# Patient Record
Sex: FEMALE | Race: WHITE | Employment: OTHER | URBAN - METROPOLITAN AREA
[De-identification: names, ages, dates, MRNs, and addresses within clinical notes are randomized per-mention and may not be internally consistent; named-entity substitution may affect disease eponyms.]

---

## 2017-01-04 ENCOUNTER — PATIENT MESSAGE (OUTPATIENT)
Dept: ENDOCRINOLOGY | Age: 77
End: 2017-01-04

## 2017-01-12 ENCOUNTER — PATIENT MESSAGE (OUTPATIENT)
Dept: ENDOCRINOLOGY | Age: 77
End: 2017-01-12

## 2017-01-12 RX ORDER — ATORVASTATIN CALCIUM 80 MG/1
80 TABLET, FILM COATED ORAL
Qty: 90 TAB | Refills: 3 | Status: SHIPPED | OUTPATIENT
Start: 2017-01-12 | End: 2017-01-16 | Stop reason: SDUPTHER

## 2017-01-12 RX ORDER — FLUDROCORTISONE ACETATE 0.1 MG/1
0.1 TABLET ORAL DAILY
Qty: 90 TAB | Refills: 3 | Status: SHIPPED | OUTPATIENT
Start: 2017-01-12 | End: 2017-01-12 | Stop reason: SDUPTHER

## 2017-01-12 RX ORDER — GABAPENTIN 300 MG/1
300 CAPSULE ORAL
Qty: 90 CAP | Refills: 3 | Status: SHIPPED | OUTPATIENT
Start: 2017-01-12 | End: 2017-01-16 | Stop reason: SDUPTHER

## 2017-01-12 RX ORDER — FLUDROCORTISONE ACETATE 0.1 MG/1
0.1 TABLET ORAL DAILY
Qty: 90 TAB | Refills: 3 | Status: SHIPPED | OUTPATIENT
Start: 2017-01-12 | End: 2018-02-15 | Stop reason: SDUPTHER

## 2017-01-12 NOTE — TELEPHONE ENCOUNTER
From: Fernando Farris  To: Fabrizio Wong MD  Sent: 1/12/2017 9:05 AM EST  Subject: Prescription Question    I would like to use Dr. Mark Urena as my primary physician as well. I feel that my endocrine issues are such a large part of my health care needs that it would benefit me to receive my primary care from a doctor who understands my special circumstances. Is this possible? If so, I need refills called in for several of my medications: Atorvastatin, Fluoxetine, gabapentin, Meloxicam, Pantoprazole.   Pharmacy: [address] [place] [address] [phone]

## 2017-01-12 NOTE — TELEPHONE ENCOUNTER
From: Trudy Masterson MD  To: Valentín Frost  Sent: 1/4/2017 9:15 AM EST  Subject: fludrocortisone    Looks like you didn't receive my previous message, I have copied/pasted the message for you to view below:    Hi Mrs Mason Craig,   I sent your results though [address] Can you check and make sure you are not taking fludrocortisone? If you are, please let me know what dose. According to Dr Jennifer Don, you were taking this medication at 0.1mg daily in the past. Just wanted to clarify so I can make appropriate medication recommendations.    Thank you,   Ayush Staff

## 2017-01-16 ENCOUNTER — OFFICE VISIT (OUTPATIENT)
Dept: INTERNAL MEDICINE CLINIC | Age: 77
End: 2017-01-16

## 2017-01-16 VITALS
RESPIRATION RATE: 16 BRPM | TEMPERATURE: 97.7 F | BODY MASS INDEX: 34.36 KG/M2 | HEART RATE: 66 BPM | HEIGHT: 63 IN | DIASTOLIC BLOOD PRESSURE: 80 MMHG | SYSTOLIC BLOOD PRESSURE: 110 MMHG | OXYGEN SATURATION: 97 % | WEIGHT: 193.9 LBS

## 2017-01-16 DIAGNOSIS — K21.9 GASTROESOPHAGEAL REFLUX DISEASE, ESOPHAGITIS PRESENCE NOT SPECIFIED: Chronic | ICD-10-CM

## 2017-01-16 DIAGNOSIS — E11.22 CONTROLLED TYPE 2 DIABETES MELLITUS WITH STAGE 3 CHRONIC KIDNEY DISEASE, WITH LONG-TERM CURRENT USE OF INSULIN (HCC): Primary | ICD-10-CM

## 2017-01-16 DIAGNOSIS — I63.532 CEREBRAL INFARCTION DUE TO STENOSIS OF LEFT POSTERIOR CEREBRAL ARTERY (HCC): ICD-10-CM

## 2017-01-16 DIAGNOSIS — E24.0 CUSHING DISEASE (HCC): Chronic | ICD-10-CM

## 2017-01-16 DIAGNOSIS — G25.81 RLS (RESTLESS LEGS SYNDROME): Chronic | ICD-10-CM

## 2017-01-16 DIAGNOSIS — E03.4 HYPOTHYROIDISM DUE TO ACQUIRED ATROPHY OF THYROID: Chronic | ICD-10-CM

## 2017-01-16 DIAGNOSIS — Z23 ENCOUNTER FOR IMMUNIZATION: ICD-10-CM

## 2017-01-16 DIAGNOSIS — E78.00 ELEVATED CHOLESTEROL: Chronic | ICD-10-CM

## 2017-01-16 DIAGNOSIS — F32.A DEPRESSION, UNSPECIFIED DEPRESSION TYPE: Chronic | ICD-10-CM

## 2017-01-16 DIAGNOSIS — N18.30 CONTROLLED TYPE 2 DIABETES MELLITUS WITH STAGE 3 CHRONIC KIDNEY DISEASE, WITH LONG-TERM CURRENT USE OF INSULIN (HCC): Primary | ICD-10-CM

## 2017-01-16 DIAGNOSIS — I10 ESSENTIAL HYPERTENSION, BENIGN: Chronic | ICD-10-CM

## 2017-01-16 DIAGNOSIS — Z79.4 CONTROLLED TYPE 2 DIABETES MELLITUS WITH STAGE 3 CHRONIC KIDNEY DISEASE, WITH LONG-TERM CURRENT USE OF INSULIN (HCC): Primary | ICD-10-CM

## 2017-01-16 DIAGNOSIS — E27.40 ADRENAL INSUFFICIENCY (HCC): Chronic | ICD-10-CM

## 2017-01-16 RX ORDER — ASCORBIC ACID 500 MG
500 TABLET ORAL DAILY
Qty: 100 TAB | Refills: 3 | Status: SHIPPED | OUTPATIENT
Start: 2017-01-16 | End: 2017-12-04

## 2017-01-16 RX ORDER — ATORVASTATIN CALCIUM 80 MG/1
80 TABLET, FILM COATED ORAL
Qty: 90 TAB | Refills: 3 | Status: SHIPPED | OUTPATIENT
Start: 2017-01-16 | End: 2018-02-09 | Stop reason: SDUPTHER

## 2017-01-16 RX ORDER — GUAIFENESIN 100 MG/5ML
81 LIQUID (ML) ORAL DAILY
Qty: 100 TAB | Refills: 3 | Status: SHIPPED | OUTPATIENT
Start: 2017-01-16 | End: 2018-04-23 | Stop reason: SDUPTHER

## 2017-01-16 RX ORDER — GABAPENTIN 300 MG/1
300 CAPSULE ORAL
Qty: 90 CAP | Refills: 3 | Status: SHIPPED | OUTPATIENT
Start: 2017-01-16 | End: 2018-01-26 | Stop reason: SDUPTHER

## 2017-01-16 RX ORDER — FLUOXETINE 20 MG/1
20 TABLET ORAL
Qty: 90 TAB | Refills: 3 | Status: SHIPPED | OUTPATIENT
Start: 2017-01-16 | End: 2018-01-26 | Stop reason: SDUPTHER

## 2017-01-16 RX ORDER — INSULIN ASPART 100 [IU]/ML
INJECTION, SOLUTION INTRAVENOUS; SUBCUTANEOUS
Qty: 3 PEN | Refills: 10 | Status: SHIPPED | OUTPATIENT
Start: 2017-01-16 | End: 2017-03-08 | Stop reason: SDUPTHER

## 2017-01-16 NOTE — PATIENT INSTRUCTIONS
Follow diabetic and adrenal replacement instructions as per Dr Prem Montoya. Have a Prevnar vaccine at your pharmacy. Continue your current medications. Get regular eye exams. Check your feet morning and night. Diabetes Foot Health: Care Instructions  Your Care Instructions    When you have diabetes, your feet need extra care and attention. Diabetes can damage the nerve endings and blood vessels in your feet, making you less likely to notice when your feet are injured. Diabetes also limits your body's ability to fight infection and get blood to areas that need it. If you get a minor foot injury, it could become an ulcer or a serious infection. With good foot care, you can prevent most of these problems. Caring for your feet can be quick and easy. Most of the care can be done when you are bathing or getting ready for bed. Follow-up care is a key part of your treatment and safety. Be sure to make and go to all appointments, and call your doctor if you are having problems. Its also a good idea to know your test results and keep a list of the medicines you take. How can you care for yourself at home? · Keep your blood sugar close to normal by watching what and how much you eat, monitoring blood sugar, taking medicines if prescribed, and getting regular exercise. · Do not smoke. Smoking affects blood flow and can make foot problems worse. If you need help quitting, talk to your doctor about stop-smoking programs and medicines. These can increase your chances of quitting for good. · Eat a diet that is low in fats. High fat intake can cause fat to build up in your blood vessels and decrease blood flow. · Inspect your feet daily for blisters, cuts, cracks, or sores. If you cannot see well, use a mirror or have someone help you. · Take care of your feet:  [facility] your feet every day. Use warm (not hot) water. Check the water temperature with your wrists or other part of your body, not your feet.   ¨ Dry your feet well. Pat them dry. Do not rub the skin on your feet too hard. Dry well between your toes. If the skin on your feet stays moist, bacteria or a fungus can grow, which can lead to infection. ¨ Keep your skin soft. Use moisturizing skin cream to keep the skin on your feet soft and prevent calluses and cracks. But do not put the cream between your toes, and stop using any cream that causes a rash. ¨ Clean underneath your toenails carefully. Do not use a sharp object to clean underneath your toenails. Use the blunt end of a nail file or other rounded tool. ¨ Trim and file your toenails straight across to prevent ingrown toenails. Use a nail clipper, not scissors. Use an emery board to smooth the edges. · Change socks daily. Socks without seams are best, because seams often rub the feet. You can find socks for people with diabetes from specialty catalogs. · Look inside your shoes every day for things like gravel or torn linings, which could cause blisters or sores. · Buy shoes that fit well:  ¨ Look for shoes that have plenty of space around the toes. This helps prevent bunions and blisters. ¨ Try on shoes while wearing the kind of socks you will usually wear with the shoes. ¨ Avoid plastic shoes. They may rub your feet and cause blisters. Good shoes should be made of materials that are flexible and breathable, such as leather or cloth. ¨ Break in new shoes slowly by wearing them for no more than an hour a day for several days. Take extra time to check your feet for red areas, blisters, or other problems after you wear new shoes. · Do not go barefoot. Do not wear sandals, and do not wear shoes with very thin soles. Thin soles are easy to puncture. They also do not protect your feet from hot pavement or cold weather. · Have your doctor check your feet during each visit. If you have a foot problem, see your doctor. Do not try to treat an early foot problem at home.  Home remedies or treatments that you can buy without a prescription (such as corn removers) can be harmful. · Always get early treatment for foot problems. A minor irritation can lead to a major problem if not properly cared for early. When should you call for help? Call your doctor now or seek immediate medical care if:  · You have a foot sore, an ulcer or break in the skin that is not healing after 4 days, bleeding corns or calluses, or an ingrown toenail. · You have blue or black areas, which can mean bruising or blood flow problems. · You have peeling skin or tiny blisters between your toes or cracking or oozing of the skin. · You have a fever for more than 24 hours and a foot sore. · You have new numbness or tingling in your feet that does not go away after you move your feet or change positions. · You have unexplained or unusual swelling of the foot or ankle. Watch closely for changes in your health, and be sure to contact your doctor if:  · You cannot do proper foot care. Where can you learn more? Go to http://love-beth.info/. Enter A739 in the search box to learn more about \"Diabetes Foot Health: Care Instructions. \"  Current as of: May 23, 2016  Content Version: 11.1  © 9890-1308 [employer]. Care instructions adapted under license by [employer] (which disclaims liability or warranty for this information). If you have questions about a medical condition or this instruction, always ask your healthcare professional. [place] [address] any warranty or liability for your use of this information.

## 2017-01-16 NOTE — MR AVS SNAPSHOT
Visit Information Date & Time Provider Department Dept. Phone Encounter #  
 1/16/2017 11:00 AM Marlen Mcintosh MD [place] [address] [phone] Follow-up Instructions Return in about 3 months (around 4/16/2017) for F/U DM, ETc. Your Appointments 2/7/2017 10:50 AM  
ROUTINE CARE with MD Donovan Khan [facility] Appt Note: f/u diabetes cp0.00; f/u diabetes cp0.00  
 [address] [place] [address]  
[id number] [address] [address] Upcoming Health Maintenance Date Due  
 EYE EXAM RETINAL OR DILATED Q1 6/29/1950 ZOSTER VACCINE AGE 60> 6/29/2000 GLAUCOMA SCREENING Q2Y 6/29/2005 MEDICARE YEARLY EXAM 6/29/2005 Pneumococcal 65+ High/Highest Risk (2 of 2 - PPSV23) 10/15/2014 MICROALBUMIN Q1 2/11/2017 HEMOGLOBIN A1C Q6M 6/19/2017 LIPID PANEL Q1 10/13/2017 FOOT EXAM Q1 12/19/2017 DTaP/Tdap/Td series (2 - Td) 3/30/2020 Allergies as of 1/16/2017  Review Complete On: 1/16/2017 By: Marlen Mcintosh MD  
  
 Severity Noted Reaction Type Reactions Amoxicillin  10/13/2016    Unknown (comments) Erythromycin  04/22/2011    Rash, Unknown (comments) fever Current Immunizations  Reviewed on 4/2/2016 Name Date DTaP 3/30/2010 Influenza Vaccine 10/30/2015 Influenza Vaccine Whole 10/15/2010 Pneumococcal Vaccine (Unspecified Type) 10/15/2009 Not reviewed this visit You Were Diagnosed With   
  
 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 
 ICD-9-CM: 434.91 Elevated cholesterol     ICD-10-CM: E78.00 ICD-9-CM: 272.0 Depression, unspecified depression type     ICD-10-CM: F32.9 ICD-9-CM: 181 Encounter for immunization     ICD-10-CM: T58 ICD-9-CM: V03.89 Vitals BP Pulse Temp Resp Height(growth percentile) Weight(growth percentile) 110/80 (BP 1 Location: Left arm, BP Patient Position: Sitting) 66 97.7 °F (36.5 °C) (Oral) 16 5' 3\" (1.6 m) 193 lb 14.4 oz (88 kg) SpO2 BMI OB Status Smoking Status 97% 34.35 kg/m2 Postmenopausal Never Smoker Vitals History BMI and BSA Data Body Mass Index Body Surface Area  
 34.35 kg/m 2 1.98 m 2 Preferred Pharmacy Pharmacy Name Phone [facility] [phone] Your Updated Medication List  
  
   
This list is accurate as of: 1/16/17 11:47 AM.  Always use your most recent med list. amLODIPine 5 mg tablet Commonly known as:  Lico Oliveros Take 1 Tab by mouth daily. Indications: HYPERTENSION  
  
 ascorbic acid (vitamin C) 500 mg tablet Commonly known as:  VITAMIN C Take 1 Tab by mouth daily. aspirin 81 mg chewable tablet Take 1 Tab by mouth daily. atorvastatin 80 mg tablet Commonly known as:  LIPITOR Take 1 Tab by mouth nightly. colestipol 1 gram tablet Commonly known as:  COLESTID Take 1 g by mouth two (2) times a day. fludrocortisone 0.1 mg tablet Commonly known as:  FLORINEF Take 1 Tab by mouth daily. FLUoxetine 20 mg tablet Commonly known as:  PROzac Take 1 Tab by mouth nightly. furosemide 40 mg tablet Commonly known as:  LASIX Take 40 mg by mouth daily. gabapentin 300 mg capsule Commonly known as:  NEURONTIN Take 1 Cap by mouth nightly. hydrocortisone 5 mg tablet Commonly known as:  CORTEF  
15mg Qam, 5mg Qpm  
  
 insulin aspart 100 unit/mL Inpn Commonly known as:  Veronica Mathur Take 16 units with every meal or as directed. insulin glargine 300 unit/mL (1.5 mL) Inpn Commonly known as:  TOUJEO SOLOSTAR  
66 Units by SubCUTAneous route nightly. 60 units L. acidoph & paracasei- S therm- Bifido 8 billion cell Cap cap Commonly known as:  ASUNCION-Q Take 1 Cap by mouth daily. levothyroxine 100 mcg tablet Commonly known as:  SYNTHROID Take 1 Tab by mouth Daily (before breakfast). lisinopril 40 mg tablet Commonly known as:  Ronalee Ruffing Take 40 mg by mouth every morning. meloxicam 15 mg tablet Commonly known as:  MOBIC Take 15 mg by mouth daily. pantoprazole 40 mg tablet Commonly known as:  PROTONIX Take 1 Tab by mouth Daily (before breakfast). Indications: GASTROESOPHAGEAL REFLUX pneumococcal 13 aashish conj dip 0.5 mL Syrg injection Commonly known as:  PREVNAR-13  
0.5 mL by IntraMUSCular route once for 1 dose. SITagliptin 50 mg tablet Commonly known as:  Jose Dgato Rucker Take 1 Tab by mouth daily. Prescriptions Printed Refills  
 pneumococcal 13 aashish conj dip (PREVNAR-13) 0.5 mL syrg injection 0 Si.5 mL by IntraMUSCular route once for 1 dose. Class: Print Route: IntraMUSCular Prescriptions Sent to Pharmacy Refills  
 ascorbic acid, vitamin C, (VITAMIN C) 500 mg tablet 3 Sig: Take 1 Tab by mouth daily. Class: Normal  
 Pharmacy: [address] [place], [place] [address]and Dr. Ph #: [phone] Route: Oral  
 aspirin 81 mg chewable tablet 3 Sig: Take 1 Tab by mouth daily. Class: Normal  
 Pharmacy: [address] [place], [place] [address]and Dr. Ph #: [phone] Route: Oral  
 atorvastatin (LIPITOR) 80 mg tablet 3 Sig: Take 1 Tab by mouth nightly. Class: Normal  
 Pharmacy: [address] [place], [place] [address]and Dr. Ph #: [phone]  Route: Oral  
 FLUoxetine (PROZAC) 20 mg tablet 3  
 Sig: Take 1 Tab by mouth nightly. Class: Normal  
 Pharmacy: [address]  Ph #: [phone] Route: Oral  
 gabapentin (NEURONTIN) 300 mg capsule 3 Sig: Take 1 Cap by mouth nightly. Class: Normal  
 Pharmacy: [address]  Ph #: [phone] Route: Oral  
 insulin aspart (NOVOLOG) 100 unit/mL inpn 10 Sig: Take 16 units with every meal or as directed. Class: Normal  
 Pharmacy: [address]  Ph #: [phone] SITagliptin (JANUVIA) 50 mg tablet 11 Sig: Take 1 Tab by mouth daily. Class: Normal  
 Pharmacy: [address]  Ph #: [phone] Route: Oral  
 insulin glargine (TOUJEO SOLOSTAR) 300 unit/mL (1.5 mL) inpn 11 Si Units by SubCUTAneous route nightly. 60 units Class: Normal  
 Pharmacy: [address]  Ph #: [phone] Route: SubCUTAneous Follow-up Instructions Return in about 3 months (around 2017) for F/U DM, ETc. Patient Instructions Follow diabetic and adrenal replacement instructions as per Dr Ebony Burkitt. Have a Prevnar vaccine at your pharmacy. Continue your current medications. Get regular eye exams. Check your feet morning and night. Diabetes Foot Health: Care Instructions Your Care Instructions When you have diabetes, your feet need extra care and attention. Diabetes can damage the nerve endings and blood vessels in your feet, making you less likely to notice when your feet are injured. Diabetes also limits your body's ability to fight infection and get blood to areas that need it. If you get a minor foot injury, it could become an ulcer or a serious infection. With good foot care, you can prevent most of these problems. Caring for your feet can be quick and easy. Most of the care can be done when you are bathing or getting ready for bed. Follow-up care is a key part of your treatment and safety. Be sure to make and go to all appointments, and call your doctor if you are having problems. Its also a good idea to know your test results and keep a list of the medicines you take. How can you care for yourself at home? · Keep your blood sugar close to normal by watching what and how much you eat, monitoring blood sugar, taking medicines if prescribed, and getting regular exercise. · Do not smoke. Smoking affects blood flow and can make foot problems worse. If you need help quitting, talk to your doctor about stop-smoking programs and medicines. These can increase your chances of quitting for good. · Eat a diet that is low in fats. High fat intake can cause fat to build up in your blood vessels and decrease blood flow. · Inspect your feet daily for blisters, cuts, cracks, or sores. If you cannot see well, use a mirror or have someone help you. · Take care of your feet: 
[facility] your feet every day. Use warm (not hot) water. Check the water temperature with your wrists or other part of your body, not your feet. ¨ Dry your feet well. Pat them dry. Do not rub the skin on your feet too hard. Dry well between your toes. If the skin on your feet stays moist, bacteria or a fungus can grow, which can lead to infection. ¨ Keep your skin soft. Use moisturizing skin cream to keep the skin on your feet soft and prevent calluses and cracks. But do not put the cream between your toes, and stop using any cream that causes a rash. ¨ Clean underneath your toenails carefully. Do not use a sharp object to clean underneath your toenails. Use the blunt end of a nail file or other rounded tool. ¨ Trim and file your toenails straight across to prevent ingrown toenails. Use a nail clipper, not scissors. Use an emery board to smooth the edges. · Change socks daily. Socks without seams are best, because seams often rub the feet. You can find socks for people with diabetes from specialty catalogs. · Look inside your shoes every day for things like gravel or torn linings, which could cause blisters or sores. · Buy shoes that fit well: 
¨ Look for shoes that have plenty of space around the toes. This helps prevent bunions and blisters. ¨ Try on shoes while wearing the kind of socks you will usually wear with the shoes. ¨ Avoid plastic shoes. They may rub your feet and cause blisters. Good shoes should be made of materials that are flexible and breathable, such as leather or cloth. ¨ Break in new shoes slowly by wearing them for no more than an hour a day for several days. Take extra time to check your feet for red areas, blisters, or other problems after you wear new shoes. · Do not go barefoot. Do not wear sandals, and do not wear shoes with very thin soles. Thin soles are easy to puncture. They also do not protect your feet from hot pavement or cold weather. · Have your doctor check your feet during each visit. If you have a foot problem, see your doctor. Do not try to treat an early foot problem at home. Home remedies or treatments that you can buy without a prescription (such as corn removers) can be harmful. · Always get early treatment for foot problems. A minor irritation can lead to a major problem if not properly cared for early. When should you call for help? Call your doctor now or seek immediate medical care if: 
· You have a foot sore, an ulcer or break in the skin that is not healing after 4 days, bleeding corns or calluses, or an ingrown toenail. · You have blue or black areas, which can mean bruising or blood flow problems. · You have peeling skin or tiny blisters between your toes or cracking or oozing of the skin. · You have a fever for more than 24 hours and a foot sore. · You have new numbness or tingling in your feet that does not go away after you move your feet or change positions. · You have unexplained or unusual swelling of the foot or ankle. Watch closely for changes in your health, and be sure to contact your doctor if: 
· You cannot do proper foot care. Where can you learn more? Go to http://love-beth.info/. Enter A739 in the search box to learn more about \"Diabetes Foot Health: Care Instructions. \" Current as of: May 23, 2016 Content Version: 11.1 © 7081-1721 [employer]. Care instructions adapted under license by [email or web address] (which disclaims liability or warranty for this information). If you have questions about a medical condition or this instruction, always ask your healthcare professional. [address] any warranty or liability for your use of this information. Introducing [facility]! Dear Francesco Campos: Thank you for requesting a [employer] account. Our records indicate that you already have an active [employer] account. You can access your account anytime at [email or web address] Did you know that you can access your hospital and ER discharge instructions at any time in [employer])? You can also review all of your test results from your hospital stay or ER visit. Additional Information If you have questions, please visit the Frequently Asked Questions section of the [employer]) website at [email or web address] Remember, [employer]) is NOT to be used for urgent needs. For medical emergencies, dial 911. Now available from your iPhone and Android! Please provide this summary of care documentation to your next provider. Your primary care clinician is listed as Alison Cowart. If you have any questions after today's visit, please call [phone].

## 2017-01-16 NOTE — PROGRESS NOTES
Chief Complaint   Patient presents with    New Patient     Reviewed record in preparation for visit and have obtained necessary documentation. Identified pt with two pt identifiers(name and ). Health Maintenance Due   Topic    EYE EXAM RETINAL OR DILATED Q1     ZOSTER VACCINE AGE 60>     GLAUCOMA SCREENING Q2Y     MEDICARE YEARLY EXAM     Pneumococcal 65+ High/Highest Risk (2 of 2 - PPSV23)    MICROALBUMIN Q1          Chief Complaint   Patient presents with    New Patient        Wt Readings from Last 3 Encounters:   17 193 lb 14.4 oz (88 kg)   16 196 lb 6.4 oz (89.1 kg)   10/30/16 188 lb (85.3 kg)     Temp Readings from Last 3 Encounters:   17 97.7 °F (36.5 °C) (Oral)   10/30/16 98.2 °F (36.8 °C)   10/15/16 98.2 °F (36.8 °C)     BP Readings from Last 3 Encounters:   17 110/80   16 163/65   10/30/16 188/82     Pulse Readings from Last 3 Encounters:   17 66   16 63   10/30/16 72           Learning Assessment:  :     No flowsheet data found. Depression Screening:  :     No flowsheet data found. Fall Risk Assessment:  :     Fall Risk Assessment, last 12 mths 2014   Able to walk? Yes   Fall in past 12 months? No       Abuse Screening:  :     No flowsheet data found. Coordination of Care Questionnaire:  :     1) Have you been to an emergency room, urgent care clinic since your last visit? no   Hospitalized since your last visit? no             2) Have you seen or consulted any other health care providers outside of [address] [place] [address] since your last visit? no  (Include any pap smears or colon screenings in this section.)    3) Do you have an Advance Directive on file? no    4) Are you interested in receiving information on Advance Directives? NO      Patient is accompanied by self I have received verbal consent from Edson Luu to discuss any/all medical information while they are present in the room.     Reviewed record  In preparation for visit and have obtained necessary documentation.

## 2017-01-19 DIAGNOSIS — R11.0 NAUSEA: Primary | ICD-10-CM

## 2017-01-19 RX ORDER — ONDANSETRON 4 MG/1
4 TABLET, ORALLY DISINTEGRATING ORAL
Qty: 20 TAB | Refills: 0 | Status: SHIPPED | OUTPATIENT
Start: 2017-01-19 | End: 2017-02-03

## 2017-02-01 DIAGNOSIS — E27.40 ADRENAL INSUFFICIENCY (HCC): Chronic | ICD-10-CM

## 2017-02-01 DIAGNOSIS — E03.9 ACQUIRED HYPOTHYROIDISM: Chronic | ICD-10-CM

## 2017-02-03 ENCOUNTER — OFFICE VISIT (OUTPATIENT)
Dept: INTERNAL MEDICINE CLINIC | Age: 77
End: 2017-02-03

## 2017-02-03 ENCOUNTER — APPOINTMENT (OUTPATIENT)
Dept: GENERAL RADIOLOGY | Age: 77
End: 2017-02-03
Attending: EMERGENCY MEDICINE
Payer: MEDICARE

## 2017-02-03 ENCOUNTER — HOSPITAL ENCOUNTER (EMERGENCY)
Age: 77
Discharge: HOME | End: 2017-02-03
Attending: EMERGENCY MEDICINE
Payer: MEDICARE

## 2017-02-03 VITALS
DIASTOLIC BLOOD PRESSURE: 98 MMHG | RESPIRATION RATE: 12 BRPM | TEMPERATURE: 98.1 F | SYSTOLIC BLOOD PRESSURE: 116 MMHG | WEIGHT: 190 LBS | HEART RATE: 60 BPM | OXYGEN SATURATION: 97 % | HEIGHT: 62 IN | BODY MASS INDEX: 34.96 KG/M2

## 2017-02-03 VITALS
WEIGHT: 196.7 LBS | BODY MASS INDEX: 34.85 KG/M2 | HEART RATE: 81 BPM | DIASTOLIC BLOOD PRESSURE: 66 MMHG | TEMPERATURE: 98.1 F | SYSTOLIC BLOOD PRESSURE: 148 MMHG | HEIGHT: 63 IN | OXYGEN SATURATION: 98 % | RESPIRATION RATE: 16 BRPM

## 2017-02-03 DIAGNOSIS — R06.00 DYSPNEA, UNSPECIFIED TYPE: ICD-10-CM

## 2017-02-03 DIAGNOSIS — R07.9 ACUTE CHEST PAIN: Primary | ICD-10-CM

## 2017-02-03 DIAGNOSIS — J22 LOWER RESPIRATORY INFECTION: Primary | ICD-10-CM

## 2017-02-03 DIAGNOSIS — J01.90 ACUTE SINUSITIS, RECURRENCE NOT SPECIFIED, UNSPECIFIED LOCATION: ICD-10-CM

## 2017-02-03 LAB
ALBUMIN SERPL BCP-MCNC: 3.6 G/DL (ref 3.5–5)
ALBUMIN/GLOB SERPL: 0.9 {RATIO} (ref 1.1–2.2)
ALP SERPL-CCNC: 94 U/L (ref 45–117)
ALT SERPL-CCNC: 18 U/L (ref 12–78)
ANION GAP BLD CALC-SCNC: 7 MMOL/L (ref 5–15)
APPEARANCE UR: CLEAR
AST SERPL W P-5'-P-CCNC: 18 U/L (ref 15–37)
BACTERIA URNS QL MICRO: NEGATIVE /HPF
BASOPHILS # BLD AUTO: 0.1 K/UL (ref 0–0.1)
BASOPHILS # BLD: 1 % (ref 0–1)
BILIRUB SERPL-MCNC: 0.3 MG/DL (ref 0.2–1)
BILIRUB UR QL: NEGATIVE
BUN SERPL-MCNC: 23 MG/DL (ref 6–20)
BUN/CREAT SERPL: 22 (ref 12–20)
CALCIUM SERPL-MCNC: 9.1 MG/DL (ref 8.5–10.1)
CHLORIDE SERPL-SCNC: 101 MMOL/L (ref 97–108)
CO2 SERPL-SCNC: 26 MMOL/L (ref 21–32)
COLOR UR: NORMAL
CREAT SERPL-MCNC: 1.04 MG/DL (ref 0.55–1.02)
EOSINOPHIL # BLD: 0.4 K/UL (ref 0–0.4)
EOSINOPHIL NFR BLD: 2 % (ref 0–7)
EPITH CASTS URNS QL MICRO: NORMAL /LPF
ERYTHROCYTE [DISTWIDTH] IN BLOOD BY AUTOMATED COUNT: 14.2 % (ref 11.5–14.5)
GLOBULIN SER CALC-MCNC: 3.9 G/DL (ref 2–4)
GLUCOSE SERPL-MCNC: 64 MG/DL (ref 65–100)
GLUCOSE UR STRIP.AUTO-MCNC: NEGATIVE MG/DL
HCT VFR BLD AUTO: 41.9 % (ref 35–47)
HGB BLD-MCNC: 13.3 G/DL (ref 11.5–16)
HGB UR QL STRIP: NEGATIVE
KETONES UR QL STRIP.AUTO: NEGATIVE MG/DL
LEUKOCYTE ESTERASE UR QL STRIP.AUTO: NEGATIVE
LYMPHOCYTES # BLD AUTO: 18 % (ref 12–49)
LYMPHOCYTES # BLD: 3.2 K/UL (ref 0.8–3.5)
MCH RBC QN AUTO: 27.1 PG (ref 26–34)
MCHC RBC AUTO-ENTMCNC: 31.7 G/DL (ref 30–36.5)
MCV RBC AUTO: 85.3 FL (ref 80–99)
MONOCYTES # BLD: 1.3 K/UL (ref 0–1)
MONOCYTES NFR BLD AUTO: 8 % (ref 5–13)
NEUTS SEG # BLD: 12.4 K/UL (ref 1.8–8)
NEUTS SEG NFR BLD AUTO: 71 % (ref 32–75)
NITRITE UR QL STRIP.AUTO: NEGATIVE
PH UR STRIP: 7.5 [PH] (ref 5–8)
PLATELET # BLD AUTO: 329 K/UL (ref 150–400)
POTASSIUM SERPL-SCNC: 3.7 MMOL/L (ref 3.5–5.1)
PROT SERPL-MCNC: 7.5 G/DL (ref 6.4–8.2)
PROT UR STRIP-MCNC: NEGATIVE MG/DL
RBC # BLD AUTO: 4.91 M/UL (ref 3.8–5.2)
RBC #/AREA URNS HPF: NORMAL /HPF (ref 0–5)
SODIUM SERPL-SCNC: 134 MMOL/L (ref 136–145)
SP GR UR REFRACTOMETRY: <1.005 (ref 1–1.03)
TROPONIN I SERPL-MCNC: <0.04 NG/ML
TROPONIN I SERPL-MCNC: <0.04 NG/ML
UA: UC IF INDICATED,UAUC: NORMAL
UROBILINOGEN UR QL STRIP.AUTO: 0.2 EU/DL (ref 0.2–1)
WBC # BLD AUTO: 17.3 K/UL (ref 3.6–11)
WBC URNS QL MICRO: NORMAL /HPF (ref 0–4)

## 2017-02-03 PROCEDURE — 84484 ASSAY OF TROPONIN QUANT: CPT | Performed by: EMERGENCY MEDICINE

## 2017-02-03 PROCEDURE — 71020 XR CHEST PA LAT: CPT

## 2017-02-03 PROCEDURE — 93005 ELECTROCARDIOGRAM TRACING: CPT

## 2017-02-03 PROCEDURE — 99285 EMERGENCY DEPT VISIT HI MDM: CPT

## 2017-02-03 PROCEDURE — 81001 URINALYSIS AUTO W/SCOPE: CPT | Performed by: EMERGENCY MEDICINE

## 2017-02-03 PROCEDURE — 80053 COMPREHEN METABOLIC PANEL: CPT | Performed by: EMERGENCY MEDICINE

## 2017-02-03 PROCEDURE — 36415 COLL VENOUS BLD VENIPUNCTURE: CPT | Performed by: EMERGENCY MEDICINE

## 2017-02-03 PROCEDURE — 85025 COMPLETE CBC W/AUTO DIFF WBC: CPT | Performed by: EMERGENCY MEDICINE

## 2017-02-03 PROCEDURE — 74011250637 HC RX REV CODE- 250/637: Performed by: EMERGENCY MEDICINE

## 2017-02-03 RX ORDER — GUAIFENESIN 100 MG/5ML
324 LIQUID (ML) ORAL
Status: COMPLETED | OUTPATIENT
Start: 2017-02-03 | End: 2017-02-03

## 2017-02-03 RX ORDER — LEVOFLOXACIN 500 MG/1
500 TABLET, FILM COATED ORAL DAILY
Qty: 5 TAB | Refills: 0 | Status: SHIPPED | OUTPATIENT
Start: 2017-02-03 | End: 2017-02-03

## 2017-02-03 RX ORDER — DOXYCYCLINE 100 MG/1
100 CAPSULE ORAL 2 TIMES DAILY
Qty: 20 CAP | Refills: 0 | Status: SHIPPED | OUTPATIENT
Start: 2017-02-03 | End: 2017-02-07

## 2017-02-03 RX ORDER — NITROGLYCERIN 0.4 MG/1
0.4 TABLET SUBLINGUAL
Status: DISCONTINUED | OUTPATIENT
Start: 2017-02-03 | End: 2017-02-03 | Stop reason: HOSPADM

## 2017-02-03 RX ADMIN — ASPIRIN 81 MG CHEWABLE TABLET 324 MG: 81 TABLET CHEWABLE at 14:34

## 2017-02-03 NOTE — ED PROVIDER NOTES
HPI Comments: 68 y.o. female with past medical history significant for diabetes, endocrine disease, hypothyroid, GERD, osteoporosis, stroke, UTI, Elizabeth's disease, and Cushing disease who presents from [facility] via EMS with chief complaint of chest pain. Pt states that she started developing chest pain and shortness of breath after taking Levaquin, which was prescribed to her by her PCP this morning for a sinus infection. She reports taking it around 1030 this morning (4 hours ago) and states that she woke up feeling short of breath. Pt also complains of nausea and states that she \"couldn't eat food\". Pt also complains of having heart palpitations since taking the Levaquin. She states that her chest pain is currently mild. There are no other acute medical concerns at this time. Social hx: nonsmoker, no EtOH use, no drug use  PCP: Nenita Gaines MD    Note written by Halie Obrien, as dictated by Karel Magdaleno MD 2:28 PM      The history is provided by the patient. No  was used.         Past Medical History:   Diagnosis Date    Arthritis osteoporosis    Cataract     Diabetes ([address]     Endocrine disease      adrenal insufficiency    GERD (gastroesophageal reflux disease)     H/O Cushing disease     Hypertension     Other ill-defined conditions(799.89)      elizabeth's disease    [address] [address]      TIA 2011    Thyroid disease hypothyroid    UTI (lower urinary tract infection)        Past Surgical History:   Procedure Laterality Date    Hx hysterectomy      Hx tonsillectomy      Hx cataract removal       ON the left    Hx other surgical       adrenal gland removed 1970    Hx appendectomy      Hx cholecystectomy      Hx cholecystectomy  2000    Pr endocrine surgery proc unlisted       adrenal surgery in 1972         Family History:   Problem Relation Age of Onset    Cancer Mother     Diabetes Mother     Stroke Mother     Psychiatric Disorder Mother     Dementia Mother     Headache Mother     Heart Disease Father     Psychiatric Disorder Father     Stroke Father     Asthma Sister     Diabetes Sister     Asthma Brother     Heart Disease Brother        Social History     Social History    Marital status:      Spouse name: N/A    Number of children: N/A    Years of education: N/A     Occupational History    Not on file. Social History Main Topics    Smoking status: Never Smoker    Smokeless tobacco: Never Used    Alcohol use No    Drug use: No    Sexual activity: Not Currently     Other Topics Concern    Not on file     Social History Narrative         ALLERGIES: Amoxicillin and Erythromycin    Review of Systems   Respiratory: Positive for shortness of breath. Cardiovascular: Positive for chest pain and palpitations. Gastrointestinal: Positive for nausea. All other systems reviewed and are negative. Vitals:    02/03/17 1350   BP: 163/58   Pulse: 60   Resp: 16   Temp: 98.1 °F (36.7 °C)   SpO2: 100%   Weight: 86.2 kg (190 lb)   Height: 5' 2\" (1.575 m)            Physical Exam     Nursing note and vitals reviewed. Constitutional: appears well-developed and well-nourished. No distress. HENT:   Head: Normocephalic and atraumatic. Sclera anicteric  Nose: No rhinorrhea  Mouth/Throat: Oropharynx is clear and moist. Pharynx normal  Eyes: Conjunctivae are normal. Pupils are equal, round, and reactive to light. Right eye exhibits no discharge. Left eye exhibits no discharge. No scleral icterus. Neck: Painless normal range of motion. Supple  Cardiovascular: Normal rate, regular rhythm, normal heart sounds and intact distal pulses. Exam reveals no gallop and no friction rub. No murmur heard. Pulmonary/Chest: Effort normal and breath sounds normal. No respiratory distress. no wheezes. no rales. Abdominal: Soft. Bowel sounds are normal. Exhibits no distension and no mass. No tenderness. No guarding.    Musculoskeletal: Normal range of motion. no tenderness. No edema  Lymphadenopathy:   No cervical adenopathy. Neurological:  Alert and oriented to person, place, and time. Coordination normal. CN 2-12 intact. Moving all extremities. Skin: Skin is warm and dry. No rash noted. No pallor. MDM  ED Course   69 yo with h/o DM, stroke, HTN here with cp and sob after starting levaquin for sinusitis. EKG without ischemic changes. DDX:  MI, angina, medication related, GERD and others. Will check cxr and labs. ASA. Procedures  ED EKG interpretation:  Rhythm: normal sinus rhythm; and regular . Rate (approx.): 60; Axis: left axis deviation; ST/T wave: normal.  Note written by Halie Elena, as dictated by Miroslava Perdomo MD 2:07 PM    PROGRESS NOTE:  3:11 PM Pt has previously seen Dr. Kaci Dao MD from [facility]. Cardiology will consult Dr. Blair Dawkins. 4:00 PM  D/w Dr. Blair Dawkins, cardiology, who saw pt. Recommended second set of CE's which have been ordered. Will stop levofloxacin and start doxycycline. Pt allergic to amox and erythro. Pt with some chronic leukocytosis.

## 2017-02-03 NOTE — PROGRESS NOTES
Subjective:     Chief Complaint   Patient presents with    Cold Symptoms     nasal congestion, chest congestion, cough, chest discomfort and nasal discharge x1 week     She  is a 68y.o. year old female who presents for evaluation. For one week has had sinus congestion, ST. Now with cough and left lower chest discomfort. Having chills but no temperature. Facial pressure on left side. Historical Data    Past Medical History   Diagnosis Date    Arthritis osteoporosis    Cataract     Diabetes [address] [address]     Endocrine disease      adrenal insufficiency    GERD (gastroesophageal reflux disease)     H/O Cushing disease     Hypertension     Other ill-defined conditions(799.89)      elizabeth's disease    Stroke ([facility] [address]      TIA 2011    Thyroid disease hypothyroid    UTI (lower urinary tract infection)         Past Surgical History   Procedure Laterality Date    Hx hysterectomy      Hx tonsillectomy      Hx cataract removal       ON the left    Hx other surgical       adrenal gland removed 1970    Hx appendectomy      Hx cholecystectomy      Hx cholecystectomy  2000    Pr endocrine surgery proc unlisted       adrenal surgery in 1972        Outpatient Encounter Prescriptions as of 2/3/2017   Medication Sig Dispense Refill    ondansetron (ZOFRAN ODT) 4 mg disintegrating tablet Take 1 Tab by mouth every eight (8) hours as needed for Nausea. 20 Tab 0    ascorbic acid, vitamin C, (VITAMIN C) 500 mg tablet Take 1 Tab by mouth daily. 100 Tab 3    aspirin 81 mg chewable tablet Take 1 Tab by mouth daily. 100 Tab 3    atorvastatin (LIPITOR) 80 mg tablet Take 1 Tab by mouth nightly. 90 Tab 3    FLUoxetine (PROZAC) 20 mg tablet Take 1 Tab by mouth nightly. 90 Tab 3    gabapentin (NEURONTIN) 300 mg capsule Take 1 Cap by mouth nightly. 90 Cap 3    insulin aspart (NOVOLOG) 100 unit/mL inpn Take 16 units with every meal or as directed. 3 Pen 10    SITagliptin (JANUVIA) 50 mg tablet Take 1 Tab by mouth daily.  27 Tab 11    insulin glargine (TOUJEO SOLOSTAR) 300 unit/mL (1.5 mL) inpn 66 Units by SubCUTAneous route nightly. 60 units 3 Adjustable Dose Pre-filled Pen Syringe 11    fludrocortisone (FLORINEF) 0.1 mg tablet Take 1 Tab by mouth daily. 90 Tab 3    levothyroxine (SYNTHROID) 100 mcg tablet Take 1 Tab by mouth Daily (before breakfast). 30 Tab 11    meloxicam (MOBIC) 15 mg tablet Take 15 mg by mouth daily.  hydrocortisone (CORTEF) 5 mg tablet 15mg Qam, 5mg Qpm 120 Tab 11    pantoprazole (PROTONIX) 40 mg tablet Take 1 Tab by mouth Daily (before breakfast). Indications: GASTROESOPHAGEAL REFLUX 30 Tab 1    L. acidoph & paracasei- S therm- Bifido (ASUNCION-Q) 8 billion cell cap cap Take 1 Cap by mouth daily. 14 Cap 0    amLODIPine (NORVASC) 5 mg tablet Take 1 Tab by mouth daily. Indications: HYPERTENSION 30 Tab 1    furosemide (LASIX) 40 mg tablet Take 40 mg by mouth daily.  lisinopril (PRINIVIL, ZESTRIL) 40 mg tablet Take 40 mg by mouth every morning.  colestipol (COLESTID) 1 gram tablet Take 1 g by mouth two (2) times a day. No facility-administered encounter medications on file as of 2/3/2017. Allergies   Allergen Reactions    Amoxicillin Unknown (comments)    Erythromycin Rash and Unknown (comments)     fever        Social History     Social History    Marital status:      Spouse name: N/A    Number of children: N/A    Years of education: N/A     Occupational History    Not on file. Social History Main Topics    Smoking status: Never Smoker    Smokeless tobacco: Never Used    Alcohol use No    Drug use: No    Sexual activity: Not Currently     Other Topics Concern    Not on file     Social History Narrative        Review of Systems  Pertinent items are noted in HPI.     Objective:     Vitals:    02/03/17 0942   BP: 148/66   Pulse: 81   Resp: 16   Temp: 98.1 °F (36.7 °C)   TempSrc: Oral   SpO2: 98%   Weight: 196 lb 11.2 oz (89.2 kg)   Height: 5' 3\" (1.6 m) Pleasant WF in no acute distress. Ears:  TM's OK. Throat:  Clear. Sinuses:  Slightly tender. Lungs: Faint wheeze in right lower lobe but otherwise clear. ASSESSMENT / PLAN:   1. Lower respiratory infection  · Anticipatory guidance. · Allergic to PCN and Erythromycin so Levaquin chosen. - levoFLOXacin (LEVAQUIN) 500 mg tablet; Take 1 Tab by mouth daily. Indications: ACUTE BACTERIAL MAXILLARY SINUSITIS  Dispense: 5 Tab; Refill: 0    Patient Instructions   Drink plenty of fluids. Take Levaquin once daily for 5 days. Don't take any ondansetron while taking Levaquin. Return next week if symptoms aren't improving. Follow-up Disposition:  Return if symptoms worsen or fail to improve. Advised her to call back or return to office if symptoms worsen/change/persist.  Discussed expected course/resolution/complications of diagnosis in detail with patient. Medication risks/benefits/costs/interactions/alternatives discussed with patient. She was given an after visit summary which includes diagnoses, current medications, & vitals. She expressed understanding with the diagnosis and plan.

## 2017-02-03 NOTE — DISCHARGE INSTRUCTIONS
Stop the levofloxacin (levaquin) and start the doxycycline in the morning. followup with your doctor on Monday    Return to the er if any concerns or if you are getting sicker or not better. Chest Pain: Care Instructions  Your Care Instructions  There are many things that can cause chest pain. Some are not serious and will get better on their own in a few days. But some kinds of chest pain need more testing and treatment. Your doctor may have recommended a follow-up visit in the next 8 to 12 hours. If you are not getting better, you may need more tests or treatment. Even though your doctor has released you, you still need to watch for any problems. The doctor carefully checked you, but sometimes problems can develop later. If you have new symptoms or if your symptoms do not get better, get medical care right away. If you have worse or different chest pain or pressure that lasts more than 5 minutes or you passed out (lost consciousness), call 911 or seek other emergency help right away. A medical visit is only one step in your treatment. Even if you feel better, you still need to do what your doctor recommends, such as going to all suggested follow-up appointments and taking medicines exactly as directed. This will help you recover and help prevent future problems. How can you care for yourself at home? · Rest until you feel better. · Take your medicine exactly as prescribed. Call your doctor if you think you are having a problem with your medicine. · Do not drive after taking a prescription pain medicine. When should you call for help? Call 911 if:  · You passed out (lost consciousness). · You have severe difficulty breathing. · You have symptoms of a heart attack. These may include:  ¨ Chest pain or pressure, or a strange feeling in your chest.  ¨ Sweating. ¨ Shortness of breath. ¨ Nausea or vomiting.   ¨ Pain, pressure, or a strange feeling in your back, neck, jaw, or upper belly or in one or both shoulders or arms. ¨ Lightheadedness or sudden weakness. ¨ A fast or irregular heartbeat. After you call 911, the  may tell you to chew 1 adult-strength or 2 to 4 low-dose aspirin. Wait for an ambulance. Do not try to drive yourself. Call your doctor today if:  · You have any trouble breathing. · Your chest pain gets worse. · You are dizzy or lightheaded, or you feel like you may faint. · You are not getting better as expected. · You are having new or different chest pain. Where can you learn more? Go to http://love-beth.info/. Enter A120 in the search box to learn more about \"Chest Pain: Care Instructions. \"  Current as of: May 27, 2016  Content Version: 11.1  © 5241-8142 [employer]. Care instructions adapted under license by [employer] (which disclaims liability or warranty for this information). If you have questions about a medical condition or this instruction, always ask your healthcare professional. [place] [address] any warranty or liability for your use of this information. Sinusitis: Care Instructions  Your Care Instructions    Sinusitis is an infection of the lining of the sinus cavities in your head. Sinusitis often follows a cold. It causes pain and pressure in your head and face. In most cases, sinusitis gets better on its own in 1 to 2 weeks. But some mild symptoms may last for several weeks. Sometimes antibiotics are needed. Follow-up care is a key part of your treatment and safety. Be sure to make and go to all appointments, and call your doctor if you are having problems. It's also a good idea to know your test results and keep a list of the medicines you take. How can you care for yourself at home? · Take an over-the-counter pain medicine, such as acetaminophen (Tylenol), ibuprofen (Advil, Motrin), or naproxen (Aleve). Read and follow all instructions on the label.   · If the doctor prescribed antibiotics, take them as directed. Do not stop taking them just because you feel better. You need to take the full course of antibiotics. · Be careful when taking over-the-counter cold or flu medicines and Tylenol at the same time. Many of these medicines have acetaminophen, which is Tylenol. Read the labels to make sure that you are not taking more than the recommended dose. Too much acetaminophen (Tylenol) can be harmful. · Breathe warm, moist air from a steamy shower, a hot bath, or a sink filled with hot water. Avoid cold, dry air. Using a humidifier in your home may help. Follow the directions for cleaning the machine. · Use saline (saltwater) nasal washes to help keep your nasal passages open and wash out mucus and bacteria. You can buy saline nose drops at a grocery store or drugstore. Or you can make your own at home by adding 1 teaspoon of salt and 1 teaspoon of baking soda to 2 cups of distilled water. If you make your own, fill a bulb syringe with the solution, insert the tip into your nostril, and squeeze gently. Kadeem Maizes your nose. · Put a hot, wet towel or a warm gel pack on your face 3 or 4 times a day for 5 to 10 minutes each time. · Try a decongestant nasal spray like oxymetazoline (Afrin). Do not use it for more than 3 days in a row. Using it for more than 3 days can make your congestion worse. When should you call for help? Call your doctor now or seek immediate medical care if:  · You have new or worse swelling or redness in your face or around your eyes. · You have a new or higher fever. Watch closely for changes in your health, and be sure to contact your doctor if:  · You have new or worse facial pain. · The mucus from your nose becomes thicker (like pus) or has new blood in it. · You are not getting better as expected. Where can you learn more? Go to http://love-beth.info/.   Enter E424 in the search box to learn more about \"Sinusitis: Care Instructions. \"  Current as of: July 29, 2016  Content Version: 11.1  © 1389-3929 [employer] ([employer]). Care instructions adapted under license by [employer] (which disclaims liability or warranty for this information). If you have questions about a medical condition or this instruction, always ask your healthcare professional. [address] any warranty or liability for your use of this information. Shortness of Breath: Care Instructions  Your Care Instructions  Shortness of breath has many causes. Sometimes conditions such as anxiety can lead to shortness of breath. Some people get mild shortness of breath when they exercise. Trouble breathing also can be a symptom of a serious problem, such as asthma, lung disease, emphysema, heart problems, and pneumonia. If your shortness of breath continues, you may need tests and treatment. Watch for any changes in your breathing and other symptoms. Follow-up care is a key part of your treatment and safety. Be sure to make and go to all appointments, and call your doctor if you are having problems. Its also a good idea to know your test results and keep a list of the medicines you take. How can you care for yourself at home? · Do not smoke or allow others to smoke around you. If you need help quitting, talk to your doctor about stop-smoking programs and medicines. These can increase your chances of quitting for good. · Get plenty of rest and sleep. · Take your medicines exactly as prescribed. Call your doctor if you think you are having a problem with your medicine. · Find healthy ways to deal with stress. ¨ Exercise daily. ¨ Get plenty of sleep. ¨ Eat regularly and well. When should you call for help? Call 911 anytime you think you may need emergency care. For example, call if:  · You have severe shortness of breath. · You have symptoms of a heart attack.  These may include:  ¨ Chest pain or pressure, or a strange feeling in the chest.  ¨ Sweating. ¨ Shortness of breath. ¨ Nausea or vomiting. ¨ Pain, pressure, or a strange feeling in the back, neck, jaw, or upper belly or in one or both shoulders or arms. ¨ Lightheadedness or sudden weakness. ¨ A fast or irregular heartbeat. After you call 911, the  may tell you to chew 1 adult-strength or 2 to 4 low-dose aspirin. Wait for an ambulance. Do not try to drive yourself. Call your doctor now or seek immediate medical care if:  · Your shortness of breath gets worse or you start to wheeze. Wheezing is a high-pitched sound when you breathe. · You wake up at night out of breath or have to prop your head up on several pillows to breathe. · You are short of breath after only light activity or while at rest.  Watch closely for changes in your health, and be sure to contact your doctor if:  · You do not get better over the next 1 to 2 days. Where can you learn more? Go to http://love-beth.info/. Enter S780 in the search box to learn more about \"Shortness of Breath: Care Instructions. \"  Current as of: May 23, 2016  Content Version: 11.1  © 1045-2283 [employer]. Care instructions adapted under license by [employer] (which disclaims liability or warranty for this information). If you have questions about a medical condition or this instruction, always ask your healthcare professional. [place] [address] any warranty or liability for your use of this information.     Recent Results (from the past 24 hour(s))   EKG, 12 LEAD, INITIAL    Collection Time: 02/03/17  1:52 PM   Result Value Ref Range    Ventricular Rate 60 BPM    Atrial Rate 60 BPM    P-R Interval 184 ms    QRS Duration 122 ms    Q-T Interval 450 ms    QTC Calculation (Bezet) 450 ms    Calculated P Axis 54 degrees    Calculated R Axis -56 degrees    Calculated T Axis 5 degrees    Diagnosis       Normal sinus rhythm  Left anterior fascicular block  Left ventricular hypertrophy with QRS widening  When compared with ECG of 14-OCT-2016 05:50,  No significant change was found     CBC WITH AUTOMATED DIFF    Collection Time: 02/03/17  1:55 PM   Result Value Ref Range    WBC 17.3 (H) 3.6 - 11.0 K/uL    RBC 4.91 3.80 - 5.20 M/uL    HGB 13.3 11.5 - 16.0 g/dL    HCT 41.9 35.0 - 47.0 %    MCV 85.3 80.0 - 99.0 FL    MCH 27.1 26.0 - 34.0 PG    MCHC 31.7 30.0 - 36.5 g/dL    RDW 14.2 11.5 - 14.5 %    PLATELET 980 236 - 150 K/uL    NEUTROPHILS 71 32 - 75 %    LYMPHOCYTES 18 12 - 49 %    MONOCYTES 8 5 - 13 %    EOSINOPHILS 2 0 - 7 %    BASOPHILS 1 0 - 1 %    ABS. NEUTROPHILS 12.4 (H) 1.8 - 8.0 K/UL    ABS. LYMPHOCYTES 3.2 0.8 - 3.5 K/UL    ABS. MONOCYTES 1.3 (H) 0.0 - 1.0 K/UL    ABS. EOSINOPHILS 0.4 0.0 - 0.4 K/UL    ABS. BASOPHILS 0.1 0.0 - 0.1 K/UL   METABOLIC PANEL, COMPREHENSIVE    Collection Time: 02/03/17  1:55 PM   Result Value Ref Range    Sodium 134 (L) 136 - 145 mmol/L    Potassium 3.7 3.5 - 5.1 mmol/L    Chloride 101 97 - 108 mmol/L    CO2 26 21 - 32 mmol/L    Anion gap 7 5 - 15 mmol/L    Glucose 64 (L) 65 - 100 mg/dL    BUN 23 (H) 6 - 20 MG/DL    Creatinine 1.04 (H) 0.55 - 1.02 MG/DL    BUN/Creatinine ratio 22 (H) 12 - 20      GFR est AA >60 >60 ml/min/1.73m2    GFR est non-AA 52 (L) >60 ml/min/1.73m2    Calcium 9.1 8.5 - 10.1 MG/DL    Bilirubin, total 0.3 0.2 - 1.0 MG/DL    ALT (SGPT) 18 12 - 78 U/L    AST (SGOT) 18 15 - 37 U/L    Alk.  phosphatase 94 45 - 117 U/L    Protein, total 7.5 6.4 - 8.2 g/dL    Albumin 3.6 3.5 - 5.0 g/dL    Globulin 3.9 2.0 - 4.0 g/dL    A-G Ratio 0.9 (L) 1.1 - 2.2     TROPONIN I    Collection Time: 02/03/17  1:55 PM   Result Value Ref Range    Troponin-I, Qt. <0.04 <0.05 ng/mL   URINALYSIS W/ REFLEX CULTURE    Collection Time: 02/03/17  3:21 PM   Result Value Ref Range    Color YELLOW/STRAW      Appearance CLEAR CLEAR      Specific gravity <1.005 1.003 - 1.030    pH (UA) 7.5 5.0 - 8.0      Protein NEGATIVE  NEG mg/dL    Glucose NEGATIVE  NEG mg/dL    Ketone NEGATIVE  NEG mg/dL    Bilirubin NEGATIVE  NEG      Blood NEGATIVE  NEG      Urobilinogen 0.2 0.2 - 1.0 EU/dL    Nitrites NEGATIVE  NEG      Leukocyte Esterase NEGATIVE  NEG      WBC 0-4 0 - 4 /hpf    RBC 0-5 0 - 5 /hpf    Epithelial cells FEW FEW /lpf    Bacteria NEGATIVE  NEG /hpf    UA:UC IF INDICATED CULTURE NOT INDICATED BY UA RESULT CNI         Xr Chest Pa Lat    Result Date: 2/3/2017  EXAM:  XR CHEST PA LAT INDICATION:  Shortness of breath with flulike symptoms today. COMPARISON: 10/30/2016 TECHNIQUE: PA and lateral chest views FINDINGS: Cardiac monitoring wires overlie the thorax. The cardiomediastinal contours are stable. The pulmonary vasculature is within normal limits. The lungs and pleural spaces are clear. The bones and upper abdomen are stable. IMPRESSION: There is no acute process.  Stable exam.

## 2017-02-03 NOTE — PROGRESS NOTES
Esperanza Samuel is a 68 y.o. female  Chief Complaint   Patient presents with    Cold Symptoms     nasal congestion, chest congestion, cough, chest discomfort and nasal discharge x1 week     Unable to complete med rec. Pt did not bring med list. States all meds are the same as last visit.

## 2017-02-03 NOTE — ED TRIAGE NOTES
Pt arrives via EMS from [address] [place] [address]. Pt seen by PCP for flu like symptoms this morning and then began experiencing shakiness, sob, and nausea after taking prescribed Levaquin.  Pt diagnosed with sinus infection    NSR  BS 76

## 2017-02-03 NOTE — CONSULTS
CARDIOLOGY CONSULT                  Subjective:    Date of  Admission: 2/3/2017  1:44 PM     Admission type:Emergency    Edsno Luu is a 68 y.o. female consulted for CP. She was feeling weak over the past few days with chills, aches and general malaise. She has been on steroids for Cushing's disease since 1970 and will periodically take an extra steroid tablet when she feels in a similar fashion. This did not work and she had recently been started on Levaquin for sinusitis. She took the first pill this AM and lied down. She awoke with some coughing, chest pain, dyspnea, weakness and \"wobbliness\". She walked across the campus of her living facility and went to the RN who sent her to the ED. She notes the chest pain as a minor severity ache in the center of her chest. It has been constant for hours but is worse with deep breathing and coughing and is reproduceable with chest palpation. She notes that she is very cold and occasionally shakes due to this feeling of coldness and that makes the pain worse.      Patient Active Problem List    Diagnosis Date Noted    Controlled type 2 diabetes mellitus with stage 3 chronic kidney disease, with long-term current use of insulin ([address] 01/16/2017    Unstable angina ([address] 10/13/2016    Meningioma ([address] 04/07/2016    Cerebral infarction due to stenosis of left posterior cerebral artery ([address] 04/07/2016    Intracranial vascular stenosis 04/05/2016    Adrenal insufficiency ([address] 01/09/2012    Hypothyroidism 01/09/2012    TIA (transient ischemic attack) 04/22/2011    Essential hypertension, benign 04/22/2011    Recurrent UTI 04/22/2011    RLS (restless legs syndrome) 04/22/2011    Esophageal reflux 04/22/2011    Cushing disease ([address] 04/22/2011    Depression 04/22/2011    Elevated cholesterol 04/22/2011    Vitamin D deficiency 04/22/2011      Janice Goff MD  Past Medical History   Diagnosis Date    Arthritis osteoporosis    Cataract     Diabetes [facility])     Endocrine disease      adrenal insufficiency    GERD (gastroesophageal reflux disease)     H/O Cushing disease     Hypertension     Other ill-defined conditions(799.89)      elizabeth's disease    St[facility] ([facility] [address]      TIA 2011    Thyroid disease hypothyroid    UTI (lower urinary tract infection)       Past Surgical History   Procedure Laterality Date    Hx hysterectomy      Hx tonsillectomy      Hx cataract removal       ON the left    Hx other surgical       adrenal gland removed 1970    Hx appendectomy      Hx cholecystectomy      Hx cholecystectomy  2000    Pr endocrine surgery proc unlisted       adrenal surgery in 1972     Allergies   Allergen Reactions    Amoxicillin Unknown (comments)    Erythromycin Rash and Unknown (comments)     fever      Family History   Problem Relation Age of Onset    Cancer Mother     Diabetes Mother     Stroke Mother     Psychiatric Disorder Mother     Dementia Mother     Headache Mother     Heart Disease Father     Psychiatric Disorder Father     Stroke Father     Asthma Sister     Diabetes Sister     Asthma Brother     Heart Disease Brother       Current Facility-Administered Medications   Medication Dose Route Frequency    nitroglycerin (NITROSTAT) tablet 0.4 mg  0.4 mg SubLINGual Q5MIN PRN     Current Outpatient Prescriptions   Medication Sig    doxycycline (MONODOX) 100 mg capsule Take 1 Cap by mouth two (2) times a day for 10 days.  levoFLOXacin (LEVAQUIN) 500 mg tablet Take 1 Tab by mouth daily. Indications: ACUTE BACTERIAL MAXILLARY SINUSITIS    ascorbic acid, vitamin C, (VITAMIN C) 500 mg tablet Take 1 Tab by mouth daily.  aspirin 81 mg chewable tablet Take 1 Tab by mouth daily.  atorvastatin (LIPITOR) 80 mg tablet Take 1 Tab by mouth nightly.  FLUoxetine (PROZAC) 20 mg tablet Take 1 Tab by mouth nightly.  gabapentin (NEURONTIN) 300 mg capsule Take 1 Cap by mouth nightly.     insulin aspart (NOVOLOG) 100 unit/mL inpn Take 16 units with every meal or as directed.  SITagliptin (JANUVIA) 50 mg tablet Take 1 Tab by mouth daily.  insulin glargine (TOUJEO SOLOSTAR) 300 unit/mL (1.5 mL) inpn 66 Units by SubCUTAneous route nightly. 60 units    fludrocortisone (FLORINEF) 0.1 mg tablet Take 1 Tab by mouth daily.  levothyroxine (SYNTHROID) 100 mcg tablet Take 1 Tab by mouth Daily (before breakfast).  meloxicam (MOBIC) 15 mg tablet Take 15 mg by mouth daily.  hydrocortisone (CORTEF) 5 mg tablet 15mg Qam, 5mg Qpm    pantoprazole (PROTONIX) 40 mg tablet Take 1 Tab by mouth Daily (before breakfast). Indications: GASTROESOPHAGEAL REFLUX    L. acidoph & paracasei- S therm- Bifido (ASUNCION-Q) 8 billion cell cap cap Take 1 Cap by mouth daily.  amLODIPine (NORVASC) 5 mg tablet Take 1 Tab by mouth daily. Indications: HYPERTENSION    furosemide (LASIX) 40 mg tablet Take 40 mg by mouth daily.  lisinopril (PRINIVIL, ZESTRIL) 40 mg tablet Take 40 mg by mouth every morning.  colestipol (COLESTID) 1 gram tablet Take 1 g by mouth two (2) times a day.          Review of Symptoms:  Gen - +chills  Eyes - no vision changes  ENT - no sore throat, rhinorrhea, otalgia  CV - +CP, no palpitations, no orthopnea, no PND, no MERI  Resp + cough, +SOB  GI - no AP, +nausea   - no dysuria, no hematuria  MSK - no abnormal joint pains  Skin - no rashes  Neuro - no HA, no numbness, no weakness, no slurred speech  Psych - no change in mood       Physical Exam    Visit Vitals    /58 (BP 1 Location: Left arm, BP Patient Position: At rest)    Pulse 60    Temp 98.1 °F (36.7 °C)    Resp 16    Ht 5' 2\" (1.575 m)    Wt 86.2 kg (190 lb)    SpO2 100%    BMI 34.75 kg/m2     NAD  Skin warm and dry  Nl conjunctiva  MMM  Neck supple  Reproducable chest wall tenderness  Lungs clear  Normal S1/ S2   No Murmurs, click or Rubs  No S3 or S4  Abdomen soft and non tender  Pulses 2+ radials  Neuro:  Grossly intact  appropriate      Cardiographics    Telemetry: normal sinus rhythm  ECG: NSR, LAFB, LVH     Labs:   Recent Results (from the past 24 hour(s))   EKG, 12 LEAD, INITIAL    Collection Time: 02/03/17  1:52 PM   Result Value Ref Range    Ventricular Rate 60 BPM    Atrial Rate 60 BPM    P-R Interval 184 ms    QRS Duration 122 ms    Q-T Interval 450 ms    QTC Calculation (Bezet) 450 ms    Calculated P Axis 54 degrees    Calculated R Axis -56 degrees    Calculated T Axis 5 degrees    Diagnosis       Normal sinus rhythm  Left anterior fascicular block  Left ventricular hypertrophy with QRS widening  When compared with ECG of 14-OCT-2016 05:50,  No significant change was found     CBC WITH AUTOMATED DIFF    Collection Time: 02/03/17  1:55 PM   Result Value Ref Range    WBC 17.3 (H) 3.6 - 11.0 K/uL    RBC 4.91 3.80 - 5.20 M/uL    HGB 13.3 11.5 - 16.0 g/dL    HCT 41.9 35.0 - 47.0 %    MCV 85.3 80.0 - 99.0 FL    MCH 27.1 26.0 - 34.0 PG    MCHC 31.7 30.0 - 36.5 g/dL    RDW 14.2 11.5 - 14.5 %    PLATELET 993 381 - 290 K/uL    NEUTROPHILS 71 32 - 75 %    LYMPHOCYTES 18 12 - 49 %    MONOCYTES 8 5 - 13 %    EOSINOPHILS 2 0 - 7 %    BASOPHILS 1 0 - 1 %    ABS. NEUTROPHILS 12.4 (H) 1.8 - 8.0 K/UL    ABS. LYMPHOCYTES 3.2 0.8 - 3.5 K/UL    ABS. MONOCYTES 1.3 (H) 0.0 - 1.0 K/UL    ABS. EOSINOPHILS 0.4 0.0 - 0.4 K/UL    ABS. BASOPHILS 0.1 0.0 - 0.1 K/UL   METABOLIC PANEL, COMPREHENSIVE    Collection Time: 02/03/17  1:55 PM   Result Value Ref Range    Sodium 134 (L) 136 - 145 mmol/L    Potassium 3.7 3.5 - 5.1 mmol/L    Chloride 101 97 - 108 mmol/L    CO2 26 21 - 32 mmol/L    Anion gap 7 5 - 15 mmol/L    Glucose 64 (L) 65 - 100 mg/dL    BUN 23 (H) 6 - 20 MG/DL    Creatinine 1.04 (H) 0.55 - 1.02 MG/DL    BUN/Creatinine ratio 22 (H) 12 - 20      GFR est AA >60 >60 ml/min/1.73m2    GFR est non-AA 52 (L) >60 ml/min/1.73m2    Calcium 9.1 8.5 - 10.1 MG/DL    Bilirubin, total 0.3 0.2 - 1.0 MG/DL    ALT (SGPT) 18 12 - 78 U/L    AST (SGOT) 18 15 - 37 U/L    Alk.  phosphatase 94 45 - 117 U/L Protein, total 7.5 6.4 - 8.2 g/dL    Albumin 3.6 3.5 - 5.0 g/dL    Globulin 3.9 2.0 - 4.0 g/dL    A-G Ratio 0.9 (L) 1.1 - 2.2     TROPONIN I    Collection Time: 02/03/17  1:55 PM   Result Value Ref Range    Troponin-I, Qt. <0.04 <0.05 ng/mL   URINALYSIS W/ REFLEX CULTURE    Collection Time: 02/03/17  3:21 PM   Result Value Ref Range    Color YELLOW/STRAW      Appearance CLEAR CLEAR      Specific gravity <1.005 1.003 - 1.030    pH (UA) 7.5 5.0 - 8.0      Protein NEGATIVE  NEG mg/dL    Glucose NEGATIVE  NEG mg/dL    Ketone NEGATIVE  NEG mg/dL    Bilirubin NEGATIVE  NEG      Blood NEGATIVE  NEG      Urobilinogen 0.2 0.2 - 1.0 EU/dL    Nitrites NEGATIVE  NEG      Leukocyte Esterase NEGATIVE  NEG      WBC 0-4 0 - 4 /hpf    RBC 0-5 0 - 5 /hpf    Epithelial cells FEW FEW /lpf    Bacteria NEGATIVE  NEG /hpf    UA:UC IF INDICATED CULTURE NOT INDICATED BY UA RESULT CNI          Assessment and Plan: This is a 68 yof with MMP consulted for chest pain. Mrs Rachid Lopez has atypical chest pain which is most likely related to her acute illness as opposed to a concomitant cardiac event. Her symptoms are non-exertional, reproduceable and current workup has showed no concerning cardiac findings. She has had multiple ischemic evaluations including a cardiac cath and a stress test in October which were low-risk. Would recommend one more set of cardiac markers and then no further ischemic evaluation if markers are negative. Thank you for this consult, please call with questions.      Assessment:

## 2017-02-03 NOTE — MR AVS SNAPSHOT
Visit Information Date & Time Provider Department Dept. Phone Encounter #  
 2/3/2017  9:45 AM MD Mckin[facility] [id number] Follow-up Instructions Return if symptoms worsen or fail to improve. Your Appointments 2/7/2017 10:50 AM  
ROUTINE CARE with MD [facility] [address]) Appt Note: f/u diabetes cp0.00; f/u diabetes cp0.00  
 [address] [place] [address]  
[address] [place] [address]  
  
    
 4/18/2017 10:00 AM  
ROUTINE CARE with MD Mckin[facility] [address]) Appt Note: 3 mths fup for dm  
 [address], [address] [place]  
[address], [place] [address] [id number] Upcoming Health Maintenance Date Due Pneumococcal 65+ High/Highest Risk (2 of 2 - PPSV23) 10/15/2014 MICROALBUMIN Q1 2/11/2017 GLAUCOMA SCREENING Q2Y 2/28/2017* ZOSTER VACCINE AGE 60> 2/28/2017* MEDICARE YEARLY EXAM 2/28/2017* EYE EXAM RETINAL OR DILATED Q1 2/28/2017* HEMOGLOBIN A1C Q6M 6/19/2017 LIPID PANEL Q1 10/13/2017 FOOT EXAM Q1 12/19/2017 DTaP/Tdap/Td series (2 - Td) 3/30/2020 *Topic was postponed. The date shown is not the original due date. Allergies as of 2/3/2017  Review Complete On: 2/3/2017 By: Roe Marcelino MD  
  
 Severity Noted Reaction Type Reactions Amoxicillin  10/13/2016    Unknown (comments) Erythromycin  04/22/2011    Rash, Unknown (comments) fever Current Immunizations  Reviewed on 4/2/2016 Name Date DTaP 3/30/2010 Influenza Vaccine 10/30/2015 Influenza Vaccine Whole 10/15/2010 Pneumococcal Vaccine (Unspecified Type) 10/15/2009 Not reviewed this visit You Were Diagnosed With   
  
 Codes Comments Lower respiratory infection    -  Primary ICD-10-CM: Darryle Shelling ICD-9-CM: 519.8 Vitals BP Pulse Temp Resp Height(growth percentile) Weight(growth percentile) 148/66 81 98.1 °F (36.7 °C) (Oral) 16 5' 3\" (1.6 m) 196 lb 11.2 oz (89.2 kg) SpO2 BMI OB Status Smoking Status 98% 34.84 kg/m2 Postmenopausal Never Smoker Vitals History BMI and BSA Data Body Mass Index Body Surface Area 34.84 kg/m 2 1.99 m 2 Preferred Pharmacy Pharmacy Name [facility] [phone] Your Updated Medication List  
  
   
This list is accurate as of: 2/3/17 10:01 AM.  Always use your most recent med list. amLODIPine 5 mg tablet Commonly known as:  Son Fanti Take 1 Tab by mouth daily. Indications: HYPERTENSION  
  
 ascorbic acid (vitamin C) 500 mg tablet Commonly known as:  VITAMIN C Take 1 Tab by mouth daily. aspirin 81 mg chewable tablet Take 1 Tab by mouth daily. atorvastatin 80 mg tablet Commonly known as:  LIPITOR Take 1 Tab by mouth nightly. colestipol 1 gram tablet Commonly known as:  COLESTID Take 1 g by mouth two (2) times a day. fludrocortisone 0.1 mg tablet Commonly known as:  FLORINEF Take 1 Tab by mouth daily. FLUoxetine 20 mg tablet Commonly known as:  PROzac Take 1 Tab by mouth nightly. furosemide 40 mg tablet Commonly known as:  LASIX Take 40 mg by mouth daily. gabapentin 300 mg capsule Commonly known as:  NEURONTIN Take 1 Cap by mouth nightly. hydrocortisone 5 mg tablet Commonly known as:  CORTEF  
15mg Qam, 5mg Qpm  
  
 insulin aspart 100 unit/mL Inpn Commonly known as:  Dennise Stewart Take 16 units with every meal or as directed. insulin glargine 300 unit/mL (1.5 mL) Inpn Commonly known as:  TOUJEO SOLOSTAR  
66 Units by SubCUTAneous route nightly. 60 units L. acidoph & paracasei- S therm- Bifido 8 billion cell Cap cap Commonly known as:  ASUNCION-Q Take 1 Cap by mouth daily. levoFLOXacin 500 mg tablet Commonly known as:  Tamara Schneiders Take 1 Tab by mouth daily. Indications: ACUTE BACTERIAL MAXILLARY SINUSITIS  
  
 levothyroxine 100 mcg tablet Commonly known as:  SYNTHROID Take 1 Tab by mouth Daily (before breakfast). lisinopril 40 mg tablet Commonly known as:  Ora Bee Take 40 mg by mouth every morning. meloxicam 15 mg tablet Commonly known as:  MOBIC Take 15 mg by mouth daily. pantoprazole 40 mg tablet Commonly known as:  PROTONIX Take 1 Tab by mouth Daily (before breakfast). Indications: GASTROESOPHAGEAL REFLUX SITagliptin 50 mg tablet Commonly known as:  Beth Racer Take 1 Tab by mouth daily. Prescriptions Sent to Pharmacy Refills  
 levoFLOXacin (LEVAQUIN) 500 mg tablet 0 Sig: Take 1 Tab by mouth daily. Indications: ACUTE BACTERIAL MAXILLARY SINUSITIS Class: Normal  
 Pharmacy: [facility] Marissa Pascal  #: [phone] Route: Oral  
  
Follow-up Instructions Return if symptoms worsen or fail to improve. Patient Instructions Drink plenty of fluids. Take Levaquin once daily for 5 days. Don't take any ondansetron while taking Levaquin. Return next week if symptoms aren't improving. Introducing [facility]! Dear Alfreda Castellon: Thank you for requesting a [employer] account. Our records indicate that you already have an active [employer] account. You can access your account anytime at [email or web address] Did you know that you can access your hospital and ER discharge instructions at any time in [employer]? You can also review all of your test results from your hospital stay or ER visit. Additional Information If you have questions, please visit the Frequently Asked Questions section of the zhiwo website at [email or web address] Remember, zhiwo is NOT to be used for urgent needs. For medical emergencies, dial 911. Now available from your iPhone and Android! Please provide this summary of care documentation to your next provider. Your primary care clinician is listed as Brayden Kwok. If you have any questions after today's visit, please call [phone].

## 2017-02-03 NOTE — ED NOTES
Pt provided with crackers and juice d/t low BS. Pt sitting upright in bed resting.  Denies any further needs

## 2017-02-04 LAB
ATRIAL RATE: 60 BPM
CALCULATED P AXIS, ECG09: 54 DEGREES
CALCULATED R AXIS, ECG10: -56 DEGREES
CALCULATED T AXIS, ECG11: 5 DEGREES
DIAGNOSIS, 93000: NORMAL
P-R INTERVAL, ECG05: 184 MS
Q-T INTERVAL, ECG07: 450 MS
QRS DURATION, ECG06: 122 MS
QTC CALCULATION (BEZET), ECG08: 450 MS
VENTRICULAR RATE, ECG03: 60 BPM

## 2017-02-07 ENCOUNTER — OFFICE VISIT (OUTPATIENT)
Dept: ENDOCRINOLOGY | Age: 77
End: 2017-02-07

## 2017-02-07 VITALS — HEIGHT: 62 IN | BODY MASS INDEX: 35.33 KG/M2 | WEIGHT: 192 LBS

## 2017-02-07 DIAGNOSIS — N18.30 CONTROLLED TYPE 2 DIABETES MELLITUS WITH STAGE 3 CHRONIC KIDNEY DISEASE, WITH LONG-TERM CURRENT USE OF INSULIN (HCC): ICD-10-CM

## 2017-02-07 DIAGNOSIS — Z79.4 CONTROLLED TYPE 2 DIABETES MELLITUS WITH STAGE 3 CHRONIC KIDNEY DISEASE, WITH LONG-TERM CURRENT USE OF INSULIN (HCC): ICD-10-CM

## 2017-02-07 DIAGNOSIS — E27.40 ADRENAL INSUFFICIENCY (HCC): Chronic | ICD-10-CM

## 2017-02-07 DIAGNOSIS — E11.22 CONTROLLED TYPE 2 DIABETES MELLITUS WITH STAGE 3 CHRONIC KIDNEY DISEASE, WITH LONG-TERM CURRENT USE OF INSULIN (HCC): ICD-10-CM

## 2017-02-07 DIAGNOSIS — E03.4 HYPOTHYROIDISM DUE TO ACQUIRED ATROPHY OF THYROID: Primary | Chronic | ICD-10-CM

## 2017-02-07 RX ORDER — LEVOFLOXACIN 500 MG/1
TABLET, FILM COATED ORAL
Refills: 0 | COMMUNITY
Start: 2017-02-03 | End: 2017-05-19 | Stop reason: ALTCHOICE

## 2017-02-07 NOTE — PATIENT INSTRUCTIONS
Start taking Toujeo 60 units in the morning. Do not take your Toujeo dose tonight - take your next dose tomorrow morning.

## 2017-02-07 NOTE — PROGRESS NOTES
Endocrinology Visit    Chief Complaint: Type 2 diabetes, adrenal insufficiency, hypothyroidism    History of Present Illness: Ed Dawkins is a 68 y.o. female who returns for f/u of adrenal insufficiency, hypothyroidism, and type 2 diabetes mellitus. Records faxed over from her previous endocrinologist Dr Sudeep Johnson indicate pt had pituitary Cushings - treatment of which involved XRT (presumably to her pituitary) and BL adrenalectomy. She subsequently developed primary AI and is on hydrocortisone and fludrocortisone replacement. I saw her in initial consultation in December at which time I made several adjustments to her medication regimen as detailed by problem below. In the interim she reports feeling well. Energy level is good (most days per caretaker who is with her today). Pt says she is trying to watch her carb intake. Weight is down a few pounds since her visit in December. Regarding her AI, she currently takes HC 15mg Qam, 5mg Qpm. I reduced her morning dose from 20mg to 15mg. At her last visit, her sodium, potassium, and renin levels were normal but pt was unsure if she was taking fludrocortisone. Dr Sudeep Johnson' notes indicate she was taking fludrocortisone 0.1 mg daily (while still taking lasix 40mg daily). In the interim, we have communicated and she confirms she is taking the fludrocortisone. She recently took antibiotics for a URI and doubled up on her steroids while she was sick. Feeling better now - denies fevers/chills. Regarding type 2 diabetes, last A1c was 8.3% in December. At her last visit I added Januvia 50mg daily and decreased her insulin doses slightly due to hypoglycemia. Current glycemic medication regimen is Novolog 16 units TIDcc and Toujeo 60 units Qhs. She has a caretaker who helps with her medicaiton administration but she takes her own injection of Toujeo at bedtime. She admits that she forgets this on occasion.    Home blood glucose monitoring frequency: 2-3 times/day  Glucose range at home:  but most values are in the 100s. Fastings range from  (higher if she wakes up and eats a snack in the middle of the night per her caretaker). The 63 was before lunch one day in January but she also has values in the 200s and 300s before lunch on occasion. There is no clear trend on BG log today (see scanned in document). She does not feel well when BG is in the 80s or lower. This happens about once a week. Known complications include CKD. Last eye exam was over a year ago. Weight has decreased a few lbs. She does exercise, walks every day. [facility] and She does try to restrict dietary carbohydrate intake somewhat. Eats 3 meals/day and does snack on occasion (bagel). She also has a history of hypothyroidism and is currently on generic levothyroxine 100 mcg daily. This was reduced from previous dose of 112 mcg daily in December after her TSH returned too low. She was on 125 mcg before that but TSH was low in May so Dr Natasha Sauer decreased her dose to 112 mcg daily. 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 She takes her medication first thing in the morning on an empty stomach and waits at least 30 minutes before eating or taking her other meds. She does umana insomnia (no trouble falling asleep, just wakes up around midnight every night). Denies palpitations, tremors, changes in bowel habits or energy level.     Review of Systems   General ROS: positive for  - weight loss  Ophthalmic ROS: negative  ENT ROS: negative  Endocrine ROS: negative  Respiratory ROS: no cough, shortness of breath, or wheezing  Cardiovascular ROS: no chest pain or dyspnea on exertion  Gastrointestinal ROS: no abdominal pain, change in bowel habits, or black or bloody stools  Genito-Urinary ROS: positive for - recurrent UTIs  Musculoskeletal ROS: positive for - joint pain and joint stiffness  Neurological ROS: positive for - numbness/tingling, memory loss  Dermatological ROS: negative    Problem List:  Patient Active Problem List   Diagnosis Code    TIA (transient ischemic attack) G45.9    Essential hypertension, benign I10    Recurrent UTI N39.0    RLS (restless legs syndrome) G25.81    Esophageal reflux K21.9    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    Depression F32.9    Elevated cholesterol E78.00    Vitamin D deficiency E55.9    Adrenal insufficiency ([facility]) E27.40    Hypothyroidism E03.9    Intracranial vascular stenosis I67.9    Meningioma ([facility] D32.9    Cerebral infarction due to stenosis of left posterior cerebral artery ([facility]) I63.532    Unstable angina ([facility]) I20.0    Controlled type 2 diabetes mellitus with stage 3 chronic kidney disease, with long-term current use of insulin ([facility]) E11.22, N18.3, Z79.4       Past Medical History:    Past Medical History   Diagnosis Date    Arthritis osteoporosis    Cataract     [facility]     Endocrine disease      adrenal insufficiency    GERD (gastroesophageal reflux disease)     H/O Cushing disease     Hypertension     Other ill-defined conditions(799.89)      elizabeth's disease    Stroke ([facility]      TIA 2011    Thyroid disease hypothyroid    UTI (lower urinary tract infection)        Past Surgical History:  Past Surgical History   Procedure Laterality Date    Hx hysterectomy      Hx tonsillectomy      Hx cataract removal       ON the left    Hx other surgical       adrenal gland removed 1970    Hx appendectomy      Hx cholecystectomy      Hx cholecystectomy  2000    Pr endocrine surgery proc unlisted       adrenal surgery in 1972       Social History:  Social History     Social History    Marital status:      Spouse name: N/A    Number of children: N/A    Years of education: N/A     Occupational History    Not on file.      Social History Main Topics    Smoking status: Never Smoker    Smokeless tobacco: Never Used    Alcohol use No    Drug use: No    Sexual activity: Not Currently     Other Topics Concern    Not on file     Social History Narrative       Family History:  Family History   Problem Relation Age of Onset   [facility] Cancer Mother     Diabetes Mother     Stroke Mother     Psychiatric Disorder Mother     Dementia Mother     Headache Mother     Heart Disease Father     Psychiatric Disorder Father     Stroke Father     Asthma Sister     Diabetes Sister     Asthma Brother     Heart Disease Brother        Medications:     Current Outpatient Prescriptions:     levoFLOXacin (LEVAQUIN) 500 mg tablet, , Disp: , Rfl: 0    insulin glargine (TOUJEO SOLOSTAR) 300 unit/mL (1.5 mL) inpn, 60 Units by SubCUTAneous route nightly., Disp: 3 Adjustable Dose Pre-filled Pen Syringe, Rfl: 11    ascorbic acid, vitamin C, (VITAMIN C) 500 mg tablet, Take 1 Tab by mouth daily. , Disp: 100 Tab, Rfl: 3    aspirin 81 mg chewable tablet, Take 1 Tab by mouth daily. , Disp: 100 Tab, Rfl: 3    atorvastatin (LIPITOR) 80 mg tablet, Take 1 Tab by mouth nightly., Disp: 90 Tab, Rfl: 3    FLUoxetine (PROZAC) 20 mg tablet, Take 1 Tab by mouth nightly., Disp: 90 Tab, Rfl: 3    gabapentin (NEURONTIN) 300 mg capsule, Take 1 Cap by mouth nightly., Disp: 90 Cap, Rfl: 3    insulin aspart (NOVOLOG) 100 unit/mL inpn, Take 16 units with every meal or as directed., Disp: 3 Pen, Rfl: 10    SITagliptin (JANUVIA) 50 mg tablet, Take 1 Tab by mouth daily. , Disp: 30 Tab, Rfl: 11    fludrocortisone (FLORINEF) 0.1 mg tablet, Take 1 Tab by mouth daily. , Disp: 90 Tab, Rfl: 3    levothyroxine (SYNTHROID) 100 mcg tablet, Take 1 Tab by mouth Daily (before breakfast). , Disp: 30 Tab, Rfl: 11    meloxicam (MOBIC) 15 mg tablet, Take 15 mg by mouth daily. , Disp: , Rfl:     hydrocortisone (CORTEF) 5 mg tablet, 15mg Qam, 5mg Qpm, Disp: 120 Tab, Rfl: 11    pantoprazole (PROTONIX) 40 mg tablet, Take 1 Tab by mouth Daily (before breakfast).  Indications: GASTROESOPHAGEAL REFLUX, Disp: 30 Tab, Rfl: 1    amLODIPine (NORVASC) 5 mg tablet, Take 1 Tab by mouth daily. Indications: HYPERTENSION, Disp: 30 Tab, Rfl: 1    furosemide (LASIX) 40 mg tablet, Take 40 mg by mouth daily. , Disp: , Rfl:     lisinopril (PRINIVIL, ZESTRIL) 40 mg tablet, Take 40 mg by mouth every morning., Disp: , Rfl:     colestipol (COLESTID) 1 gram tablet, Take 1 g by mouth two (2) times a day., Disp: , Rfl:     Allergies: Allergies   Allergen Reactions    Amoxicillin Unknown (comments)    Erythromycin Rash and Unknown (comments)     fever       Physical Examination:  Blood pressure (P) 142/69, pulse (P) 63, resp. rate (P) 16, height 5' 2\" (1.575 m), weight 192 lb (87.1 kg), SpO2 (P) 95 %.     Gen: elderly female in no acute distress  HEENT: mucous membranes moist, normocephalic, non traumatic  Thyroid: no enlargement or nodules noted  CAD: normal rate, regular rhythm  PULM: unlabored respirations  EXT: no clubbing, cyanosis or edema  Neuro: grossly non focal, reflexes are brisk but no tremor of outstretched hands  Psych: pleasant, fair insight into medical hx  Skin: warm, dry    Clinical Data Review:  Lab Results   Component Value Date/Time    Hemoglobin A1c 8.3 12/19/2016 12:53 PM     Lab Results   Component Value Date/Time    Sodium 134 02/03/2017 01:55 PM    Potassium 3.7 02/03/2017 01:55 PM    Chloride 101 02/03/2017 01:55 PM    CO2 26 02/03/2017 01:55 PM    Anion gap 7 02/03/2017 01:55 PM    Glucose 64 02/03/2017 01:55 PM    BUN 23 02/03/2017 01:55 PM    Creatinine 1.04 02/03/2017 01:55 PM    BUN/Creatinine ratio 22 02/03/2017 01:55 PM    GFR est AA >60 02/03/2017 01:55 PM    GFR est non-AA 52 02/03/2017 01:55 PM    Calcium 9.1 02/03/2017 01:55 PM     No results found for: MCACR, MCA1, MCA2, MCA3, MCA4, UMCA, MCAU, MICALBRAT, MCAU2, MCALPOCT  Lab Results   Component Value Date/Time    Cholesterol, total 235 10/13/2016 12:13 PM    HDL Cholesterol 39 10/13/2016 12:13 PM    LDL, calculated 116.8 10/13/2016 12:13 PM    VLDL, calculated 79.2 10/13/2016 12:13 PM    Triglyceride 396 10/13/2016 12:13 PM    CHOL/HDL Ratio 6.0 10/13/2016 12:13 PM     Component Value Flag Ref Range Units Status   Renin Activity 1.68   ng/mL/hr Final     Component      Latest Ref Rng & Units 12/19/2016 3/31/2016 3/31/2016          12:53 PM  3:54 AM  3:54 AM   TSH      0.450 - 4.500 uIU/mL 0.044 (L)  1.97   T4, Free      0.82 - 1.77 ng/dL 1.21 0.3 (L)    T4, Total      4.8 - 13.9 ug/dL        Lab Results   Component Value Date/Time    Hemoglobin A1c 8.3 12/19/2016 12:53 PM       Assessment and Plan:  Patient is a 68y.o. year old female here for type 2 diabetes with fair control on multiple daily insulin injection regimen. Declining memory seems to be a barrier to consistent insulin administration but will change Toujeo administration time to help with adherence (caretaker can administer it). Ideally, I would prefer to start a GLP1a in place of mealtime insulin. Options are limited by CKD (had a GFR of 41 but recent GFR was increased to 52) but Tanzeum would be an option since it is studied in pts with reduced GFRs. We are trialing renally-dosed DPP4i first and will consider adding metformin at her next visit if renal function remains stable (at GFR >45). Goal A1c is 7.5 to 8%. Will also work toward decreasing her glucocorticoid replacement to physiologic dose (10 and 5) since higher doses can contribute to hyperglycemia. No adjustment today due to recent illness.     1. Type 2 diabetes mellitus with stage 3 chronic kidney disease, with long-term current use of insulin (HCC)     Glycemic Medication Changes:  - continue Januvia 50mg daily  - continue Toujeo 60 units - move from bedtime to morning administration  - continue Novolog 16 units TIDcc    DM Health Maintenance: pertinent items updated in HM tab  A1c: update at next visit  Cv/Lipids: on atorvastatin  BP/Renal: BP close to goal, on ACEi, microalbumin UTD and nonelevated  Vaccines: per PCP  Podiatry: no active issues, encouraged well-fitting footwear and daily inspection  Neuro: + neuropathy, foot exam UTD  Ophtho: yearly eye exam recommended  Diet and exercise: discussed healthy eating and exercise recommendations, particularly reduction in dietary carbohydrates     2. Adrenal insufficiency ([address]): primary s/p BL adrenalectomy. Continue HC 15mg Qam, 5mg Qpm to work toward more physiologic dose of 10/5. Recent sodium/potassium levels were normal - continue fludrocortisone. 3. Acquired hypothyroidism: euthyroid on exam after recent LT4 dose adjustment (decrease from 112 to 100). Check TFTs to confirm this today. We have discussed the importance of the need for good blood pressure and glycemic control to further reduce the risks of microvascular and macrovascular complications. We have discussed how to recognize and treat hypoglycemia. She will notify me in between appointments for hypoglycemia or persistent hyperglycemia and has my contact information. I spent 40 minutes with the patient today and > 50% of the time was spent counseling the patient about thyroid and adrenal medication management, also diabetes management including medication options and dietary modification. Patient verbalized an understanding and will return to clinic in 3 months. Thank you for the opportunity to participate in this patient's care.     Bridget Zavala MD  [facility]  [address] [place] [address]

## 2017-02-07 NOTE — PROGRESS NOTES
Lab Results   Component Value Date/Time    Hemoglobin A1c 8.3 12/19/2016 12:53 PM    Hemoglobin A1c 9.4 03/31/2016 03:54 AM    Hemoglobin A1c 10.1 01/24/2016 11:28 AM

## 2017-02-07 NOTE — MR AVS SNAPSHOT
Visit Information Date & Time Provider Department Dept. Phone Encounter #  
 2/7/2017 10:50 AM Trudy Masterson MD [facility] [phone] Follow-up Instructions Return in about 3 months (around 5/7/2017). Your Appointments 4/18/2017 10:00 AM  
ROUTINE CARE with MD [address] [facility]) Appt Note: 3 mths fup for dm  
 [address] , [address]  
[address], [phone] Upcoming Health Maintenance Date Due Pneumococcal 65+ High/Highest Risk (2 of 2 - PPSV23) 10/15/2014 MICROALBUMIN Q1 2/11/2017 GLAUCOMA SCREENING Q2Y 2/28/2017* ZOSTER VACCINE AGE 60> 2/28/2017* MEDICARE YEARLY EXAM 2/28/2017* EYE EXAM RETINAL OR DILATED Q1 2/28/2017* HEMOGLOBIN A1C Q6M 6/19/2017 LIPID PANEL Q1 10/13/2017 FOOT EXAM Q1 12/19/2017 DTaP/Tdap/Td series (2 - Td) 3/30/2020 *Topic was postponed. The date shown is not the original due date. Allergies as of 2/7/2017  Review Complete On: 2/7/2017 By: Trudy Masterson MD  
  
 Severity Noted Reaction Type Reactions Amoxicillin  10/13/2016    Unknown (comments) Erythromycin  04/22/2011    Rash, Unknown (comments) fever Current Immunizations  Reviewed on 4/2/2016 Name Date DTaP 3/30/2010 Influenza Vaccine 10/30/2015 Influenza Vaccine Whole 10/15/2010 Pneumococcal Vaccine (Unspecified Type) 10/15/2009 Not reviewed this visit You Were Diagnosed With   
  
 Codes Comments Hypothyroidism due to acquired atrophy of thyroid    -  Primary ICD-10-CM: E03.4 ICD-9-CM: 244.8, 246.8 Controlled type 2 diabetes mellitus with stage 3 chronic kidney disease, with long-term current use of insulin (HCC)     ICD-10-CM: E11.22, N18.3, Z79.4 ICD-9-CM: 250.40, 585.3, V58.67 [facility]ushing [facility]     ICD-10-CM: E24.0 ICD-9-CM: 255.0 Vitals BP Pulse Resp Height(growth percentile) Weight(growth percentile) SpO2  
 (P) 142/69 (P) 63 (P) 16 5' 2\" (1.575 m) 192 lb (87.1 kg) (P) 95% BMI OB Status Smoking Status 35.12 kg/m2 Postmenopausal Never Smoker BMI and BSA Data Body Mass Index Body Surface Area  
 35.12 kg/m 2 1.95 m 2 Preferred Pharmacy Pharmacy Name Phone [facility] [phone] Your Updated Medication List  
  
   
This list is accurate as of: 2/7/17 11:29 AM.  Always use your most recent med list. amLODIPine 5 mg tablet Commonly known as:  Aurelia Darting Take 1 Tab by mouth daily. Indications: HYPERTENSION  
  
 ascorbic acid (vitamin C) 500 mg tablet Commonly known as:  VITAMIN C Take 1 Tab by mouth daily. aspirin 81 mg chewable tablet Take 1 Tab by mouth daily. atorvastatin 80 mg tablet Commonly known as:  LIPITOR Take 1 Tab by mouth nightly. colestipol 1 gram tablet Commonly known as:  COLESTID Take 1 g by mouth two (2) times a day. doxycycline 100 mg capsule Commonly known as:  Thora Deem Take 1 Cap by mouth two (2) times a day for 10 days. fludrocortisone 0.1 mg tablet Commonly known as:  FLORINEF Take 1 Tab by mouth daily. FLUoxetine 20 mg tablet Commonly known as:  PROzac Take 1 Tab by mouth nightly. furosemide 40 mg tablet Commonly known as:  LASIX Take 40 mg by mouth daily. gabapentin 300 mg capsule Commonly known as:  NEURONTIN Take 1 Cap by mouth nightly. hydrocortisone 5 mg tablet Commonly known as:  CORTEF  
15mg Qam, 5mg Qpm  
  
 insulin aspart 100 unit/mL Inpn Commonly known as:  Fiorella Beth Take 16 units with every meal or as directed. insulin glargine 300 unit/mL (1.5 mL) Inpn Commonly known as:  TOUJEO SOLOSTAR  
66 Units by SubCUTAneous route nightly. 60 units L. acidoph & paracasei- S therm- Bifido 8 billion cell Cap cap Commonly known as:  ASUNCION-Q Take 1 Cap by mouth daily. levoFLOXacin 500 mg tablet Commonly known as:  LEVAQUIN  
  
 levothyroxine 100 mcg tablet Commonly known as:  SYNTHROID Take 1 Tab by mouth Daily (before breakfast). lisinopril 40 mg tablet Commonly known as:  Rachel Loss Take 40 mg by mouth every morning. meloxicam 15 mg tablet Commonly known as:  MOBIC Take 15 mg by mouth daily. pantoprazole 40 mg tablet Commonly known as:  PROTONIX Take 1 Tab by mouth Daily (before breakfast). Indications: GASTROESOPHAGEAL REFLUX SITagliptin 50 mg tablet Commonly known as:  Ledesma Raul Take 1 Tab by mouth daily. We Performed the Following TSH AND FREE T4 [93593 CPT(R)] Follow-up Instructions Return in about 3 months (around 5/7/2017). Patient Instructions Start taking Toujeo 60 units in the morning. Do not take your Toujeo dose tonight - take your next dose tomorrow morning. Introducing [facility]! Dear Radha Henao: Thank you for requesting a [facility] account. Our records indicate that you already have an active [facility] account. You can access your account anytime at [email or web address] Did you know that you can access your hospital and ER discharge instructions at any time in [facility]? You can also review all of your test results from your hospital stay or ER visit. Additional Information If you have questions, please visit the Frequently Asked Questions section of the [facility] website at [email or web address] Remember, [facility] is NOT to be used for urgent needs. For medical emergencies, dial 911. Now available from your iPhone and Android! Please provide this summary of care documentation to your next provider. Your primary care clinician is listed as Flo Hernadez.  If you have any questions after today's visit, please call [phone].

## 2017-02-08 LAB
T4 FREE SERPL-MCNC: 1.17 NG/DL (ref 0.82–1.77)
TSH SERPL DL<=0.005 MIU/L-ACNC: 0.07 UIU/ML (ref 0.45–4.5)

## 2017-03-13 RX ORDER — LISINOPRIL 40 MG/1
40 TABLET ORAL
Qty: 90 TAB | Refills: 3 | Status: SHIPPED | OUTPATIENT
Start: 2017-03-13 | End: 2018-03-22 | Stop reason: SDUPTHER

## 2017-03-13 RX ORDER — AMLODIPINE BESYLATE 5 MG/1
5 TABLET ORAL DAILY
Qty: 30 TAB | Refills: 3 | Status: SHIPPED | OUTPATIENT
Start: 2017-03-13 | End: 2017-05-02 | Stop reason: SDUPTHER

## 2017-04-13 DIAGNOSIS — E11.22 CONTROLLED TYPE 2 DIABETES MELLITUS WITH STAGE 3 CHRONIC KIDNEY DISEASE, WITH LONG-TERM CURRENT USE OF INSULIN (HCC): ICD-10-CM

## 2017-04-13 DIAGNOSIS — Z79.4 CONTROLLED TYPE 2 DIABETES MELLITUS WITH STAGE 3 CHRONIC KIDNEY DISEASE, WITH LONG-TERM CURRENT USE OF INSULIN (HCC): ICD-10-CM

## 2017-04-13 DIAGNOSIS — N18.30 CONTROLLED TYPE 2 DIABETES MELLITUS WITH STAGE 3 CHRONIC KIDNEY DISEASE, WITH LONG-TERM CURRENT USE OF INSULIN (HCC): ICD-10-CM

## 2017-04-18 ENCOUNTER — OFFICE VISIT (OUTPATIENT)
Dept: INTERNAL MEDICINE CLINIC | Age: 77
End: 2017-04-18

## 2017-04-18 VITALS
DIASTOLIC BLOOD PRESSURE: 65 MMHG | HEART RATE: 71 BPM | TEMPERATURE: 98.3 F | RESPIRATION RATE: 18 BRPM | HEIGHT: 62 IN | BODY MASS INDEX: 35.35 KG/M2 | WEIGHT: 192.1 LBS | SYSTOLIC BLOOD PRESSURE: 120 MMHG | OXYGEN SATURATION: 96 %

## 2017-04-18 DIAGNOSIS — N18.30 CONTROLLED TYPE 2 DIABETES MELLITUS WITH STAGE 3 CHRONIC KIDNEY DISEASE, WITH LONG-TERM CURRENT USE OF INSULIN (HCC): ICD-10-CM

## 2017-04-18 DIAGNOSIS — F32.A DEPRESSION, UNSPECIFIED DEPRESSION TYPE: Chronic | ICD-10-CM

## 2017-04-18 DIAGNOSIS — E78.00 ELEVATED CHOLESTEROL: Chronic | ICD-10-CM

## 2017-04-18 DIAGNOSIS — Z00.00 ROUTINE GENERAL MEDICAL EXAMINATION AT A HEALTH CARE FACILITY: ICD-10-CM

## 2017-04-18 DIAGNOSIS — I10 ESSENTIAL HYPERTENSION, BENIGN: Chronic | ICD-10-CM

## 2017-04-18 DIAGNOSIS — E11.22 CONTROLLED TYPE 2 DIABETES MELLITUS WITH STAGE 3 CHRONIC KIDNEY DISEASE, WITH LONG-TERM CURRENT USE OF INSULIN (HCC): ICD-10-CM

## 2017-04-18 DIAGNOSIS — Z13.39 SCREENING FOR ALCOHOLISM: ICD-10-CM

## 2017-04-18 DIAGNOSIS — E03.4 HYPOTHYROIDISM DUE TO ACQUIRED ATROPHY OF THYROID: Chronic | ICD-10-CM

## 2017-04-18 DIAGNOSIS — E27.40 ADRENAL INSUFFICIENCY (HCC): Primary | Chronic | ICD-10-CM

## 2017-04-18 DIAGNOSIS — Z79.4 CONTROLLED TYPE 2 DIABETES MELLITUS WITH STAGE 3 CHRONIC KIDNEY DISEASE, WITH LONG-TERM CURRENT USE OF INSULIN (HCC): ICD-10-CM

## 2017-04-18 NOTE — MR AVS SNAPSHOT
Visit Information Date & Time Provider Department Dept. Phone Encounter #  
 4/18/2017 10:00 AM Be Patrick MD [facility] [phone] [id number] Follow-up Instructions Return in about 4 months (around 8/18/2017) for F/U AI and DM. Your Appointments 5/19/2017  9:30 AM  
ROUTINE CARE with Whitney Kussmaul, MD  
[facility] [facility]) Appt Note: f/u diabetes cp0.00  
 [address] [place] [address]  
[address] [facility] [address] [id number] Upcoming Health Maintenance Date Due ZOSTER VACCINE AGE 60> 6/29/2000 MICROALBUMIN Q1 2/11/2017 EYE EXAM RETINAL OR DILATED Q1 7/28/2017* HEMOGLOBIN A1C Q6M 6/19/2017 LIPID PANEL Q1 10/13/2017 FOOT EXAM Q1 12/19/2017 MEDICARE YEARLY EXAM 4/19/2018 GLAUCOMA SCREENING Q2Y 10/12/2018 DTaP/Tdap/Td series (2 - Td) 3/30/2020 *Topic was postponed. The date shown is not the original due date. Allergies as of 4/18/2017  Review Complete On: 4/18/2017 By: Be Patrick MD  
  
 Severity Noted Reaction Type Reactions Amoxicillin  10/13/2016    Unknown (comments) Erythromycin  04/22/2011    Rash, Unknown (comments) fever Current Immunizations  Reviewed on 4/2/2016 Name Date DTaP 3/30/2010 Influenza Vaccine 10/30/2015 Influenza Vaccine Whole 10/15/2010 Pneumococcal Vaccine (Unspecified Type) 10/15/2009 Not reviewed this visit You Were Diagnosed With   
  
 Codes Comments Routine general medical examination at a health care facility     ICD-10-CM: Z00.00 ICD-9-CM: V70.0 Screening for alcoholism     ICD-10-CM: Z13.89 ICD-9-CM: V79.1 Vitals BP Pulse Temp Resp Height(growth percentile) Weight(growth percentile) 120/65 (BP 1 Location: Right arm, BP Patient Position: Sitting) 71 98.3 °F (36.8 °C) (Oral) 18 5' 2\" (1.575 m) 192 lb 1.6 oz (87.1 kg) SpO2 BMI OB Status Smoking Status 96% 35.14 kg/m2 Postmenopausal Never Smoker Vitals History BMI and BSA Data Body Mass Index Body Surface Area  
 35.14 kg/m 2 1.95 m 2 Preferred Pharmacy Pharmacy Name [facility] [phone] Your Updated Medication List  
  
   
This list is accurate as of: 4/18/17 10:31 AM.  Always use your most recent med list. amLODIPine 5 mg tablet Commonly known as:  Padmini Arevalo Take 1 Tab by mouth daily. Indications: hypertension  
  
 ascorbic acid (vitamin C) 500 mg tablet Commonly known as:  VITAMIN C Take 1 Tab by mouth daily. aspirin 81 mg chewable tablet Take 1 Tab by mouth daily. atorvastatin 80 mg tablet Commonly known as:  LIPITOR Take 1 Tab by mouth nightly. colestipol 1 gram tablet Commonly known as:  COLESTID Take 1 g by mouth two (2) times a day. fludrocortisone 0.1 mg tablet Commonly known as:  FLORINEF Take 1 Tab by mouth daily. FLUoxetine 20 mg tablet Commonly known as:  PROzac Take 1 Tab by mouth nightly. furosemide 40 mg tablet Commonly known as:  LASIX Take 40 mg by mouth daily. gabapentin 300 mg capsule Commonly known as:  NEURONTIN Take 1 Cap by mouth nightly. hydrocortisone 5 mg tablet Commonly known as:  CORTEF  
15mg Qam, 5mg Qpm  
  
 insulin aspart 100 unit/mL Inpn Commonly known as:  [place] Take 16 units with every meal or as directed. insulin glargine 300 unit/mL (1.5 mL) Inpn Commonly known as:  TOUJEO SOLOSTAR  
60 Units by SubCUTAneous route nightly. levoFLOXacin 500 mg tablet Commonly known as:  LEVAQUIN  
  
 levothyroxine 100 mcg tablet Commonly known as:  SYNTHROID Take 1 Tab by mouth Daily (before breakfast). lisinopril 40 mg tablet Commonly known as:  Dulcie Mcburney Take 1 Tab by mouth every morning. meloxicam 15 mg tablet Commonly known as:  MOBIC Take 15 mg by mouth daily. pantoprazole 40 mg tablet Commonly known as:  PROTONIX Take 1 Tab by mouth Daily (before breakfast). Indications: GASTROESOPHAGEAL REFLUX SITagliptin 50 mg tablet Commonly known as:  Peder Ai Take 1 Tab by mouth daily. Follow-up Instructions Return in about 4 months (around 8/18/2017) for F/U AI and DM. Patient Instructions Continue your current medications. Use Tumeric twice a day for your hands. Continue to follow up with your specialists. Introducing [facility]! Dear Andrei Ng: Thank you for requesting a [employer] account. Our records indicate that you already have an active [employer] account. You can access your account anytime at [email or web address] Did you know that you can access your hospital and ER discharge instructions at any time in [employer]? You can also review all of your test results from your hospital stay or ER visit. Additional Information If you have questions, please visit the Frequently Asked Questions section of the [employer] website at [email or web address] Remember, [employer] is NOT to be used for urgent needs. For medical emergencies, dial 911. Now available from your iPhone and Android! Please provide this summary of care documentation to your next provider. Your primary care clinician is listed as Nicki Huang. If you have any questions after today's visit, please call [phone].

## 2017-04-18 NOTE — PROGRESS NOTES
Subjective:     Chief Complaint   Patient presents with    Diabetes     She  is a 68y.o. year old female who presents for evaluation. 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 She has had subsequent adrenal insufficiency and is on hydrocortisone and fludrocortisone. She has type 2 DM and is currently treated with Novolog 16 units TID and Toujeo 60 units qhs as well as Januvia 50 mg once daily. No new complaints. Has pain in hands. Historical Data    Past Medical History:   Diagnosis Date    Arthritis osteoporosis    Cataract     Diabetes ([facility] [address]     Endocrine disease     adrenal insufficiency    GERD (gastroesophageal reflux disease)     H/O Cushing disease     Hypertension     Other ill-defined conditions(799.89)     elizabeth's disease    Stroke ([facility] [address]     TIA 2011    Thyroid disease hypothyroid    UTI (lower urinary tract infection)         Past Surgical History:   Procedure Laterality Date    ENDOCRINE SURGERY PROC UNLISTED      adrenal surgery in 1972    HX APPENDECTOMY      HX CATARACT REMOVAL      ON the left    HX CHOLECYSTECTOMY      HX CHOLECYSTECTOMY  2000    HX HYSTERECTOMY      HX OTHER SURGICAL      adrenal gland removed 1970    HX TONSILLECTOMY          Outpatient Encounter Prescriptions as of 4/18/2017   Medication Sig Dispense Refill    SITagliptin (JANUVIA) 50 mg tablet Take 1 Tab by mouth daily. 90 Tab 3    insulin aspart (NOVOLOG) 100 unit/mL inpn Take 16 units with every meal or as directed. 5 Pen 10    amLODIPine (NORVASC) 5 mg tablet Take 1 Tab by mouth daily. Indications: hypertension 30 Tab 3    lisinopril (PRINIVIL, ZESTRIL) 40 mg tablet Take 1 Tab by mouth every morning. 90 Tab 3    insulin glargine (TOUJEO SOLOSTAR) 300 unit/mL (1.5 mL) inpn 60 Units by SubCUTAneous route nightly.  3 Adjustable Dose Pre-filled Pen Syringe 11    ascorbic acid, vitamin C, (VITAMIN C) 500 mg tablet Take 1 Tab by mouth daily. 100 Tab 3    aspirin 81 mg chewable tablet Take 1 Tab by mouth daily. 100 Tab 3    atorvastatin (LIPITOR) 80 mg tablet Take 1 Tab by mouth nightly. (Patient taking differently: Take 40 mg by mouth nightly.) 90 Tab 3    FLUoxetine (PROZAC) 20 mg tablet Take 1 Tab by mouth nightly. 90 Tab 3    gabapentin (NEURONTIN) 300 mg capsule Take 1 Cap by mouth nightly. 90 Cap 3    fludrocortisone (FLORINEF) 0.1 mg tablet Take 1 Tab by mouth daily. 90 Tab 3    levothyroxine (SYNTHROID) 100 mcg tablet Take 1 Tab by mouth Daily (before breakfast). 30 Tab 11    meloxicam (MOBIC) 15 mg tablet Take 15 mg by mouth daily.  hydrocortisone (CORTEF) 5 mg tablet 15mg Qam, 5mg Qpm 120 Tab 11    furosemide (LASIX) 40 mg tablet Take 40 mg by mouth daily.  colestipol (COLESTID) 1 gram tablet Take 1 g by mouth two (2) times a day.  levoFLOXacin (LEVAQUIN) 500 mg tablet   0    pantoprazole (PROTONIX) 40 mg tablet Take 1 Tab by mouth Daily (before breakfast). Indications: GASTROESOPHAGEAL REFLUX 30 Tab 1     No facility-administered encounter medications on file as of 4/18/2017. Allergies   Allergen Reactions    Amoxicillin Unknown (comments)    Erythromycin Rash and Unknown (comments)     fever        Social History     Social History    Marital status:      Spouse name: N/A    Number of children: N/A    Years of education: N/A     Occupational History    Not on file. Social History Main Topics    Smoking status: Never Smoker    Smokeless tobacco: Never Used    Alcohol use No    Drug use: No    Sexual activity: Not Currently     Other Topics Concern    Not on file     Social History Narrative        Review of Systems  A comprehensive review of systems was negative except for that written in the HPI.     Objective:     Vitals:    04/18/17 1005   BP: 120/65   Pulse: 71   Resp: 18   Temp: 98.3 °F (36.8 °C)   TempSrc: Oral   SpO2: 96%   Weight: 192 lb 1.6 oz (87.1 kg)   Height: 5' 2\" (1.575 m)     Pleasant WF in no acute distress. Neck:  Supple. Cardiac: RRR without murmurs, gallops or rubs. Lungs: Clear to auscultation. Abdomen: Soft and non-tender. Extremities:  No edema. Neuro: No focal motor deficits. ASSESSMENT / PLAN:   1. Routine general medical examination at a health care facility  · Medicare wellness exam completed. 2. Screening for alcoholism      3. Adrenal insufficiency (HCC)  · Continue replacement therapy with HC and fludrocortisone    4. Controlled type 2 diabetes mellitus with stage 3 chronic kidney disease, with long-term current use of insulin ([facility] [address]  · Sees Endocrinology. · On Januvia, Toujeo and Novolog. · On ACEI and statin. · Regular eye exams. · Continue strict BP control. 5. Hypothyroidism due to acquired atrophy of thyroid  · Continue current medication    6. Essential hypertension, benign  · Controlled    7. Depression, unspecified depression type  · Supportive counseling provided. 8. Elevated cholesterol  · Continue statin    Patient Instructions   Continue your current medications. Use Tumeric twice a day for your hands. Continue to follow up with your specialists. Follow-up Disposition:  Return in about 4 months (around 8/18/2017) for F/U AI and DM. Advised her to call back or return to office if symptoms worsen/change/persist.  Discussed expected course/resolution/complications of diagnosis in detail with patient. Medication risks/benefits/costs/interactions/alternatives discussed with patient. She was given an after visit summary which includes diagnoses, current medications, & vitals. She expressed understanding with the diagnosis and plan. This is a Subsequent Medicare Annual Wellness Visit providing Personalized Prevention Plan Services (PPPS) (Performed 12 months after initial AWV and PPPS )    I have reviewed the patient's medical history in detail and updated the computerized patient record.      History Past Medical History:   Diagnosis Date    Arthritis osteoporosis    Cataract     Diabetes ([facility] [address]     Endocrine disease     adrenal insufficiency    GERD (gastroesophageal reflux disease)     H/O Cushing disease     Hypertension     Other ill-defined conditions(799.89)     elizabeth's disease    Stroke ([facility] [address]     TIA 2011    Thyroid disease hypothyroid    UTI (lower urinary tract infection)       Past Surgical History:   Procedure Laterality Date    ENDOCRINE SURGERY PROC UNLISTED      adrenal surgery in 1972    HX APPENDECTOMY      HX CATARACT REMOVAL      ON the left    HX CHOLECYSTECTOMY      HX CHOLECYSTECTOMY  2000    HX HYSTERECTOMY      HX OTHER SURGICAL      adrenal gland removed 1970    HX TONSILLECTOMY       Current Outpatient Prescriptions   Medication Sig Dispense Refill    SITagliptin (JANUVIA) 50 mg tablet Take 1 Tab by mouth daily. 90 Tab 3    insulin aspart (NOVOLOG) 100 unit/mL inpn Take 16 units with every meal or as directed. 5 Pen 10    amLODIPine (NORVASC) 5 mg tablet Take 1 Tab by mouth daily. Indications: hypertension 30 Tab 3    lisinopril (PRINIVIL, ZESTRIL) 40 mg tablet Take 1 Tab by mouth every morning. 90 Tab 3    insulin glargine (TOUJEO SOLOSTAR) 300 unit/mL (1.5 mL) inpn 60 Units by SubCUTAneous route nightly. 3 Adjustable Dose Pre-filled Pen Syringe 11    ascorbic acid, vitamin C, (VITAMIN C) 500 mg tablet Take 1 Tab by mouth daily. 100 Tab 3    aspirin 81 mg chewable tablet Take 1 Tab by mouth daily. 100 Tab 3    atorvastatin (LIPITOR) 80 mg tablet Take 1 Tab by mouth nightly. (Patient taking differently: Take 40 mg by mouth nightly.) 90 Tab 3    FLUoxetine (PROZAC) 20 mg tablet Take 1 Tab by mouth nightly. 90 Tab 3    gabapentin (NEURONTIN) 300 mg capsule Take 1 Cap by mouth nightly. 90 Cap 3    fludrocortisone (FLORINEF) 0.1 mg tablet Take 1 Tab by mouth daily.  90 Tab 3    levothyroxine (SYNTHROID) 100 mcg tablet Take 1 Tab by mouth Daily (before breakfast). 30 Tab 11    meloxicam (MOBIC) 15 mg tablet Take 15 mg by mouth daily.  hydrocortisone (CORTEF) 5 mg tablet 15mg Qam, 5mg Qpm 120 Tab 11    furosemide (LASIX) 40 mg tablet Take 40 mg by mouth daily.  colestipol (COLESTID) 1 gram tablet Take 1 g by mouth two (2) times a day.  levoFLOXacin (LEVAQUIN) 500 mg tablet   0    pantoprazole (PROTONIX) 40 mg tablet Take 1 Tab by mouth Daily (before breakfast). Indications: GASTROESOPHAGEAL REFLUX 30 Tab 1     Allergies   Allergen Reactions    Amoxicillin Unknown (comments)    Erythromycin Rash and Unknown (comments)     fever     Family History   Problem Relation Age of Onset   [facility] Cancer Mother     Diabetes Mother    [facility] Stroke Mother     Psychiatric Disorder Mother     Dementia Mother     Headache Mother     Heart Disease Father     Psychiatric Disorder Father     Stroke Father     Asthma Sister     Diabetes Sister     Asthma Brother     Heart Disease Brother      Social History   Substance Use Topics    Smoking status: Never Smoker    Smokeless tobacco: Never Used    Alcohol use No     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    Depression F32.9    Elevated cholesterol E78.00    Vitamin D deficiency E55.9    Adrenal insufficiency (HCC) E27.40    Hypothyroidism E03.9    Intracranial vascular stenosis I67.9    Meningioma ([facility] [address] D32.9    Cerebral infarction due to stenosis of left posterior cerebral artery (HCC) H26.399    Unstable angina (HCC) I20.0    Controlled type 2 diabetes mellitus with stage 3 chronic kidney disease, with long-term current use of insulin (HCC) E11.22, N18.3, Z79.4       Depression Risk Factor Screening:   No flowsheet data found. Alcohol Risk Factor Screening:    On any occasion during the past 3 months, have you had more than 3 drinks containing alcohol? No    Do you average more than 7 drinks per week? No      Functional Ability and Level of Safety:     Hearing Loss   severe    Activities of Daily Living   Self-care. Requires assistance with: no ADLs    Fall Risk     Fall Risk Assessment, last 12 mths 4/18/2017   Able to walk? Yes   Fall in past 12 months? No     Abuse Screen   Patient is not abused    Review of Systems   Pertinent items are noted in HPI. Physical Examination     Evaluation of Cognitive Function:  Mood/affect:  neutral  Appearance: age appropriate and casually dressed  Family member/caregiver input: none    No exam performed today, see elsewhere. Patient Care Team:  Mauri Trujillo MD as PCP - General (Internal Medicine)  Ela Rosenthal MD as Consulting Provider (Endocrinology)    Advice/Referrals/Counseling   Education and counseling provided:  Are appropriate based on today's review and evaluation  End-of-Life planning (with patient's consent)      Assessment/Plan       ICD-10-CM ICD-9-CM    1. Routine general medical examination at a health care facility Z00.00 V70.0    2. Screening for alcoholism Z13.89 V79.1      Encounter Diagnoses   Name Primary?  Routine general medical examination at a health care facility     Screening for alcoholism    .

## 2017-04-18 NOTE — PATIENT INSTRUCTIONS
Continue your current medications. Use Tumeric twice a day for your hands. Continue to follow up with your specialists.

## 2017-04-26 DIAGNOSIS — N18.30 CONTROLLED TYPE 2 DIABETES MELLITUS WITH STAGE 3 CHRONIC KIDNEY DISEASE, WITH LONG-TERM CURRENT USE OF INSULIN (HCC): ICD-10-CM

## 2017-04-26 DIAGNOSIS — E11.22 CONTROLLED TYPE 2 DIABETES MELLITUS WITH STAGE 3 CHRONIC KIDNEY DISEASE, WITH LONG-TERM CURRENT USE OF INSULIN (HCC): ICD-10-CM

## 2017-04-26 DIAGNOSIS — Z79.4 CONTROLLED TYPE 2 DIABETES MELLITUS WITH STAGE 3 CHRONIC KIDNEY DISEASE, WITH LONG-TERM CURRENT USE OF INSULIN (HCC): ICD-10-CM

## 2017-05-02 RX ORDER — AMLODIPINE BESYLATE 5 MG/1
5 TABLET ORAL DAILY
Qty: 90 TAB | Refills: 1 | Status: CANCELLED | OUTPATIENT
Start: 2017-05-02

## 2017-05-02 RX ORDER — AMLODIPINE BESYLATE 5 MG/1
5 TABLET ORAL DAILY
Qty: 90 TAB | Refills: 1 | Status: SHIPPED | OUTPATIENT
Start: 2017-05-02 | End: 2017-07-11 | Stop reason: SDUPTHER

## 2017-05-02 NOTE — TELEPHONE ENCOUNTER
Patient needs a refill on \"Prodigy test strips/no coating blood glucose test strips\". She test 3 times a day, and she would 12 bottles of #50. She is out of test strips, and would like this script sent to her her pharmacy today. [facility].      Last OV:04/18/17  Next OV:08/16/17

## 2017-05-15 DIAGNOSIS — N18.30 CONTROLLED TYPE 2 DIABETES MELLITUS WITH STAGE 3 CHRONIC KIDNEY DISEASE, WITH LONG-TERM CURRENT USE OF INSULIN (HCC): Primary | ICD-10-CM

## 2017-05-15 DIAGNOSIS — Z79.4 CONTROLLED TYPE 2 DIABETES MELLITUS WITH STAGE 3 CHRONIC KIDNEY DISEASE, WITH LONG-TERM CURRENT USE OF INSULIN (HCC): Primary | ICD-10-CM

## 2017-05-15 DIAGNOSIS — E11.22 CONTROLLED TYPE 2 DIABETES MELLITUS WITH STAGE 3 CHRONIC KIDNEY DISEASE, WITH LONG-TERM CURRENT USE OF INSULIN (HCC): Primary | ICD-10-CM

## 2017-05-15 NOTE — TELEPHONE ENCOUNTER
Zita Sicard, patient's caregiver called stating that she had called previously to get a refill of patient's prodigy test strips. She says that this has not been done. Please call her back at [phone]

## 2017-05-16 ENCOUNTER — OFFICE VISIT (OUTPATIENT)
Dept: INTERNAL MEDICINE CLINIC | Age: 77
End: 2017-05-16

## 2017-05-16 VITALS
HEART RATE: 72 BPM | RESPIRATION RATE: 18 BRPM | OXYGEN SATURATION: 94 % | WEIGHT: 187.4 LBS | DIASTOLIC BLOOD PRESSURE: 60 MMHG | SYSTOLIC BLOOD PRESSURE: 110 MMHG | BODY MASS INDEX: 34.48 KG/M2 | TEMPERATURE: 99.1 F | HEIGHT: 62 IN

## 2017-05-16 DIAGNOSIS — M25.521 UPPER ARM JOINT PAIN, RIGHT: Primary | ICD-10-CM

## 2017-05-16 NOTE — PROGRESS NOTES
Chief Complaint   Patient presents with    Joint Pain     right     Reviewed record in preparation for visit and have obtained necessary documentation. Identified pt with two pt identifiers(name and ). Health Maintenance Due   Topic    ZOSTER VACCINE AGE 60>     MICROALBUMIN Q1          Chief Complaint   Patient presents with    Joint Pain     right        Wt Readings from Last 3 Encounters:   17 187 lb 6.4 oz (85 kg)   17 192 lb 1.6 oz (87.1 kg)   17 192 lb (87.1 kg)     Temp Readings from Last 3 Encounters:   17 99.1 °F (37.3 °C) (Oral)   17 98.3 °F (36.8 °C) (Oral)   17 98.1 °F (36.7 °C)     BP Readings from Last 3 Encounters:   17 110/60   17 120/65   17 (P) 142/69     Pulse Readings from Last 3 Encounters:   17 72   17 71   17 (P) 63           Learning Assessment:  :     Learning Assessment 2017   PRIMARY LEARNER Patient   HIGHEST LEVEL OF EDUCATION - PRIMARY LEARNER  > 4 YEARS OF COLLEGE   BARRIERS PRIMARY LEARNER NONE   PRIMARY LANGUAGE ENGLISH   LEARNER PREFERENCE PRIMARY LISTENING   ANSWERED BY patient   RELATIONSHIP SELF       Depression Screening:  :     No flowsheet data found. Fall Risk Assessment:  :     Fall Risk Assessment, last 12 mths 2017   Able to walk? Yes   Fall in past 12 months? No       Abuse Screening:  :     Abuse Screening Questionnaire 2017   Do you ever feel afraid of your partner? N   Are you in a relationship with someone who physically or mentally threatens you? N   Is it safe for you to go home?  Y       Coordination of Care Questionnaire:  :     1) Have you been to an emergency room, urgent care clinic since your last visit? no   Hospitalized since your last visit? no             2) Have you seen or consulted any other health care providers outside of [address] [place] [address] since your last visit? no  (Include any pap smears or colon screenings in this section.)    3) Do you have an Advance Directive on file? yes    4) Are you interested in receiving information on Advance Directives? NO      Patient is accompanied by nursing attendant I have received verbal consent from Opal Ram to discuss any/all medical information while they are present in the room. Reviewed record  In preparation for visit and have obtained necessary documentation.

## 2017-05-16 NOTE — PROGRESS NOTES
Subjective:     Chief Complaint   Patient presents with    Joint Pain     right     She  is a 68y.o. year old female who presents for evaluation. Complaining of joint pain involving right shoulder, elbow and wrist.  Woke up with stiff neck too. Historical Data    Past Medical History:   Diagnosis Date    Arthritis osteoporosis    Cataract     Diabetes ([facility] [address] [facility]     Endocrine disease     adrenal insufficiency    GERD (gastroesophageal reflux disease)     H/O Cushing disease     Hypertension     Other ill-defined conditions(799.89)     elizabeth's disease    Stroke ([facility] [address]     TIA 2011    Thyroid disease hypothyroid    UTI (lower urinary tract infection)         Past Surgical History:   Procedure Laterality Date    ENDOCRINE SURGERY PROC UNLISTED      adrenal surgery in 1972    HX APPENDECTOMY      HX CATARACT REMOVAL      ON the left    HX CHOLECYSTECTOMY      HX CHOLECYSTECTOMY  2000    HX HYSTERECTOMY      HX OTHER SURGICAL      adrenal gland removed 1970    HX TONSILLECTOMY          Outpatient Encounter Prescriptions as of 5/16/2017   Medication Sig Dispense Refill    glucose blood VI test strips (PRODIGY NO CODING) strip Patient test glucose 3 times daily. ICD E11.9 150 Strip 1    glucose blood VI test strips (PRODIGY NO CODING) strip Patient test glucose 3 times daily. ICD E11.9 150 Strip 3    amLODIPine (NORVASC) 5 mg tablet Take 1 Tab by mouth daily. Indications: hypertension 90 Tab 1    insulin glargine (TOUJEO SOLOSTAR) 300 unit/mL (1.5 mL) inpn 60 Units by SubCUTAneous route nightly. 9 Adjustable Dose Pre-filled Pen Syringe 3    SITagliptin (JANUVIA) 50 mg tablet Take 1 Tab by mouth daily. 90 Tab 3    insulin aspart (NOVOLOG) 100 unit/mL inpn Take 16 units with every meal or as directed. 5 Pen 10    lisinopril (PRINIVIL, ZESTRIL) 40 mg tablet Take 1 Tab by mouth every morning. 90 Tab 3    ascorbic acid, vitamin C, (VITAMIN C) 500 mg tablet Take 1 Tab by mouth daily.  100 Tab 3    aspirin 81 mg chewable tablet Take 1 Tab by mouth daily. 100 Tab 3    atorvastatin (LIPITOR) 80 mg tablet Take 1 Tab by mouth nightly. (Patient taking differently: Take 40 mg by mouth nightly.) 90 Tab 3    FLUoxetine (PROZAC) 20 mg tablet Take 1 Tab by mouth nightly. 90 Tab 3    gabapentin (NEURONTIN) 300 mg capsule Take 1 Cap by mouth nightly. 90 Cap 3    fludrocortisone (FLORINEF) 0.1 mg tablet Take 1 Tab by mouth daily. 90 Tab 3    levothyroxine (SYNTHROID) 100 mcg tablet Take 1 Tab by mouth Daily (before breakfast). 30 Tab 11    meloxicam (MOBIC) 15 mg tablet Take 15 mg by mouth daily.  hydrocortisone (CORTEF) 5 mg tablet 15mg Qam, 5mg Qpm 120 Tab 11    pantoprazole (PROTONIX) 40 mg tablet Take 1 Tab by mouth Daily (before breakfast). Indications: GASTROESOPHAGEAL REFLUX 30 Tab 1    furosemide (LASIX) 40 mg tablet Take 40 mg by mouth daily.  colestipol (COLESTID) 1 gram tablet Take 1 g by mouth two (2) times a day.  levoFLOXacin (LEVAQUIN) 500 mg tablet   0     No facility-administered encounter medications on file as of 5/16/2017. Allergies   Allergen Reactions    Amoxicillin Unknown (comments)    Erythromycin Rash and Unknown (comments)     fever        Social History     Social History    Marital status:      Spouse name: N/A    Number of children: N/A    Years of education: N/A     Occupational History    Not on file. Social History Main Topics    Smoking status: Never Smoker    Smokeless tobacco: Never Used    Alcohol use No    Drug use: No    Sexual activity: Not Currently     Other Topics Concern    Not on file     Social History Narrative        Review of Systems  A comprehensive review of systems was negative except for that written in the HPI.     Objective:     Vitals:    05/16/17 1011   BP: 110/60   Pulse: 72   Resp: 18   Temp: 99.1 °F (37.3 °C)   TempSrc: Oral   SpO2: 94%   Weight: 187 lb 6.4 oz (85 kg)   Height: 5' 2\" (1.575 m)     Pleasant elderly female in no acute distress. RUE exam reveals good range of motion in shoulder, elbow and wrist.  No synovitis. Symptoms seem out of proportion to findings. ASSESSMENT / PLAN:   1. Upper arm joint pain, right  · Recommend topical analgesic to be used 3-4 times per day. · Reassurance. Follow-up Disposition:  Return if symptoms worsen or fail to improve. Advised her to call back or return to office if symptoms worsen/change/persist.  Discussed expected course/resolution/complications of diagnosis in detail with patient. Medication risks/benefits/costs/interactions/alternatives discussed with patient. She was given an after visit summary which includes diagnoses, current medications, & vitals. She expressed understanding with the diagnosis and plan.

## 2017-05-19 ENCOUNTER — OFFICE VISIT (OUTPATIENT)
Dept: ENDOCRINOLOGY | Age: 77
End: 2017-05-19

## 2017-05-19 VITALS
HEART RATE: 67 BPM | SYSTOLIC BLOOD PRESSURE: 127 MMHG | WEIGHT: 192 LBS | RESPIRATION RATE: 16 BRPM | HEIGHT: 62 IN | DIASTOLIC BLOOD PRESSURE: 69 MMHG | BODY MASS INDEX: 35.33 KG/M2 | OXYGEN SATURATION: 98 %

## 2017-05-19 DIAGNOSIS — E27.40 ADRENAL INSUFFICIENCY (HCC): Chronic | ICD-10-CM

## 2017-05-19 DIAGNOSIS — E03.4 HYPOTHYROIDISM DUE TO ACQUIRED ATROPHY OF THYROID: Chronic | ICD-10-CM

## 2017-05-19 DIAGNOSIS — Z79.4 CONTROLLED TYPE 2 DIABETES MELLITUS WITH STAGE 3 CHRONIC KIDNEY DISEASE, WITH LONG-TERM CURRENT USE OF INSULIN (HCC): Primary | ICD-10-CM

## 2017-05-19 DIAGNOSIS — E11.22 CONTROLLED TYPE 2 DIABETES MELLITUS WITH STAGE 3 CHRONIC KIDNEY DISEASE, WITH LONG-TERM CURRENT USE OF INSULIN (HCC): Primary | ICD-10-CM

## 2017-05-19 DIAGNOSIS — N18.30 CONTROLLED TYPE 2 DIABETES MELLITUS WITH STAGE 3 CHRONIC KIDNEY DISEASE, WITH LONG-TERM CURRENT USE OF INSULIN (HCC): Primary | ICD-10-CM

## 2017-05-19 RX ORDER — NITROFURANTOIN 25; 75 MG/1; MG/1
CAPSULE ORAL
Refills: 0 | COMMUNITY
Start: 2017-03-24 | End: 2017-07-13 | Stop reason: ALTCHOICE

## 2017-05-19 RX ORDER — TROSPIUM CHLORIDE 20 MG/1
TABLET, FILM COATED ORAL
Refills: 3 | COMMUNITY
Start: 2017-05-15 | End: 2019-03-06 | Stop reason: SDUPTHER

## 2017-05-19 RX ORDER — ESTRADIOL 0.1 MG/G
CREAM VAGINAL
Refills: 3 | COMMUNITY
Start: 2017-03-21 | End: 2019-01-01

## 2017-05-19 RX ORDER — AZITHROMYCIN 250 MG/1
TABLET, FILM COATED ORAL
Refills: 0 | COMMUNITY
Start: 2017-04-19 | End: 2017-05-19 | Stop reason: ALTCHOICE

## 2017-05-19 RX ORDER — METFORMIN HYDROCHLORIDE 500 MG/1
500 TABLET, EXTENDED RELEASE ORAL
Qty: 30 TAB | Refills: 11 | Status: SHIPPED | OUTPATIENT
Start: 2017-05-19 | End: 2017-08-04 | Stop reason: SDUPTHER

## 2017-05-19 RX ORDER — ASPIRIN 81 MG
TABLET, DELAYED RELEASE (ENTERIC COATED) ORAL
Refills: 99 | COMMUNITY
Start: 2017-05-05 | End: 2017-08-10 | Stop reason: SDUPTHER

## 2017-05-19 RX ORDER — OXYBUTYNIN CHLORIDE 5 MG/1
TABLET ORAL
Refills: 2 | COMMUNITY
Start: 2017-03-13 | End: 2018-05-01 | Stop reason: ALTCHOICE

## 2017-05-19 RX ORDER — LACTOBACILLUS RHAMNOSUS GG 10B CELL
CAPSULE ORAL
Refills: 99 | COMMUNITY
Start: 2017-05-08 | End: 2018-04-23 | Stop reason: SDUPTHER

## 2017-05-19 RX ORDER — FLUOXETINE HYDROCHLORIDE 20 MG/1
CAPSULE ORAL
Refills: 3 | COMMUNITY
Start: 2017-04-14 | End: 2017-05-19 | Stop reason: SDUPTHER

## 2017-05-19 RX ORDER — CEFUROXIME AXETIL 250 MG/1
TABLET ORAL
Refills: 0 | COMMUNITY
Start: 2017-04-12 | End: 2017-08-15

## 2017-05-19 RX ORDER — LEVOTHYROXINE SODIUM 100 UG/1
100 TABLET ORAL
Qty: 30 TAB | Refills: 11 | Status: SHIPPED | OUTPATIENT
Start: 2017-05-19 | End: 2017-08-07 | Stop reason: SDUPTHER

## 2017-05-19 NOTE — PATIENT INSTRUCTIONS
Diabetes Medications:  - Toujeo 60 units every morning  - Victoza injection daily according to the instructions below  - start taking metformin XR 500mg daily with dinner  - stop taking Novolog (injection) and Januvia (pill)      We will use victoza in place of Novolog AND Januvia to help with Hemoglobin A1c (3 month test of blood sugar) and weight loss. Take this once daily every morning or in the evening at the same time each day and it doesn't matter if this is before or after a meal.  Start at the 0.6 mg dose for 1 week. If you are tolerating this without significant nausea, increase to the 1.2 mg dose for 1 week. If still tolerating, go up to 1.8 mg daily. If you are able to tolerate this at the 1.2 mg dose, go ahead and fill the prescription as this may need an authorization. If you can't tolerate the 1.8 mg dose, then stay at the highest dose you can tolerate. Watch out for any severe abdominal pain that goes to the back and is associated with nausea or vomiting as this may be due to pancreatitis which is the most severe but rarest side effect of this medication. Please notify me if this occurs.

## 2017-05-19 NOTE — MR AVS SNAPSHOT
Visit Information Date & Time Provider Department Dept. Phone Encounter #  
 5/19/2017  9:30 AM Helena Henry, [address] [facility] Diabetes and Endocrinology [phone] Your Appointments 8/15/2017 10:00 AM  
ROUTINE CARE with MD Jaya [address] [facility]) Appt Note: 4mF/U AI and DM.  
 [address], [facility] [address] [facility], [facility] [address], [facility] [address] [facility] [address] [address] Upcoming Health Maintenance Date Due ZOSTER VACCINE AGE 60> 6/29/2000 MICROALBUMIN Q1 2/11/2017 HEMOGLOBIN A1C Q6M 6/19/2017 EYE EXAM RETINAL OR DILATED Q1 7/28/2017* INFLUENZA AGE 9 TO ADULT 8/1/2017 LIPID PANEL Q1 10/13/2017 FOOT EXAM Q1 12/19/2017 MEDICARE YEARLY EXAM 4/19/2018 GLAUCOMA SCREENING Q2Y 10/12/2018 DTaP/Tdap/Td series (2 - Td) 3/30/2020 *Topic was postponed. The date shown is not the original due date. Allergies as of 5/19/2017  Review Complete On: 5/19/2017 By: Cheko Her Severity Noted Reaction Type Reactions Amoxicillin  10/13/2016    Unknown (comments) Erythromycin  04/22/2011    Rash, Unknown (comments) fever Current Immunizations  Reviewed on 4/2/2016 Name Date DTaP 3/30/2010 Influenza Vaccine 10/30/2015 Influenza Vaccine Whole 10/15/2010 Pneumococcal Vaccine (Unspecified Type) 10/15/2009 Not reviewed this visit You Were Diagnosed With   
  
 Codes Comments Controlled type 2 diabetes mellitus with stage 3 chronic kidney disease, with long-term current use of insulin (HCC)    -  Primary ICD-10-CM: E11.22, N18.3, Z79.4 ICD-9-CM: 250.40, 585.3, V58.67 Hypothyroidism due to acquired atrophy of thyroid     ICD-10-CM: E03.4 ICD-9-CM: 244.8, 246.8 Adrenal insufficiency ([address]     ICD-10-CM: E27.40 ICD-9-CM: 255.41 Vitals BP Pulse Resp Height(growth percentile) Weight(growth percentile) SpO2 127/69 67 16 5' 2\" (1.575 m) 192 lb (87.1 kg) 98% BMI OB Status Smoking Status 35.12 kg/m2 Postmenopausal Never Smoker Vitals History BMI and BSA Data Body Mass Index Body Surface Area  
 35.12 kg/m 2 1.95 m 2 Preferred Pharmacy Pharmacy Name [facility] [phone] Your Updated Medication List  
  
   
This list is accurate as of: 5/19/17 10:19 AM.  Always use your most recent med list. amLODIPine 5 mg tablet Commonly known as:  Ravi Guerra Take 1 Tab by mouth daily. Indications: hypertension  
  
 ascorbic acid (vitamin C) 500 mg tablet Commonly known as:  VITAMIN C Take 1 Tab by mouth daily. * aspirin 81 mg chewable tablet Take 1 Tab by mouth daily. * [facility] ASPIRIN 81 mg tablet Generic drug:  aspirin delayed-release  
  
 atorvastatin 80 mg tablet Commonly known as:  LIPITOR Take 1 Tab by mouth nightly. cefUROXime 250 mg tablet Commonly known as:  CEFTIN  
  
 colestipol 1 gram tablet Commonly known as:  COLESTID Take 1 g by mouth two (2) times a day. CULTURELLE PROBIOTICS 10 billion cell -200 mg Cpsp Generic drug:  Lactobac. rhamnosus GG-inulin ESTRACE 0.01 % (0.1 mg/gram) vaginal cream  
Generic drug:  estradiol  
  
 fludrocortisone 0.1 mg tablet Commonly known as:  FLORINEF Take 1 Tab by mouth daily. FLUoxetine 20 mg tablet Commonly known as:  PROzac Take 1 Tab by mouth nightly. furosemide 40 mg tablet Commonly known as:  LASIX Take 40 mg by mouth daily. gabapentin 300 mg capsule Commonly known as:  NEURONTIN Take 1 Cap by mouth nightly. glucose blood VI test strips strip Commonly known as:  PRODIGY NO CODING Patient test glucose 3 times daily. ICD E11.9  
  
 hydrocortisone 5 mg tablet Commonly known as:  CORTEF  
15mg Qam, 5mg Qpm  
  
 insulin glargine 300 unit/mL (1.5 mL) Inpn Commonly known as:  TOUJEO SOLOSTAR  
60 Units by SubCUTAneous route nightly. levothyroxine 100 mcg tablet Commonly known as:  SYNTHROID Take 1 Tab by mouth Daily (before breakfast). Liraglutide 0.6 mg/0.1 mL (18 mg/3 mL) sub-q pen Commonly known as:  VICTOZA  
1.8mg daily  
  
 lisinopril 40 mg tablet Commonly known as:  Elijah Apo Take 1 Tab by mouth every morning. meloxicam 15 mg tablet Commonly known as:  MOBIC Take 15 mg by mouth daily. metFORMIN  mg tablet Commonly known as:  GLUCOPHAGE XR Take 1 Tab by mouth daily (with dinner). nitrofurantoin (macrocrystal-monohydrate) 100 mg capsule Commonly known as:  MACROBID  
  
 oxybutynin 5 mg tablet Commonly known as:  DITROPAN  
  
 pantoprazole 40 mg tablet Commonly known as:  PROTONIX Take 1 Tab by mouth Daily (before breakfast). Indications: GASTROESOPHAGEAL REFLUX SITagliptin 50 mg tablet Commonly known as:  Nancy Ferrari Take 1 Tab by mouth daily. trospium 20 mg tablet Commonly known as:  Nora Adame TAKE 1 TABLET BY MOUTH TWICE A DAY. * Notice: This list has 2 medication(s) that are the same as other medications prescribed for you. Read the directions carefully, and ask your doctor or other care provider to review them with you. Prescriptions Sent to Pharmacy Refills Liraglutide (VICTOZA) 0.6 mg/0.1 mL (18 mg/3 mL) sub-q pen 3 Si.8mg daily Class: Normal  
 Pharmacy: [address] Ph #: [phone]  
 levothyroxine (SYNTHROID) 100 mcg tablet 11 Sig: Take 1 Tab by mouth Daily (before breakfast). Class: Normal  
 Pharmacy: [address] Ph #: [phone] Route: Oral  
 metFORMIN ER (GLUCOPHAGE XR) 500 mg tablet 11 Sig: Take 1 Tab by mouth daily (with dinner).   
 Class: Normal  
 Pharmacy: [address] [place] [address] Dr. Cummings #: [phone] Route: Oral  
  
We Performed the Following BASIC METABOLIC PANEL (7) [45351 CPT(R)] HEMOGLOBIN A1C WITH EAG [74468 CPT(R)] TSH AND FREE T4 [17701 CPT(R)] Patient Instructions Diabetes Medications: - Toujeo 60 units every morning 
- Victoza injection daily according to the instructions below 
- start taking metformin XR 500mg daily with dinner 
- stop taking Novolog (injection) and Januvia (pill) We will use victoza in place of Novolog AND Januvia to help with Hemoglobin A1c (3 month test of blood sugar) and weight loss. Take this once daily every morning or in the evening at the same time each day and it doesn't matter if this is before or after a meal.  Start at the 0.6 mg dose for 1 week. If you are tolerating this without significant nausea, increase to the 1.2 mg dose for 1 week. If still tolerating, go up to 1.8 mg daily. If you are able to tolerate this at the 1.2 mg dose, go ahead and fill the prescription as this may need an authorization. If you can't tolerate the 1.8 mg dose, then stay at the highest dose you can tolerate. Watch out for any severe abdominal pain that goes to the back and is associated with nausea or vomiting as this may be due to pancreatitis which is the most severe but rarest side effect of this medication. Please notify me if this occurs. Introducing [facility]! Dear Saige Rock: Thank you for requesting a [employer] account. Our records indicate that you already have an active [employer] account. You can access your account anytime at [email or web address] Did you know that you can access your hospital and ER discharge instructions at any time in [employer]? You can also review all of your test results from your hospital stay or ER visit. Additional Information If you have questions, please visit the Frequently Asked Questions section of the "[employer]"hart website at [email or web address] Remember, [employer] is NOT to be used for urgent needs. For medical emergencies, dial 911. Now available from your iPhone and Android! Please provide this summary of care documentation to your next provider. Your primary care clinician is listed as Norm Hatchet. If you have any questions after today's visit, please call [phone].

## 2017-05-19 NOTE — PROGRESS NOTES
Lab Results   Component Value Date/Time    Hemoglobin A1c 8.3 12/19/2016 12:53 PM    Hemoglobin A1c 9.4 03/31/2016 03:54 AM    Hemoglobin A1c 10.1 01/24/2016 11:28 AM     Lab Results   Component Value Date/Time    TSH 0.073 02/07/2017 12:14 PM    T4, Free 1.17 02/07/2017 12:14 PM    T4, Total 10.1 04/23/2011 04:07 AM

## 2017-05-19 NOTE — PROGRESS NOTES
Endocrinology Visit    Chief Complaint: Type 2 diabetes, adrenal insufficiency, hypothyroidism    History of Present Illness: Arash Lam is a 68 y.o. female who returns for f/u of adrenal insufficiency, hypothyroidism, and type 2 diabetes mellitus. Records faxed over from her previous endocrinologist Dr Keegan Mariano indicate pt had pituitary Cushings - treatment of which involved XRT (presumably to her pituitary) and BL adrenalectomy. She subsequently developed primary AI and is on hydrocortisone and fludrocortisone replacement. I saw her in initial consultation in December at which time I made several adjustments to her medication regimen as detailed by problem below. In the interim she reports feeling well but blood sugars seem to be running higher than normal. She complains of right arm pain for the past few weeks. Energy level is good (most days per caretaker who is with her today). Weight is down a few pounds since her visit in February. Regarding type 2 diabetes, last A1c was 8.3% in December. Current glycemic medication regimen is Januvia 50mg daily, Novolog 16 units TIDcc and Toujeo 60 units Qam. She does not use correction scale. She has a caretaker who helps with her medication administration. Home blood glucose monitoring frequency: 2-3 times/day  Glucose range at home:  but most values are in the 200s. The 66 was before lunch one day but she also has values in the 200s and 300s before lunch on occasion. There is no clear trend on BG log today. She does not feel well when BG is in the 80s or lower. This happens about once a week. Known complications include CKD. Last eye exam was over a year ago. Weight has decreased a few lbs. She does exercise, walks every day. Her food is provided at her [facility]. She does try to restrict dietary carbohydrate intake somewhat but has been eating dessert every night lately. Eats 3 meals/day and does snack on occasion (bagel).      She also has a history of hypothyroidism and is currently on generic levothyroxine 100 mcg daily. This was reduced from previous dose of 112 mcg daily in December after her TSH returned too low. She was on 125 mcg before that but TSH was low in May so Dr Nitish Taveras decreased her dose to 112 mcg daily. 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 She takes her medication first thing in the morning on an empty stomach and waits at least 30 minutes before eating or taking her other meds. She does umana insomnia (no trouble falling asleep, just wakes up around midnight every night). Denies palpitations, tremors, changes in bowel habits or energy level. Regarding her AI, she currently takes HC 15mg Qam, 5mg Qpm. I reduced her morning dose from 20mg to 15mg. She is also taking fludrocortisone 0.1 mg daily.  At her last visit, her sodium, potassium, and renin levels were normal.     Review of Systems   General ROS: positive for  - weight loss  Ophthalmic ROS: negative  ENT ROS: negative  Endocrine ROS: negative  Respiratory ROS: no cough, shortness of breath, or wheezing  Cardiovascular ROS: no chest pain or dyspnea on exertion  Gastrointestinal ROS: no abdominal pain, change in bowel habits, or black or bloody stools  Genito-Urinary ROS: positive for - recurrent UTIs  Musculoskeletal ROS: positive for - joint pain and joint stiffness  Neurological ROS: positive for - numbness/tingling, memory loss  Dermatological ROS: negative    Problem List:  Patient Active Problem List   Diagnosis Code    TIA (transient ischemic attack) G45.9    Essential hypertension, benign I10    Recurrent UTI N39.0    RLS (restless legs syndrome) G25.81    Esophageal reflux K21.9    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    Depression F32.9    Elevated cholesterol E78.00    Vitamin D deficiency E55.9    Adrenal insufficiency (HCC) E27.40    Hypothyroidism E03.9    Intracranial vascular stenosis I67.9    Meningioma ([facility]) D32.9    Cerebral infarction due to stenosis of left posterior cerebral artery ([facility]) I63.532    Unstable angina ([facility]) I20.0    Controlled type 2 diabetes mellitus with stage 3 chronic kidney disease, with long-term current use of insulin ([facility]) E11.22, N18.3, Z79.4       Past Medical History:    Past Medical History:   Diagnosis Date    Arthritis osteoporosis    Cataract     Diabetes ([facility]     Endocrine disease     adrenal insufficiency    GERD (gastroesophageal reflux disease)     H/O Cushing disease     Hypertension     Other ill-defined conditions     elizabeth's disease    Stroke ([facility]     TIA 2011    Thyroid disease hypothyroid    UTI (lower urinary tract infection)        Past Surgical History:  Past Surgical History:   Procedure Laterality Date    ENDOCRINE SURGERY PROC UNLISTED      adrenal surgery in 1972    HX APPENDECTOMY      HX CATARACT REMOVAL      ON the left    HX CHOLECYSTECTOMY      HX CHOLECYSTECTOMY  2000    HX HYSTERECTOMY      HX OTHER SURGICAL      adrenal gland removed 1970    HX TONSILLECTOMY         Social History:  Social History     Social History    Marital status:      Spouse name: N/A    Number of children: N/A    Years of education: N/A     Occupational History    Not on file.      Social History Main Topics    Smoking status: Never Smoker    Smokeless tobacco: Never Used    Alcohol use No    Drug use: No    Sexual activity: Not Currently     Other Topics Concern    Not on file     Social History Narrative       Family History:  Family History   Problem Relation Age of Onset   Geovany Cyp Cancer Mother     Diabetes Mother    Geovany Cypher Stroke Mother     Psychiatric Disorder Mother     Dementia Mother     Headache Mother     Heart Disease Father     Psychiatric Disorder Father     Stroke Father     Asthma Sister     Diabetes Sister     Asthma Brother     Heart Disease Brother        Medications: Current Outpatient Prescriptions:     CULTURELLE PROBIOTICS 10 billion cell -200 mg cpSP, , Disp: , Rfl: 99    cefUROXime (CEFTIN) 250 mg tablet, , Disp: , Rfl: 0    ESTRACE 0.01 % (0.1 mg/gram) vaginal cream, , Disp: , Rfl: 3    nitrofurantoin, macrocrystal-monohydrate, (MACROBID) 100 mg capsule, , Disp: , Rfl: 0    oxybutynin (DITROPAN) 5 mg tablet, , Disp: , Rfl: 2    glucose blood VI test strips (PRODIGY NO CODING) strip, Patient test glucose 3 times daily. ICD E11.9, Disp: 150 Strip, Rfl: 1    amLODIPine (NORVASC) 5 mg tablet, Take 1 Tab by mouth daily. Indications: hypertension, Disp: 90 Tab, Rfl: 1    insulin glargine (TOUJEO SOLOSTAR) 300 unit/mL (1.5 mL) inpn, 60 Units by SubCUTAneous route nightly., Disp: 9 Adjustable Dose Pre-filled Pen Syringe, Rfl: 3    SITagliptin (JANUVIA) 50 mg tablet, Take 1 Tab by mouth daily. , Disp: 90 Tab, Rfl: 3    insulin aspart (NOVOLOG) 100 unit/mL inpn, Take 16 units with every meal or as directed., Disp: 5 Pen, Rfl: 10    lisinopril (PRINIVIL, ZESTRIL) 40 mg tablet, Take 1 Tab by mouth every morning., Disp: 90 Tab, Rfl: 3    ascorbic acid, vitamin C, (VITAMIN C) 500 mg tablet, Take 1 Tab by mouth daily. , Disp: 100 Tab, Rfl: 3    aspirin 81 mg chewable tablet, Take 1 Tab by mouth daily. , Disp: 100 Tab, Rfl: 3    atorvastatin (LIPITOR) 80 mg tablet, Take 1 Tab by mouth nightly. (Patient taking differently: Take 40 mg by mouth nightly.), Disp: 90 Tab, Rfl: 3    FLUoxetine (PROZAC) 20 mg tablet, Take 1 Tab by mouth nightly., Disp: 90 Tab, Rfl: 3    gabapentin (NEURONTIN) 300 mg capsule, Take 1 Cap by mouth nightly., Disp: 90 Cap, Rfl: 3    fludrocortisone (FLORINEF) 0.1 mg tablet, Take 1 Tab by mouth daily. , Disp: 90 Tab, Rfl: 3    levothyroxine (SYNTHROID) 100 mcg tablet, Take 1 Tab by mouth Daily (before breakfast). , Disp: 30 Tab, Rfl: 11    meloxicam (MOBIC) 15 mg tablet, Take 15 mg by mouth daily. , Disp: , Rfl:     hydrocortisone (CORTEF) 5 mg tablet, 15mg Qam, 5mg Qpm, Disp: 120 Tab, Rfl: 11    furosemide (LASIX) 40 mg tablet, Take 40 mg by mouth daily. , Disp: , Rfl:     colestipol (COLESTID) 1 gram tablet, Take 1 g by mouth two (2) times a day., Disp: , Rfl:     trospium (SANCTURA) 20 mg tablet, TAKE 1 TABLET BY MOUTH TWICE A DAY., Disp: , Rfl: 3    [facility] ASPIRIN 81 mg tablet, , Disp: , Rfl: 99    pantoprazole (PROTONIX) 40 mg tablet, Take 1 Tab by mouth Daily (before breakfast). Indications: GASTROESOPHAGEAL REFLUX, Disp: 30 Tab, Rfl: 1    Allergies: Allergies   Allergen Reactions    Amoxicillin Unknown (comments)    Erythromycin Rash and Unknown (comments)     fever       Physical Examination:  Blood pressure 127/69, pulse 67, resp. rate 16, height 5' 2\" (1.575 m), weight 192 lb (87.1 kg), SpO2 98 %.     Gen: elderly female in no acute distress  HEENT: mucous membranes moist, normocephalic, non traumatic  Thyroid: no enlargement or nodules noted  CAD: normal rate, regular rhythm  PULM: unlabored respirations  EXT: no clubbing, cyanosis or edema  Neuro: grossly non focal, reflexes are brisk but no tremor of outstretched hands  Psych: pleasant, fair insight into medical hx  Skin: warm, dry    Clinical Data Review:  Lab Results   Component Value Date/Time    Hemoglobin A1c 8.3 12/19/2016 12:53 PM     Lab Results   Component Value Date/Time    Sodium 134 02/03/2017 01:55 PM    Potassium 3.7 02/03/2017 01:55 PM    Chloride 101 02/03/2017 01:55 PM    CO2 26 02/03/2017 01:55 PM    Anion gap 7 02/03/2017 01:55 PM    Glucose 64 02/03/2017 01:55 PM    BUN 23 02/03/2017 01:55 PM    Creatinine 1.04 02/03/2017 01:55 PM    BUN/Creatinine ratio 22 02/03/2017 01:55 PM    GFR est AA >60 02/03/2017 01:55 PM    GFR est non-AA 52 02/03/2017 01:55 PM    Calcium 9.1 02/03/2017 01:55 PM     No results found for: MCACR, MCA1, MCA2, MCA3, MCA4, UMCA, MCAU, MICALBRAT, MCAU2, MCALPOCT  Lab Results   Component Value Date/Time    Cholesterol, total 235 10/13/2016 12:13 PM    HDL Cholesterol 39 10/13/2016 12:13 PM    LDL, calculated 116.8 10/13/2016 12:13 PM    VLDL, calculated 79.2 10/13/2016 12:13 PM    Triglyceride 396 10/13/2016 12:13 PM    CHOL/HDL Ratio 6.0 10/13/2016 12:13 PM     Component Value Flag Ref Range Units Status   Renin Activity 1.68   ng/mL/hr Final     Component      Latest Ref Rng & Units 12/19/2016 3/31/2016 3/31/2016          12:53 PM  3:54 AM  3:54 AM   TSH      0.450 - 4.500 uIU/mL 0.044 (L)  1.97   T4, Free      0.82 - 1.77 ng/dL 1.21 0.3 (L)    T4, Total      4.8 - 13.9 ug/dL        Lab Results   Component Value Date/Time    Hemoglobin A1c 8.3 12/19/2016 12:53 PM       Assessment and Plan:  Patient is a 68y.o. year old female here for type 2 diabetes with suboptimal control on multiple daily insulin injection regimen (based on BG data, will check A1c today as well). I would prefer to start a GLP1a in place of mealtime insulin. Options are limited by CKD (had a GFR of 41 but recent GFR was increased to 52) but either Victoza or Tanzeum would be options since they are studied in pts with reduced GFRs. Will trial switching from Novolog + Januvia to Victoza + metformin. Discussed with with pt's pharmarcy to ensure her medication transition is smooth. Cost of Victoza will not exceed the cost of Novolog (>$200/mo) and Januvia (~$150/mo). Goal A1c is 7.5 to 8%. Will also work toward decreasing her glucocorticoid replacement to physiologic dose (10 and 5) since higher doses can contribute to hyperglycemia.      1. Type 2 diabetes mellitus with stage 3 chronic kidney disease, with long-term current use of insulin (HCC)     Glycemic Medication Changes:  - start Victoza (instructions provided on uptitration 0.6-->1.2-->1.8)  - start metformin XR 500mg daily (increase at next visit if tolerated)  - continue Toujeo 60 units Qam  - discontinue Novolog and Januvia    DM Health Maintenance: pertinent items updated in HM tab  A1c: update today  Cv/Lipids: on atorvastatin  BP/Renal: BP close to goal, on ACEi, microalbumin UTD and nonelevated  Vaccines: per PCP  Podiatry: no active issues, encouraged well-fitting footwear and daily inspection  Neuro: + neuropathy, foot exam UTD  Ophtho: yearly eye exam recommended  Diet and exercise: discussed healthy eating and exercise recommendations, particularly reduction in dietary carbohydrates     2. Adrenal insufficiency ([address]): primary s/p BL adrenalectomy. Continue HC 15mg Qam, 5mg Qpm to work toward more physiologic dose of 10/5. Recent sodium/potassium levels were normal - continue fludrocortisone. Check BMP today. 3. Acquired hypothyroidism: euthyroid on exam after recent LT4 dose adjustment (decrease from 112 to 100). Check TFTs to confirm this today. Following FT4 more than TSH due to possible central hypothyroidism. I spent 40 minutes with the patient today and > 50% of the time was spent counseling the patient about thyroid and adrenal medication management, also diabetes management including medication options and dietary modification. Patient verbalized an understanding and will return to clinic in 3 months. Thank you for the opportunity to participate in this patient's care.     Jermaine Ross MD  [facility]  [address] [place] [address]

## 2017-05-20 LAB
BUN SERPL-MCNC: 22 MG/DL (ref 8–27)
BUN/CREAT SERPL: 19 (ref 12–28)
CHLORIDE SERPL-SCNC: 102 MMOL/L (ref 96–106)
CO2 SERPL-SCNC: 22 MMOL/L (ref 18–29)
CREAT SERPL-MCNC: 1.13 MG/DL (ref 0.57–1)
EST. AVERAGE GLUCOSE BLD GHB EST-MCNC: 229 MG/DL
GLUCOSE SERPL-MCNC: 131 MG/DL (ref 65–99)
HBA1C MFR BLD: 9.6 % (ref 4.8–5.6)
POTASSIUM SERPL-SCNC: 4.6 MMOL/L (ref 3.5–5.2)
SODIUM SERPL-SCNC: 140 MMOL/L (ref 134–144)
T4 FREE SERPL-MCNC: 1.17 NG/DL (ref 0.82–1.77)
TSH SERPL DL<=0.005 MIU/L-ACNC: 0.11 UIU/ML (ref 0.45–4.5)

## 2017-06-28 RX ORDER — FUROSEMIDE 40 MG/1
40 TABLET ORAL DAILY
Qty: 90 TAB | Refills: 3 | Status: SHIPPED | OUTPATIENT
Start: 2017-06-28 | End: 2018-04-23 | Stop reason: SDUPTHER

## 2017-07-11 RX ORDER — AMLODIPINE BESYLATE 5 MG/1
TABLET ORAL
Qty: 30 TAB | Refills: 3 | Status: SHIPPED | OUTPATIENT
Start: 2017-07-11 | End: 2017-11-10 | Stop reason: SDUPTHER

## 2017-07-13 ENCOUNTER — OFFICE VISIT (OUTPATIENT)
Dept: INTERNAL MEDICINE CLINIC | Age: 77
End: 2017-07-13

## 2017-07-13 ENCOUNTER — HOSPITAL ENCOUNTER (OUTPATIENT)
Dept: LAB | Age: 77
Discharge: HOME | End: 2017-07-13
Payer: MEDICARE

## 2017-07-13 VITALS
HEART RATE: 78 BPM | SYSTOLIC BLOOD PRESSURE: 130 MMHG | RESPIRATION RATE: 17 BRPM | TEMPERATURE: 98.5 F | DIASTOLIC BLOOD PRESSURE: 72 MMHG | WEIGHT: 178.4 LBS | HEIGHT: 62 IN | OXYGEN SATURATION: 98 % | BODY MASS INDEX: 32.83 KG/M2

## 2017-07-13 DIAGNOSIS — R30.9 PAINFUL URINATION: Primary | ICD-10-CM

## 2017-07-13 DIAGNOSIS — R31.9 URINARY TRACT INFECTION WITH HEMATURIA, SITE UNSPECIFIED: ICD-10-CM

## 2017-07-13 DIAGNOSIS — N39.0 URINARY TRACT INFECTION WITH HEMATURIA, SITE UNSPECIFIED: ICD-10-CM

## 2017-07-13 LAB
BILIRUB UR QL STRIP: NEGATIVE
GLUCOSE UR-MCNC: NEGATIVE MG/DL
KETONES P FAST UR STRIP-MCNC: NEGATIVE MG/DL
PH UR STRIP: 7 [PH] (ref 4.6–8)
PROT UR QL STRIP: NEGATIVE MG/DL
SP GR UR STRIP: 1.01 (ref 1–1.03)
UA UROBILINOGEN AMB POC: NORMAL (ref 0.2–1)
URINALYSIS CLARITY POC: NORMAL
URINALYSIS COLOR POC: NORMAL
URINE BLOOD POC: NORMAL
URINE LEUKOCYTES POC: NORMAL
URINE NITRITES POC: POSITIVE

## 2017-07-13 PROCEDURE — 87186 SC STD MICRODIL/AGAR DIL: CPT

## 2017-07-13 PROCEDURE — 87088 URINE BACTERIA CULTURE: CPT

## 2017-07-13 PROCEDURE — 87086 URINE CULTURE/COLONY COUNT: CPT

## 2017-07-13 RX ORDER — SULFAMETHOXAZOLE AND TRIMETHOPRIM 800; 160 MG/1; MG/1
1 TABLET ORAL 2 TIMES DAILY
Qty: 6 TAB | Refills: 0 | Status: SHIPPED | OUTPATIENT
Start: 2017-07-13 | End: 2017-07-17

## 2017-07-13 NOTE — MR AVS SNAPSHOT
Visit Information Date & Time Provider Department Dept. Phone Encounter #  
 7/13/2017 10:40 AM [facility] [phone] Your Appointments 8/4/2017 10:10 AM  
ROUTINE CARE with MD [facility]) Appt Note: Per Dr. Jacqueline Jain [address] [place] [address]  
[address] [facility] [address]  
  
    
 8/15/2017 10:00 AM  
ROUTINE CARE with MD Mckin[facility]) Appt Note: 4mF/U AI and DM.  
 [address] , [address] [place] [address]  
[address], [address] [phone] Upcoming Health Maintenance Date Due ZOSTER VACCINE AGE 60> 6/29/2000 MICROALBUMIN Q1 2/11/2017 EYE EXAM RETINAL OR DILATED Q1 7/28/2017* INFLUENZA AGE 9 TO ADULT 8/1/2017 LIPID PANEL Q1 10/13/2017 HEMOGLOBIN A1C Q6M 11/19/2017 FOOT EXAM Q1 12/19/2017 MEDICARE YEARLY EXAM 4/19/2018 GLAUCOMA SCREENING Q2Y 10/12/2018 DTaP/Tdap/Td series (2 - Td) 3/30/2020 *Topic was postponed. The date shown is not the original due date. Allergies as of 7/13/2017  Review Complete On: 7/13/2017 By: Christiano Sommers NP Severity Noted Reaction Type Reactions Amoxicillin  10/13/2016    Unknown (comments) Erythromycin  04/22/2011    Rash, Unknown (comments) fever Current Immunizations  Reviewed on 4/2/2016 Name Date DTaP 3/30/2010 Influenza Vaccine 10/30/2015 Influenza Vaccine Whole 10/15/2010 Pneumococcal Vaccine (Unspecified Type) 10/15/2009 Not reviewed this visit You Were Diagnosed With   
  
 Codes Comments Painful urination    -  Primary ICD-10-CM: R30.9 ICD-9-CM: 788.1 Urinary tract infection with hematuria, site unspecified     ICD-10-CM: N39.0, R31.9 ICD-9-CM: 599.0 Vitals BP Pulse Temp Resp Height(growth percentile) Weight(growth percentile) 130/72 (BP 1 Location: Left arm, BP Patient Position: Sitting) 78 98.5 °F (36.9 °C) 17 5' 2\" (1.575 m) 178 lb 6.4 oz (80.9 kg) SpO2 BMI OB Status Smoking Status 98% 32.63 kg/m2 Postmenopausal Never Smoker Vitals History BMI and BSA Data Body Mass Index Body Surface Area  
 32.63 kg/m 2 1.88 m 2 Preferred Pharmacy Pharmacy Name [facility] [phone] Your Updated Medication List  
  
   
This list is accurate as of: 7/13/17 12:15 PM.  Always use your most recent med list. amLODIPine 5 mg tablet Commonly known as:  [place] TAKE 1 TABLET BY MOUTH DAILY. ascorbic acid (vitamin C) 500 mg tablet Commonly known as:  VITAMIN C Take 1 Tab by mouth daily. * aspirin 81 mg chewable tablet Take 1 Tab by mouth daily. * [facility] ASPIRIN 81 mg tablet Generic drug:  aspirin delayed-release  
  
 atorvastatin 80 mg tablet Commonly known as:  LIPITOR Take 1 Tab by mouth nightly. cefUROXime 250 mg tablet Commonly known as:  CEFTIN  
  
 colestipol 1 gram tablet Commonly known as:  COLESTID Take 1 g by mouth two (2) times a day. CULTURELLE PROBIOTICS 10 billion cell -200 mg Cpsp Generic drug:  Lactobac. rhamnosus GG-inulin ESTRACE 0.01 % (0.1 mg/gram) vaginal cream  
Generic drug:  estradiol  
  
 fludrocortisone 0.1 mg tablet Commonly known as:  FLORINEF Take 1 Tab by mouth daily. FLUoxetine 20 mg tablet Commonly known as:  PROzac Take 1 Tab by mouth nightly. furosemide 40 mg tablet Commonly known as:  LASIX Take 1 Tab by mouth daily. gabapentin 300 mg capsule Commonly known as:  NEURONTIN Take 1 Cap by mouth nightly. glucose blood VI test strips strip Commonly known as:  PRODIGY NO CODING  
 Patient test glucose 3 times daily. ICD E11.9  
  
 hydrocortisone 5 mg tablet Commonly known as:  CORTEF  
15mg Qam, 5mg Qpm  
  
 insulin glargine 300 unit/mL (1.5 mL) Inpn Commonly known as:  TOUJEO SOLOSTAR  
60 Units by SubCUTAneous route nightly. levothyroxine 100 mcg tablet Commonly known as:  SYNTHROID Take 1 Tab by mouth Daily (before breakfast). Liraglutide 0.6 mg/0.1 mL (18 mg/3 mL) sub-q pen Commonly known as:  VICTOZA  
1.8mg daily  
  
 lisinopril 40 mg tablet Commonly known as:  Evan Sheri Take 1 Tab by mouth every morning. meloxicam 15 mg tablet Commonly known as:  MOBIC Take 15 mg by mouth daily. metFORMIN  mg tablet Commonly known as:  GLUCOPHAGE XR Take 1 Tab by mouth daily (with dinner). oxybutynin 5 mg tablet Commonly known as:  DITROPAN  
  
 pantoprazole 40 mg tablet Commonly known as:  PROTONIX Take 1 Tab by mouth Daily (before breakfast). Indications: GASTROESOPHAGEAL REFLUX  
  
 trimethoprim-sulfamethoxazole 160-800 mg per tablet Commonly known as:  BACTRIM DS, SEPTRA DS Take 1 Tab by mouth two (2) times a day for 3 days. trospium 20 mg tablet Commonly known as:  [place] TAKE 1 TABLET BY MOUTH TWICE A DAY. * Notice: This list has 2 medication(s) that are the same as other medications prescribed for you. Read the directions carefully, and ask your doctor or other care provider to review them with you. Prescriptions Sent to Pharmacy Refills  
 trimethoprim-sulfamethoxazole (BACTRIM DS, SEPTRA DS) 160-800 mg per tablet 0 Sig: Take 1 Tab by mouth two (2) times a day for 3 days. Class: Normal  
 Pharmacy: [address] [place], [place] [address]and Dr. Ph #: [phone] Route: Oral  
  
We Performed the Following AMB POC URINALYSIS DIP STICK AUTO W/O MICRO [65515 CPT(R)] CULTURE, URINE [id number] CPT(R)] Introducing [facility]!    
 Dear Saul Alvarez: 
 Thank you for requesting a [email or web address] account. Our records indicate that you already have an active [email or web address] account. You can access your account anytime at [email or web address] [email or web address] Did you know that you can access your hospital and ER discharge instructions at any time in [email or web address]? You can also review all of your test results from your hospital stay or ER visit. Additional Information If you have questions, please visit the Frequently Asked Questions section of the [email or web address] website at [email or web address] Remember, [email or web address] is NOT to be used for urgent needs. For medical emergencies, dial 911. Now available from your iPhone and Android! Please provide this summary of care documentation to your next provider. Your primary care clinician is listed as Richard Herrera. If you have any questions after today's visit, please call [phone].

## 2017-07-13 NOTE — PROGRESS NOTES
69 yo female with 24 hrs of urinary burning and frequency. She has had some chills. She has fairly frequent UTIs - last 3 mos ago. She will be seeing her Urologist next week. She was given a cream to use on the perineal area, but she admits to not using it regularly. PE: WNWD Elderly WF accompanied by her caregiver   T - 98.5  Urinalysis: 3+ leuks & nitrite +     Imp: UTI    Plan: Send urine for culture   Bactrim x 3 days   Increase hydration  _____________________  Expected course of current diagnosed problem(s) as well as expected progression and possible complications, and desired follow up with provider are discussed with patient. Patient is encouraged to be back in touch with any questions or concerns. Patient expresses understanding of plan of care. Patient is given AVS which includes diagnoses, current medications, vitals.

## 2017-07-13 NOTE — PROGRESS NOTES
Chief Complaint   Patient presents with   [address]     pt c/o lower back pain that started yesterday late afternoon.  Urinary Frequency     Pt c/o urinary pain, frequency, & odor that started this morning.      Results for orders placed or performed in visit on 07/13/17   AMB POC URINALYSIS DIP STICK AUTO W/O MICRO   Result Value Ref Range    Color (UA POC) Light Yellow     Clarity (UA POC) Cloudy     Glucose (UA POC) Negative Negative    Bilirubin (UA POC) Negative Negative    Ketones (UA POC) Negative Negative    Specific gravity (UA POC) 1.010 1.001 - 1.035    Blood (UA POC) Trace Negative    pH (UA POC) 7.0 4.6 - 8.0    Protein (UA POC) Negative Negative mg/dL    Urobilinogen (UA POC) 0.2 mg/dL 0.2 - 1    Nitrites (UA POC) Positive Negative    Leukocyte esterase (UA POC) 3+ Negative

## 2017-07-15 LAB
BACTERIA UR CULT: ABNORMAL
BACTERIA UR CULT: ABNORMAL

## 2017-07-17 ENCOUNTER — TELEPHONE (OUTPATIENT)
Dept: INTERNAL MEDICINE CLINIC | Age: 77
End: 2017-07-17

## 2017-07-17 DIAGNOSIS — B96.20 E. COLI URINARY TRACT INFECTION: Primary | ICD-10-CM

## 2017-07-17 DIAGNOSIS — N39.0 E. COLI URINARY TRACT INFECTION: Primary | ICD-10-CM

## 2017-07-17 RX ORDER — NITROFURANTOIN 25; 75 MG/1; MG/1
100 CAPSULE ORAL 2 TIMES DAILY
Qty: 14 CAP | Refills: 0 | Status: SHIPPED | OUTPATIENT
Start: 2017-07-17 | End: 2017-07-24

## 2017-07-28 ENCOUNTER — LAB ONLY (OUTPATIENT)
Dept: INTERNAL MEDICINE CLINIC | Age: 77
End: 2017-07-28

## 2017-07-28 ENCOUNTER — HOSPITAL ENCOUNTER (OUTPATIENT)
Dept: LAB | Age: 77
Discharge: HOME | End: 2017-07-28
Payer: MEDICARE

## 2017-07-28 DIAGNOSIS — R30.9 PAINFUL URINATION: Primary | ICD-10-CM

## 2017-07-28 LAB
BILIRUB UR QL STRIP: NEGATIVE
GLUCOSE UR-MCNC: NEGATIVE MG/DL
KETONES P FAST UR STRIP-MCNC: NEGATIVE MG/DL
PH UR STRIP: 6 [PH] (ref 4.6–8)
PROT UR QL STRIP: NEGATIVE MG/DL
SP GR UR STRIP: 1.01 (ref 1–1.03)
UA UROBILINOGEN AMB POC: NORMAL (ref 0.2–1)
URINALYSIS CLARITY POC: NORMAL
URINALYSIS COLOR POC: YELLOW
URINE BLOOD POC: NORMAL
URINE LEUKOCYTES POC: NORMAL
URINE NITRITES POC: NEGATIVE

## 2017-07-28 PROCEDURE — 87088 URINE BACTERIA CULTURE: CPT

## 2017-07-28 PROCEDURE — 87077 CULTURE AEROBIC IDENTIFY: CPT

## 2017-07-28 PROCEDURE — 87186 SC STD MICRODIL/AGAR DIL: CPT

## 2017-07-28 RX ORDER — CIPROFLOXACIN 250 MG/1
250 TABLET, FILM COATED ORAL 2 TIMES DAILY
Qty: 10 TAB | Refills: 0 | Status: SHIPPED | OUTPATIENT
Start: 2017-07-28 | End: 2017-07-31 | Stop reason: ALTCHOICE

## 2017-07-31 DIAGNOSIS — N39.0 URINARY TRACT INFECTION WITHOUT HEMATURIA, SITE UNSPECIFIED: Primary | ICD-10-CM

## 2017-07-31 LAB
BACTERIA UR CULT: ABNORMAL
BACTERIA UR CULT: ABNORMAL

## 2017-07-31 RX ORDER — NITROFURANTOIN 25; 75 MG/1; MG/1
100 CAPSULE ORAL 2 TIMES DAILY
Qty: 10 CAP | Refills: 0 | Status: SHIPPED | OUTPATIENT
Start: 2017-07-31 | End: 2017-09-20 | Stop reason: ALTCHOICE

## 2017-08-03 NOTE — PROGRESS NOTES
Subjective:     Chief Complaint   Patient presents with    New Patient     She  is a 68y.o. year old female who presents for evaluation as a new patient. New problems        Marlyn Palomino / Kalpana Montano / Michelle Yates / Duration / Timing / Context / Gauri Alfredo / Assoc s/s  1. Sore inside of lower lip (L)  2. DM IIb with CKD on insulin therapy  3. 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 Acquired hypothyroidism  6. Depression  7. RLS   8. GERD    Has blister inside of her lip. Has a history of DM and adrenal insufficiency due to bilateral adrenalectomy. Has recently established with Dr Alvaro Hendrickson. Had a stroke in 3/2016. Affected her eye sight and has right visual loss. Affected her memory as well. Is in independent living at [facility]. Has someone who comes in every day to help with care. Historical Data    Past Medical History   Diagnosis Date    Arthritis osteoporosis    Cataract     Diabetes ([facility] [place] [address]     Endocrine disease      adrenal insufficiency    GERD (gastroesophageal reflux disease)     H/O Cushing disease     Hypertension     Other ill-defined conditions(799.89)      elizabeth's disease    Stroke ([facility] [place] [address]      TIA 2011    Thyroid disease hypothyroid    UTI (lower urinary tract infection)         Past Surgical History   Procedure Laterality Date    Hx hysterectomy      Hx tonsillectomy      Hx cataract removal       ON the left    Hx other surgical       adrenal gland removed 1970    Hx appendectomy      Hx cholecystectomy      Hx cholecystectomy  2000    Pr endocrine surgery proc unlisted       adrenal surgery in 1972        Outpatient Encounter Prescriptions as of 1/16/2017   Medication Sig Dispense Refill    fludrocortisone (FLORINEF) 0.1 mg tablet Take 1 Tab by mouth daily. 90 Tab 3    atorvastatin (LIPITOR) 80 mg tablet Take 1 Tab by mouth nightly.  90 Tab 3    gabapentin (NEURONTIN) 300 mg capsule Take 1 Cap by mouth nightly. 90 Cap 3    meloxicam (MOBIC) 15 mg tablet Take 15 mg by mouth daily.  insulin aspart (NOVOLOG) 100 unit/mL inpn by SubCUTAneous route.  hydrocortisone (CORTEF) 5 mg tablet 15mg Qam, 5mg Qpm 120 Tab 11    SITagliptin (JANUVIA) 50 mg tablet Take 1 Tab by mouth daily. 30 Tab 11    pantoprazole (PROTONIX) 40 mg tablet Take 1 Tab by mouth Daily (before breakfast). Indications: GASTROESOPHAGEAL REFLUX 30 Tab 1    L. acidoph & paracasei- S therm- Bifido (ASUNCION-Q) 8 billion cell cap cap Take 1 Cap by mouth daily. 14 Cap 0    amLODIPine (NORVASC) 5 mg tablet Take 1 Tab by mouth daily. Indications: HYPERTENSION 30 Tab 1    FLUoxetine (PROZAC) 20 mg tablet Take 20 mg by mouth nightly.  aspirin 81 mg chewable tablet Take 81 mg by mouth daily.  furosemide (LASIX) 40 mg tablet Take 40 mg by mouth daily.  lisinopril (PRINIVIL, ZESTRIL) 40 mg tablet Take 40 mg by mouth every morning.  insulin glargine (TOUJEO SOLOSTAR) 300 unit/mL (1.5 mL) inpn 66 Units by SubCUTAneous route nightly.  ascorbic acid (VITAMIN C) 500 mg tablet Take 500 mg by mouth daily.  colestipol (COLESTID) 1 gram tablet Take 1 g by mouth two (2) times a day.  levothyroxine (SYNTHROID) 100 mcg tablet Take 1 Tab by mouth Daily (before breakfast). 30 Tab 11    [DISCONTINUED] HYDROcodone-acetaminophen (NORCO) 5-325 mg per tablet Take 1 Tab by mouth every four (4) hours as needed for Pain. Max Daily Amount: 6 Tabs. 20 Tab 0     No facility-administered encounter medications on file as of 1/16/2017. Allergies   Allergen Reactions    Amoxicillin Unknown (comments)    Erythromycin Rash and Unknown (comments)     fever        Social History     Social History    Marital status:      Spouse name: N/A    Number of children: N/A    Years of education: N/A     Occupational History    Not on file.      Social History Main Topics    Smoking status: Never Smoker    Smokeless tobacco: Never Used    Alcohol use No    Drug use: No    Sexual activity: Not Currently     Other Topics Concern    Not on file     Social History Narrative        Review of Systems  A comprehensive review of systems was negative except for: Ears, nose, mouth, throat, and face: positive for sore on lower (L) lip    Objective:     Vitals:    01/16/17 1102   BP: 110/80   Pulse: 66   Resp: 16   Temp: 97.7 °F (36.5 °C)   TempSrc: Oral   SpO2: 97%   Weight: 193 lb 14.4 oz (88 kg)   Height: 5' 3\" (1.6 m)     Pleasant WF in no acute distress. Ears: In canal hearing aids noted. Eyes:  Right homonymous hemianopsia noted. Throat:  Soft tissue growth on inside of lower lip (appears non-malignant)  Neck: No masses. Cardiac:  RRR without murmurs, gallops or rubs. Lungs: Clear to auscultation. Neuro: No focal deficits. Foot exam:  Recently done by Endocrinologist    ASSESSMENT / PLAN:   1. Controlled type 2 diabetes mellitus with stage 3 chronic kidney disease, with long-term current use of insulin ([facility] [address]  · Seeing Endocrinologist.  · Continue dietary efforts. · On statin and ACEI. · Encouraged to get regular eye exams. · Examine feet carefully morning and night. · Continue insulin therapy and Januvia  - atorvastatin (LIPITOR) 80 mg tablet; Take 1 Tab by mouth nightly. Dispense: 90 Tab; Refill: 3  - gabapentin (NEURONTIN) 300 mg capsule; Take 1 Cap by mouth nightly. Dispense: 90 Cap; Refill: 3  - insulin aspart (NOVOLOG) 100 unit/mL inpn; Take 16 units with every meal or as directed. Dispense: 3 Pen; Refill: 10  - SITagliptin (JANUVIA) 50 mg tablet; Take 1 Tab by mouth daily. Dispense: 30 Tab; Refill: 11  - insulin glargine (TOUJEO SOLOSTAR) 300 unit/mL (1.5 mL) inpn; 66 Units by SubCUTAneous route nightly. 60 units  Dispense: 3 Adjustable Dose Pre-filled Pen Syringe; Refill: 11    2. Cushing disease ([facility] [address]  · S/P XRT to pituitary and bilateral adrenalectomy    3.  Adrenal insufficiency ([facility] [address]  · On cortisone and fludrocortisone (presumably)    4. Essential hypertension, benign  · Continue lisinopril. · Encouraged to not take meloxicam unless absolutely necessary. 5. 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 - ascorbic acid, vitamin C, (VITAMIN C) 500 mg tablet; Take 1 Tab by mouth daily. Dispense: 100 Tab; Refill: 3  - aspirin 81 mg chewable tablet; Take 1 Tab by mouth daily. Dispense: 100 Tab; Refill: 3    6. Elevated cholesterol  · Continue statin. · Mediterranean diet. 7. Depression, unspecified depression type    - FLUoxetine (PROZAC) 20 mg tablet; Take 1 Tab by mouth nightly. Dispense: 90 Tab; Refill: 3    8. RLS (restless legs syndrome)  · On gabapentin    9. Gastroesophageal reflux disease, esophagitis presence not specified  · Off of PPI which we should continue to avoid if possible    10. Hypothyroidism due to acquired atrophy of thyroid  · On LT4 100 mcg daily currently    11. Encounter for immunization  · Had pneumonia shot in 2009 which would have been -23. · Provide Prevnar. - pneumococcal 13 aashish conj dip (PREVNAR-13) 0.5 mL syrg injection; 0.5 mL by IntraMUSCular route once for 1 dose. Dispense: 0.5 mL; Refill: 0    12. Lip sore  · Likely related to trauma. · Refer is persists. Follow diabetic and adrenal replacement instructions as per Dr Nano Spencer. Have a Prevnar vaccine at your pharmacy. Continue your current medications. Get regular eye exams. Check your feet morning and night. Follow-up Disposition:  Return in about 3 months (around 4/16/2017) for F/U DM, ETc. Advised her to call back or return to office if symptoms worsen/change/persist.  Discussed expected course/resolution/complications of diagnosis in detail with patient. Medication risks/benefits/costs/interactions/alternatives discussed with patient.   She was given an after visit summary which includes diagnoses, current medications, & vitals. She expressed understanding with the diagnosis and plan. PACU

## 2017-08-04 ENCOUNTER — OFFICE VISIT (OUTPATIENT)
Dept: ENDOCRINOLOGY | Age: 77
End: 2017-08-04

## 2017-08-04 VITALS
WEIGHT: 177.2 LBS | SYSTOLIC BLOOD PRESSURE: 128 MMHG | DIASTOLIC BLOOD PRESSURE: 84 MMHG | BODY MASS INDEX: 32.61 KG/M2 | OXYGEN SATURATION: 98 % | HEIGHT: 62 IN | HEART RATE: 70 BPM | RESPIRATION RATE: 16 BRPM

## 2017-08-04 DIAGNOSIS — E11.22 CONTROLLED TYPE 2 DIABETES MELLITUS WITH STAGE 3 CHRONIC KIDNEY DISEASE, WITH LONG-TERM CURRENT USE OF INSULIN (HCC): ICD-10-CM

## 2017-08-04 DIAGNOSIS — Z79.4 CONTROLLED TYPE 2 DIABETES MELLITUS WITH STAGE 3 CHRONIC KIDNEY DISEASE, WITH LONG-TERM CURRENT USE OF INSULIN (HCC): ICD-10-CM

## 2017-08-04 DIAGNOSIS — E27.40 ADRENAL INSUFFICIENCY (HCC): Primary | Chronic | ICD-10-CM

## 2017-08-04 DIAGNOSIS — N18.30 CONTROLLED TYPE 2 DIABETES MELLITUS WITH STAGE 3 CHRONIC KIDNEY DISEASE, WITH LONG-TERM CURRENT USE OF INSULIN (HCC): ICD-10-CM

## 2017-08-04 DIAGNOSIS — E03.4 HYPOTHYROIDISM DUE TO ACQUIRED ATROPHY OF THYROID: Chronic | ICD-10-CM

## 2017-08-04 RX ORDER — SULFAMETHOXAZOLE AND TRIMETHOPRIM 800; 160 MG/1; MG/1
TABLET ORAL
Refills: 0 | COMMUNITY
Start: 2017-07-13 | End: 2017-09-20 | Stop reason: ALTCHOICE

## 2017-08-04 RX ORDER — METFORMIN HYDROCHLORIDE 500 MG/1
500 TABLET, EXTENDED RELEASE ORAL 2 TIMES DAILY WITH MEALS
Qty: 180 TAB | Refills: 3 | Status: SHIPPED | OUTPATIENT
Start: 2017-08-04 | End: 2018-04-23 | Stop reason: SDUPTHER

## 2017-08-04 RX ORDER — FLUOXETINE HYDROCHLORIDE 20 MG/1
CAPSULE ORAL
Refills: 3 | COMMUNITY
Start: 2017-07-19 | End: 2017-08-10 | Stop reason: SDUPTHER

## 2017-08-04 NOTE — MR AVS SNAPSHOT
Visit Information Date & Time Provider Department Dept. Phone Encounter #  
 8/4/2017 10:10 AM Margi Kirkpatrick MD [facility] [id number] Your Appointments 8/10/2017  9:45 AM  
ACUTE CARE with MD Mckinley [facility]) Appt Note: uri f/up [address], [address] [facility] [address]  
[address], [address] [address]  
  
    
 8/15/2017 10:00 AM  
ROUTINE CARE with MD Mckinley Palencia [facility]) Appt Note: 4mF/U AI and DM.  
 [address], [address]  
[address], [phone] Upcoming Health Maintenance Date Due  
 EYE EXAM RETINAL OR DILATED Q1 6/29/1950 ZOSTER VACCINE AGE 60> 4/29/2000 MICROALBUMIN Q1 2/11/2017 INFLUENZA AGE 9 TO ADULT 8/1/2017 LIPID PANEL Q1 10/13/2017 HEMOGLOBIN A1C Q6M 11/19/2017 FOOT EXAM Q1 12/19/2017 MEDICARE YEARLY EXAM 4/19/2018 GLAUCOMA SCREENING Q2Y 10/12/2018 DTaP/Tdap/Td series (2 - Td) 3/30/2020 Allergies as of 8/4/2017  Review Complete On: 8/4/2017 By: Radha Recinos Severity Noted Reaction Type Reactions Amoxicillin  10/13/2016    Unknown (comments) Erythromycin  04/22/2011    Rash, Unknown (comments) fever Current Immunizations  Reviewed on 4/2/2016 Name Date DTaP 3/30/2010 Influenza Vaccine 10/30/2015 Influenza Vaccine Whole 10/15/2010 ZZZ-RETIRED (DO NOT USE) Pneumococcal Vaccine (Unspecified Type) 10/15/2009 Not reviewed this visit You Were Diagnosed With   
  
 Codes Comments Controlled type 2 diabetes mellitus with stage 3 chronic kidney disease, with long-term current use of insulin (HCC)     ICD-10-CM: E11.22, N18.3, Z79.4 ICD-9-CM: 250.40, 585.3, V58.67 Vitals BP Pulse Resp Height(growth percentile) Weight(growth percentile) SpO2  
 128/84 (BP 1 Location: Left arm, BP Patient Position: Sitting) 70 16 5' 2\" (1.575 m) 177 lb 3.2 oz (80.4 kg) 98% BMI OB Status Smoking Status 32.41 kg/m2 Postmenopausal Never Smoker Vitals History BMI and BSA Data Body Mass Index Body Surface Area  
 32.41 kg/m 2 1.88 m 2 Preferred Pharmacy Pharmacy Name [facility]. [phone] Your Updated Medication List  
  
   
This list is accurate as of: 8/4/17 11:12 AM.  Always use your most recent med list. amLODIPine 5 mg tablet Commonly known as:  Freddie Son TAKE 1 TABLET BY MOUTH DAILY. ascorbic acid (vitamin C) 500 mg tablet Commonly known as:  VITAMIN C Take 1 Tab by mouth daily. * aspirin 81 mg chewable tablet Take 1 Tab by mouth daily. * [facility] ASPIRIN 81 mg tablet Generic drug:  aspirin delayed-release  
  
 atorvastatin 80 mg tablet Commonly known as:  LIPITOR Take 1 Tab by mouth nightly. cefUROXime 250 mg tablet Commonly known as:  CEFTIN  
  
 colestipol 1 gram tablet Commonly known as:  COLESTID Take 1 g by mouth two (2) times a day. CULTURELLE PROBIOTICS 10 billion cell -200 mg Cpsp Generic drug:  Lactobac. rhamnosus GG-inulin ESTRACE 0.01 % (0.1 mg/gram) vaginal cream  
Generic drug:  estradiol  
  
 fludrocortisone 0.1 mg tablet Commonly known as:  FLORINEF Take 1 Tab by mouth daily. * FLUoxetine 20 mg tablet Commonly known as:  PROzac Take 1 Tab by mouth nightly. * FLUoxetine 20 mg capsule Commonly known as:  PROzac TAKE 1 CAPSULE BY MOUTH NIGHTLY. furosemide 40 mg tablet Commonly known as:  LASIX Take 1 Tab by mouth daily. gabapentin 300 mg capsule Commonly known as:  NEURONTIN Take 1 Cap by mouth nightly. glucose blood VI test strips strip Commonly known as:  PRODIGY NO CODING Patient test glucose 3 times daily. ICD E11.9  
  
 hydrocortisone 5 mg tablet Commonly known as:  CORTEF  
15mg Qam, 5mg Qpm  
  
 insulin glargine 300 unit/mL (1.5 mL) Inpn Commonly known as:  TOUJEO SOLOSTAR  
50 Units by SubCUTAneous route nightly. levothyroxine 100 mcg tablet Commonly known as:  SYNTHROID Take 1 Tab by mouth Daily (before breakfast). Liraglutide 0.6 mg/0.1 mL (18 mg/3 mL) sub-q pen Commonly known as:  VICTOZA  
1.8mg daily  
  
 lisinopril 40 mg tablet Commonly known as:  Tildon Rosana Take 1 Tab by mouth every morning. meloxicam 15 mg tablet Commonly known as:  MOBIC Take 15 mg by mouth daily. metFORMIN  mg tablet Commonly known as:  GLUCOPHAGE XR Take 1 Tab by mouth two (2) times daily (with meals). nitrofurantoin (macrocrystal-monohydrate) 100 mg capsule Commonly known as:  MACROBID Take 1 Cap by mouth two (2) times a day. Indications: BACTERIAL URINARY TRACT INFECTION  
  
 oxybutynin 5 mg tablet Commonly known as:  DITROPAN  
  
 pantoprazole 40 mg tablet Commonly known as:  PROTONIX Take 1 Tab by mouth Daily (before breakfast). Indications: GASTROESOPHAGEAL REFLUX  
  
 trimethoprim-sulfamethoxazole 160-800 mg per tablet Commonly known as:  BACTRIM DS, SEPTRA DS  
TAKE 1 TABLET BY MOUTH TWO (2) TIMES A DAY FOR 3 DAYS. trospium 20 mg tablet Commonly known as:  Melendez Oakridge TAKE 1 TABLET BY MOUTH TWICE A DAY. * Notice: This list has 4 medication(s) that are the same as other medications prescribed for you. Read the directions carefully, and ask your doctor or other care provider to review them with you. Prescriptions Sent to Pharmacy Refills  
 insulin glargine (TOUJEO SOLOSTAR) 300 unit/mL (1.5 mL) inpn 3 Si Units by SubCUTAneous route nightly. Class: Normal  
 Pharmacy: [address] [place] [address]  Ph #: [phone] Route: SubCUTAneous  
 metFORMIN ER (GLUCOPHAGE XR) 500 mg tablet 3 Sig: Take 1 Tab by mouth two (2) times daily (with meals). Class: Normal  
 Pharmacy: [address] Ph #: [phone] Route: Oral  
 Liraglutide (VICTOZA) 0.6 mg/0.1 mL (18 mg/3 mL) sub-q pen 5 Si.8mg daily Class: Normal  
 Pharmacy: [address] Ph #: [phone] We Performed the Following HEMOGLOBIN A1C WITH EAG [94369 CPT(R)] METABOLIC PANEL, COMPREHENSIVE [18909 CPT(R)] TSH AND FREE T4 [38045 CPT(R)] Patient Instructions Diabetes Instructions: 
- decrease Toujeo to 50 units once a day 
- increase metformin to 500mg twice a day (with breakfast and dinner) 
- continue Victoza 1.8mg daily Introducing [address]! Dear Makayla Hendrickson: Thank you for requesting a [employer] account. Our records indicate that you already have an active [employer] account. You can access your account anytime at [email or web address] Did you know that you can access your hospital and ER discharge instructions at any time in [employer]? You can also review all of your test results from your hospital stay or ER visit. Additional Information If you have questions, please visit the Frequently Asked Questions section of the [employer] website at [email or web address] Remember, [employer] is NOT to be used for urgent needs. For medical emergencies, dial 911. Now available from your iPhone and Android! Please provide this summary of care documentation to your next provider. Your primary care clinician is listed as Loreta Balderas. If you have any questions after today's visit, please call [phone].

## 2017-08-04 NOTE — LETTER
8/4/2017 11:08 AM 
 
Ms. James Mcmanus [address] [place] [address] To Whom It May Concern: 
 
James Mcmanus is a patient of mine with type 2 diabetes and adrenal insuffiencey. She requires daily medications for treatment which include the list below. Some of these are injectable medications which require pen needles. She also checks her blood sugar so requires testing supplies (lancets, strips, and a glucometer). Please allow her to bring these things with her when she travels and don't hesitate to contact my office if you have questions. Sincerely, Latosha Kruger MD  
 
 
 
 
Current Outpatient Prescriptions:  
  FLUoxetine (PROZAC) 20 mg capsule, TAKE 1 CAPSULE BY MOUTH NIGHTLY., Disp: , Rfl: 3 
  trimethoprim-sulfamethoxazole (BACTRIM DS, SEPTRA DS) 160-800 mg per tablet, TAKE 1 TABLET BY MOUTH TWO (2) TIMES A DAY FOR 3 DAYS., Disp: , Rfl: 0 
  insulin glargine (TOUJEO SOLOSTAR) 300 unit/mL (1.5 mL) inpn, 50 Units by SubCUTAneous route nightly., Disp: 9 Adjustable Dose Pre-filled Pen Syringe, Rfl: 3 
  metFORMIN ER (GLUCOPHAGE XR) 500 mg tablet, Take 1 Tab by mouth two (2) times daily (with meals). , Disp: 180 Tab, Rfl: 3 
  Liraglutide (VICTOZA) 0.6 mg/0.1 mL (18 mg/3 mL) sub-q pen, 1.8mg daily, Disp: 9 mL, Rfl: 5 
  nitrofurantoin, macrocrystal-monohydrate, (MACROBID) 100 mg capsule, Take 1 Cap by mouth two (2) times a day. Indications: BACTERIAL URINARY TRACT INFECTION, Disp: 10 Cap, Rfl: 0 
  amLODIPine (NORVASC) 5 mg tablet, TAKE 1 TABLET BY MOUTH DAILY. , Disp: 30 Tab, Rfl: 3 
  furosemide (LASIX) 40 mg tablet, Take 1 Tab by mouth daily. , Disp: 90 Tab, Rfl: 3 
  trospium (SANCTURA) 20 mg tablet, TAKE 1 TABLET BY MOUTH TWICE A DAY., Disp: , Rfl: 3 
  CULTURELLE PROBIOTICS 10 billion cell -200 mg cpSP, , Disp: , Rfl: 99 
  cefUROXime (CEFTIN) 250 mg tablet, , Disp: , Rfl: 0 
  [facility] ASPIRIN 81 mg tablet, , Disp: , Rfl: 99 
   ESTRACE 0.01 % (0.1 mg/gram) vaginal cream, , Disp: , Rfl: 3 
  oxybutynin (DITROPAN) 5 mg tablet, , Disp: , Rfl: 2 
  levothyroxine (SYNTHROID) 100 mcg tablet, Take 1 Tab by mouth Daily (before breakfast). , Disp: 30 Tab, Rfl: 11 
  glucose blood VI test strips (PRODIGY NO CODING) strip, Patient test glucose 3 times daily. ICD E11.9, Disp: 150 Strip, Rfl: 1 
  lisinopril (PRINIVIL, ZESTRIL) 40 mg tablet, Take 1 Tab by mouth every morning., Disp: 90 Tab, Rfl: 3 
  ascorbic acid, vitamin C, (VITAMIN C) 500 mg tablet, Take 1 Tab by mouth daily. , Disp: 100 Tab, Rfl: 3 
  aspirin 81 mg chewable tablet, Take 1 Tab by mouth daily. , Disp: 100 Tab, Rfl: 3 
  atorvastatin (LIPITOR) 80 mg tablet, Take 1 Tab by mouth nightly. (Patient taking differently: Take 40 mg by mouth nightly.), Disp: 90 Tab, Rfl: 3 
  FLUoxetine (PROZAC) 20 mg tablet, Take 1 Tab by mouth nightly., Disp: 90 Tab, Rfl: 3 
  gabapentin (NEURONTIN) 300 mg capsule, Take 1 Cap by mouth nightly., Disp: 90 Cap, Rfl: 3 
  fludrocortisone (FLORINEF) 0.1 mg tablet, Take 1 Tab by mouth daily. , Disp: 90 Tab, Rfl: 3 
  meloxicam (MOBIC) 15 mg tablet, Take 15 mg by mouth daily. , Disp: , Rfl:  
  hydrocortisone (CORTEF) 5 mg tablet, 15mg Qam, 5mg Qpm, Disp: 120 Tab, Rfl: 11 
  pantoprazole (PROTONIX) 40 mg tablet, Take 1 Tab by mouth Daily (before breakfast). Indications: GASTROESOPHAGEAL REFLUX, Disp: 30 Tab, Rfl: 1   colestipol (COLESTID) 1 gram tablet, Take 1 g by mouth two (2) times a day., Disp: , Rfl:

## 2017-08-04 NOTE — PROGRESS NOTES
Brissa Barragan is a 68 y.o. female    Chief Complaint   Patient presents with    Diabetes     Follow-up

## 2017-08-04 NOTE — PROGRESS NOTES
Endocrinology Visit    Chief Complaint: Type 2 diabetes, adrenal insufficiency, hypothyroidism    History of Present Illness: Nichole Zeng is a 68 y.o. female who returns for f/u of adrenal insufficiency, hypothyroidism, and type 2 diabetes mellitus. Records from her previous endocrinologist Dr Amparo Flanagan indicate pt had pituitary Cushings - treatment of which involved XRT (presumably to her pituitary) and BL adrenalectomy. She subsequently developed primary AI and is on hydrocortisone and fludrocortisone replacement. I saw her in initial consultation in December 2016 at which time I made several adjustments to her medication regimen as detailed by problem below. At her last visit in May, we decided to try switching Novolog to Victoza + metformin to help improve her blood sugar control (A1c was 9.6%) and weight. She has lost 15 lbs since her last visit and reports blood sugars are much better controlled. Denies any GI side effects from the medication. Regarding type 2 diabetes, current glycemic medication regimen is Victoza 1.8mg daily, metformin XR 500mg daily, and Toujeo 60 units Qam. She has a caretaker who helps with her medication administration. Home blood glucose monitoring frequency: 2-3 times/day  Glucose range at home: 42-upper 200s but most values are in the 100s. The 42 was before breakfast today - this was after drinking orange juice. This is the only BG<70 that she has had. She does not feel well when BG is in the 80s or lower. Known complications include CKD. Last eye exam was over a year ago. She does exercise, walks every day. Her food is provided at her [facility]. She does try to restrict dietary carbohydrate intake somewhat but does eat dessert on occasion. Eats 3 meals/day and does snack on occasion. She also has a history of hypothyroidism and is currently on generic levothyroxine 100 mcg daily. This was reduced from previous dose of 112 mcg daily in December after her TSH returned too low. She was on 125 mcg before that but TSH was low in May so Dr Roxana Aiken decreased her dose to 112 mcg daily. 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 She takes her medication first thing in the morning on an empty stomach and waits at least 30 minutes before eating or taking her other meds. She does umana insomnia (no trouble falling asleep, just wakes up around midnight every night). Denies palpitations, tremors, changes in bowel habits or energy level. Regarding her AI, she currently takes HC 15mg Qam, 5mg Qpm. I reduced her morning dose from 20mg to 15mg. She is also taking fludrocortisone 0.1 mg daily.  At her last visit, her sodium, potassium, and renin levels were normal.     Review of Systems   General ROS: positive for  - weight loss  Ophthalmic ROS: negative  ENT ROS: negative  Endocrine ROS: negative  Respiratory ROS: no cough, shortness of breath, or wheezing  Cardiovascular ROS: no chest pain or dyspnea on exertion  Gastrointestinal ROS: no abdominal pain, change in bowel habits, or N/V/D  Genito-Urinary ROS: positive for - recurrent UTIs  Musculoskeletal ROS: positive for - joint pain and joint stiffness  Neurological ROS: positive for - numbness/tingling, memory loss  Dermatological ROS: negative    Problem List:  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    Depression F32.9    Elevated cholesterol E78.00    Vitamin D deficiency E55.9    Adrenal insufficiency (HCC) E27.40    Hypothyroidism E03.9    Intracranial vascular stenosis I67.9    Meningioma (HCC) D32.9    Cerebral infarction due to stenosis of left posterior cerebral artery ([address] N84.216    Unstable angina ([address] I20.0    Controlled type 2 diabetes mellitus with stage 3 chronic kidney disease, with long-term current use of insulin ([facility]) E11.22, N18.3, Z79.4       Past Medical History:    Past Medical History:   Diagnosis Date    Arthritis osteoporosis    Cataract     Diabetes [facility]     Endocrine disease     adrenal insufficiency    GERD (gastroesophageal reflux disease)     H/O Cushing disease     Hypertension     Other ill-defined conditions     elizabeth's disease    Stroke ([facility]     TIA 2011    Thyroid disease hypothyroid    UTI (lower urinary tract infection)        Past Surgical History:  Past Surgical History:   Procedure Laterality Date    ENDOCRINE SURGERY PROC UNLISTED      adrenal surgery in 1972    HX APPENDECTOMY      HX CATARACT REMOVAL      ON the left    HX CHOLECYSTECTOMY      HX CHOLECYSTECTOMY  2000    HX HYSTERECTOMY      HX OTHER SURGICAL      adrenal gland removed 1970    HX TONSILLECTOMY         Social History:  Social History     Social History    Marital status:      Spouse name: N/A    Number of children: N/A    Years of education: N/A     Occupational History    Not on file.      Social History Main Topics    Smoking status: Never Smoker    Smokeless tobacco: Never Used    Alcohol use No    Drug use: No    Sexual activity: Not Currently     Other Topics Concern    Not on file     Social History Narrative       Family History:  Family History   Problem Relation Age of Onset    Cancer Mother     Diabetes Mother     Stroke Mother     Psychiatric Disorder Mother     Dementia Mother     Headache Mother     Heart Disease Father     Psychiatric Disorder Father     Stroke Father     Asthma Sister     Diabetes Sister     Asthma Brother     Heart Disease Brother        Medications:     Current Outpatient Prescriptions:     FLUoxetine (PROZAC) 20 mg capsule, TAKE 1 CAPSULE BY MOUTH NIGHTLY., Disp: , Rfl: 3    trimethoprim-sulfamethoxazole (BACTRIM DS, SEPTRA DS) 160-323 mg per tablet, TAKE 1 TABLET BY MOUTH TWO (2) TIMES A DAY FOR 3 DAYS., Disp: , Rfl: 0    nitrofurantoin, macrocrystal-monohydrate, (MACROBID) 100 mg capsule, Take 1 Cap by mouth two (2) times a day. Indications: BACTERIAL URINARY TRACT INFECTION, Disp: 10 Cap, Rfl: 0    amLODIPine (NORVASC) 5 mg tablet, TAKE 1 TABLET BY MOUTH DAILY. , Disp: 30 Tab, Rfl: 3    furosemide (LASIX) 40 mg tablet, Take 1 Tab by mouth daily. , Disp: 90 Tab, Rfl: 3    trospium (SANCTURA) 20 mg tablet, TAKE 1 TABLET BY MOUTH TWICE A DAY., Disp: , Rfl: 3    CULTURELLE PROBIOTICS 10 billion cell -200 mg cpSP, , Disp: , Rfl: 99    cefUROXime (CEFTIN) 250 mg tablet, , Disp: , Rfl: 0    [facility] ARLIN ASPIRIN 81 mg tablet, , Disp: , Rfl: 99    ESTRACE 0.01 % (0.1 mg/gram) vaginal cream, , Disp: , Rfl: 3    oxybutynin (DITROPAN) 5 mg tablet, , Disp: , Rfl: 2    Liraglutide (VICTOZA) 0.6 mg/0.1 mL (18 mg/3 mL) sub-q pen, 1.8mg daily, Disp: 9 mL, Rfl: 3    levothyroxine (SYNTHROID) 100 mcg tablet, Take 1 Tab by mouth Daily (before breakfast). , Disp: 30 Tab, Rfl: 11    metFORMIN ER (GLUCOPHAGE XR) 500 mg tablet, Take 1 Tab by mouth daily (with dinner). , Disp: 30 Tab, Rfl: 11    glucose blood VI test strips (PRODIGY NO CODING) strip, Patient test glucose 3 times daily. ICD E11.9, Disp: 150 Strip, Rfl: 1    insulin glargine (TOUJEO SOLOSTAR) 300 unit/mL (1.5 mL) inpn, 60 Units by SubCUTAneous route nightly., Disp: 9 Adjustable Dose Pre-filled Pen Syringe, Rfl: 3    lisinopril (PRINIVIL, ZESTRIL) 40 mg tablet, Take 1 Tab by mouth every morning., Disp: 90 Tab, Rfl: 3    ascorbic acid, vitamin C, (VITAMIN C) 500 mg tablet, Take 1 Tab by mouth daily. , Disp: 100 Tab, Rfl: 3    aspirin 81 mg chewable tablet, Take 1 Tab by mouth daily. , Disp: 100 Tab, Rfl: 3    atorvastatin (LIPITOR) 80 mg tablet, Take 1 Tab by mouth nightly.  (Patient taking differently: Take 40 mg by mouth nightly.), Disp: 90 Tab, Rfl: 3    FLUoxetine (PROZAC) 20 mg tablet, Take 1 Tab by mouth nightly., Disp: 90 Tab, Rfl: 3    gabapentin (NEURONTIN) 300 mg capsule, Take 1 Cap by mouth nightly., Disp: 90 Cap, Rfl: 3    fludrocortisone (FLORINEF) 0.1 mg tablet, Take 1 Tab by mouth daily. , Disp: 90 Tab, Rfl: 3    meloxicam (MOBIC) 15 mg tablet, Take 15 mg by mouth daily. , Disp: , Rfl:     hydrocortisone (CORTEF) 5 mg tablet, 15mg Qam, 5mg Qpm, Disp: 120 Tab, Rfl: 11    pantoprazole (PROTONIX) 40 mg tablet, Take 1 Tab by mouth Daily (before breakfast). Indications: GASTROESOPHAGEAL REFLUX, Disp: 30 Tab, Rfl: 1    colestipol (COLESTID) 1 gram tablet, Take 1 g by mouth two (2) times a day., Disp: , Rfl:     Allergies: Allergies   Allergen Reactions    Amoxicillin Unknown (comments)    Erythromycin Rash and Unknown (comments)     fever       Physical Examination:  Blood pressure 128/84, pulse 70, resp. rate 16, height 5' 2\" (1.575 m), weight 177 lb 3.2 oz (80.4 kg), SpO2 98 %.     Gen: elderly female in no acute distress  HEENT: mucous membranes moist  Thyroid: no enlargement or nodules noted  CAD: normal rate, regular rhythm  PULM: unlabored respirations  EXT: no clubbing, cyanosis or edema  Neuro: grossly non focal, reflexes are brisk but no tremor of outstretched hands  Psych: pleasant, fair insight into medical hx  Skin: warm, dry    Clinical Data Review:  Lab Results   Component Value Date/Time    Hemoglobin A1c 9.6 05/19/2017 11:16 AM     Lab Results   Component Value Date/Time    Sodium 140 05/19/2017 11:16 AM    Potassium 4.6 05/19/2017 11:16 AM    Chloride 102 05/19/2017 11:16 AM    CO2 22 05/19/2017 11:16 AM    Anion gap 7 02/03/2017 01:55 PM    Glucose 131 05/19/2017 11:16 AM    BUN 22 05/19/2017 11:16 AM    Creatinine 1.13 05/19/2017 11:16 AM    BUN/Creatinine ratio 19 05/19/2017 11:16 AM    GFR est AA 55 05/19/2017 11:16 AM    GFR est non-AA 47 05/19/2017 11:16 AM    Calcium 9.1 02/03/2017 01:55 PM     No results found for: Jocelyn Rabago, MCA2, MCA3, MCAU, MCAU2, MCALPOCT  Lab Results   Component Value Date/Time    Cholesterol, total 235 10/13/2016 12:13 PM    HDL Cholesterol 39 10/13/2016 12:13 PM    LDL, calculated 116.8 10/13/2016 12:13 PM    VLDL, calculated 79.2 10/13/2016 12:13 PM    Triglyceride 396 10/13/2016 12:13 PM    CHOL/HDL Ratio 6.0 10/13/2016 12:13 PM     Lab Results   Component Value Date/Time    Hemoglobin A1c 9.6 05/19/2017 11:16 AM     Lab Results   Component Value Date/Time    TSH 0.106 05/19/2017 11:16 AM    T4, Free 1.17 05/19/2017 11:16 AM    T4, Total 10.1 04/23/2011 04:07 AM      Assessment and Plan:  Patient is a 68y.o. year old female here for type 2 diabetes, previously suboptimally controlled on multiple daily insulin injection regimen, now much improved after switching mealtime insulin to GLP1a. Recent GFR was increased to 52 so I started both Victoza and metformin. Will decrease basal insulin dose slightly due to fasting hypoglycemia. Recheck A1c today. Goal A1c is 7.5 to 8%. Will also work toward decreasing her glucocorticoid replacement to physiologic dose (10 and 5) since higher doses can contribute to hyperglycemia. 1. Type 2 diabetes mellitus with stage 3 chronic kidney disease, with long-term current use of insulin ([facility])     Glycemic Medication Changes:  - continue Victoza 1.8mg daily  - increase metformin XR to 500mg BID  - decrease Toujeo to 50 units Qam    DM Health Maintenance: pertinent items updated in HM tab  A1c: update today  Cv/Lipids: on atorvastatin  BP/Renal: BP close to goal, on ACEi, microalbumin UTD and nonelevated - recheck next visit  Vaccines: per PCP  Podiatry: no active issues, encouraged well-fitting footwear and daily inspection  Neuro: + neuropathy, foot exam UTD  Ophtho: yearly eye exam recommended  Diet and exercise: discussed healthy eating and exercise recommendations, particularly reduction in dietary carbohydrates     2. Adrenal insufficiency ([address]): primary s/p BL adrenalectomy.  Continue HC 15mg Qam, 5mg Qpm to work toward more physiologic dose of 10/5 (she is hesitant to decrease this today). Recent sodium/potassium levels were normal - continue fludrocortisone. Check BMP today. 3. Acquired hypothyroidism: euthyroid on exam after recent LT4 dose adjustment (decrease from 112 to 100). Check TFTs to confirm this today. 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 I spent 40 minutes with the patient today and > 50% of the time was spent counseling the patient about thyroid and adrenal medication management, also diabetes management including medication options and dietary modification. Patient verbalized an understanding and will return to clinic in 4 months. Thank you for the opportunity to participate in this patient's care.     Francesco Douglas MD  [address] [place] [address] Diabetes & Endocrinology  [facility]

## 2017-08-04 NOTE — PATIENT INSTRUCTIONS
Diabetes Instructions:  - decrease Toujeo to 50 units once a day  - increase metformin to 500mg twice a day (with breakfast and dinner)  - continue Victoza 1.8mg daily

## 2017-08-05 LAB
ALBUMIN SERPL-MCNC: 4.2 G/DL (ref 3.5–4.8)
ALBUMIN/GLOB SERPL: 1.5 {RATIO} (ref 1.2–2.2)
ALP SERPL-CCNC: 79 IU/L (ref 39–117)
ALT SERPL-CCNC: 13 IU/L (ref 0–32)
AST SERPL-CCNC: 19 IU/L (ref 0–40)
BILIRUB SERPL-MCNC: 0.5 MG/DL (ref 0–1.2)
BUN SERPL-MCNC: 19 MG/DL (ref 8–27)
BUN/CREAT SERPL: 19 (ref 12–28)
CALCIUM SERPL-MCNC: 9.5 MG/DL (ref 8.7–10.3)
CHLORIDE SERPL-SCNC: 100 MMOL/L (ref 96–106)
CO2 SERPL-SCNC: 26 MMOL/L (ref 18–29)
CREAT SERPL-MCNC: 0.98 MG/DL (ref 0.57–1)
EST. AVERAGE GLUCOSE BLD GHB EST-MCNC: 183 MG/DL
GLOBULIN SER CALC-MCNC: 2.8 G/DL (ref 1.5–4.5)
GLUCOSE SERPL-MCNC: 89 MG/DL (ref 65–99)
HBA1C MFR BLD: 8 % (ref 4.8–5.6)
POTASSIUM SERPL-SCNC: 4.6 MMOL/L (ref 3.5–5.2)
PROT SERPL-MCNC: 7 G/DL (ref 6–8.5)
SODIUM SERPL-SCNC: 141 MMOL/L (ref 134–144)
T4 FREE SERPL-MCNC: 1.34 NG/DL (ref 0.82–1.77)
TSH SERPL DL<=0.005 MIU/L-ACNC: 0.05 UIU/ML (ref 0.45–4.5)

## 2017-08-07 RX ORDER — LEVOTHYROXINE SODIUM 88 UG/1
88 TABLET ORAL
Qty: 30 TAB | Refills: 5 | Status: SHIPPED | OUTPATIENT
Start: 2017-08-07 | End: 2018-02-09 | Stop reason: SDUPTHER

## 2017-08-07 NOTE — PROGRESS NOTES
Results sent with comments via [employer]. A1c has improved. Decreasing levothyroxine from 100 to 88 mcg daily since FT4 has increased and TSH has decreased further. Lab Results   Component Value Date/Time    Hemoglobin A1c 8.0 08/04/2017 11:25 AM     Lab Results   Component Value Date/Time    TSH 0.054 08/04/2017 11:25 AM    T4, Free 1.34 08/04/2017 11:25 AM    T4, Total 10.1 04/23/2011 04:07 AM     Lab Results   Component Value Date/Time    Sodium 141 08/04/2017 11:25 AM    Potassium 4.6 08/04/2017 11:25 AM    Chloride 100 08/04/2017 11:25 AM    CO2 26 08/04/2017 11:25 AM    Anion gap 7 02/03/2017 01:55 PM    Glucose 89 08/04/2017 11:25 AM    BUN 19 08/04/2017 11:25 AM    Creatinine 0.98 08/04/2017 11:25 AM    BUN/Creatinine ratio 19 08/04/2017 11:25 AM    GFR est AA 64 08/04/2017 11:25 AM    GFR est non-AA 56 08/04/2017 11:25 AM    Calcium 9.5 08/04/2017 11:25 AM    Bilirubin, total 0.5 08/04/2017 11:25 AM    AST (SGOT) 19 08/04/2017 11:25 AM    Alk.  phosphatase 79 08/04/2017 11:25 AM    Protein, total 7.0 08/04/2017 11:25 AM    Albumin 4.2 08/04/2017 11:25 AM    Globulin 3.9 02/03/2017 01:55 PM    A-G Ratio 1.5 08/04/2017 11:25 AM    ALT (SGPT) 13 08/04/2017 11:25 AM

## 2017-08-10 ENCOUNTER — HOSPITAL ENCOUNTER (OUTPATIENT)
Dept: LAB | Age: 77
Discharge: HOME | End: 2017-08-10
Payer: MEDICARE

## 2017-08-10 ENCOUNTER — OFFICE VISIT (OUTPATIENT)
Dept: INTERNAL MEDICINE CLINIC | Age: 77
End: 2017-08-10

## 2017-08-10 VITALS
OXYGEN SATURATION: 95 % | HEIGHT: 62 IN | WEIGHT: 178 LBS | SYSTOLIC BLOOD PRESSURE: 120 MMHG | TEMPERATURE: 98.1 F | DIASTOLIC BLOOD PRESSURE: 70 MMHG | HEART RATE: 75 BPM | BODY MASS INDEX: 32.76 KG/M2 | RESPIRATION RATE: 18 BRPM

## 2017-08-10 DIAGNOSIS — E11.22 CONTROLLED TYPE 2 DIABETES MELLITUS WITH STAGE 3 CHRONIC KIDNEY DISEASE, WITH LONG-TERM CURRENT USE OF INSULIN (HCC): ICD-10-CM

## 2017-08-10 DIAGNOSIS — E27.40 ADRENAL INSUFFICIENCY (HCC): Chronic | ICD-10-CM

## 2017-08-10 DIAGNOSIS — N39.0 URINARY TRACT INFECTION WITHOUT HEMATURIA, SITE UNSPECIFIED: Primary | ICD-10-CM

## 2017-08-10 DIAGNOSIS — Z79.4 CONTROLLED TYPE 2 DIABETES MELLITUS WITH STAGE 3 CHRONIC KIDNEY DISEASE, WITH LONG-TERM CURRENT USE OF INSULIN (HCC): ICD-10-CM

## 2017-08-10 DIAGNOSIS — E03.4 HYPOTHYROIDISM DUE TO ACQUIRED ATROPHY OF THYROID: Chronic | ICD-10-CM

## 2017-08-10 DIAGNOSIS — I10 ESSENTIAL HYPERTENSION, BENIGN: Chronic | ICD-10-CM

## 2017-08-10 DIAGNOSIS — E24.0 CUSHING DISEASE (HCC): Chronic | ICD-10-CM

## 2017-08-10 DIAGNOSIS — N18.30 CONTROLLED TYPE 2 DIABETES MELLITUS WITH STAGE 3 CHRONIC KIDNEY DISEASE, WITH LONG-TERM CURRENT USE OF INSULIN (HCC): ICD-10-CM

## 2017-08-10 LAB
BILIRUB UR QL STRIP: NEGATIVE
GLUCOSE UR-MCNC: NEGATIVE MG/DL
KETONES P FAST UR STRIP-MCNC: NEGATIVE MG/DL
PH UR STRIP: 5.5 [PH] (ref 4.6–8)
PROT UR QL STRIP: NEGATIVE MG/DL
SP GR UR STRIP: 1 (ref 1–1.03)
UA UROBILINOGEN AMB POC: NORMAL (ref 0.2–1)
URINALYSIS CLARITY POC: NORMAL
URINALYSIS COLOR POC: NORMAL
URINE BLOOD POC: NEGATIVE
URINE LEUKOCYTES POC: NORMAL
URINE NITRITES POC: NEGATIVE

## 2017-08-10 PROCEDURE — 87088 URINE BACTERIA CULTURE: CPT

## 2017-08-10 PROCEDURE — 87186 SC STD MICRODIL/AGAR DIL: CPT

## 2017-08-10 NOTE — PROGRESS NOTES
Subjective:     Chief Complaint   Patient presents with    Urinary Pain     f/u     She  is a 68y.o. year old female who presents for evaluation. Notes a slight burning of urination. BS are well controlled since seeing Dr Netta Garcia. Historical Data    Past Medical History:   Diagnosis Date    Arthritis osteoporosis    Cataract     [facility]     Endocrine disease     adrenal insufficiency    GERD (gastroesophageal reflux disease)     H/O Cushing disease     Hypertension     Other ill-defined conditions     elizabeth's disease    Stroke ([facility]     TIA 2011    Thyroid disease hypothyroid    UTI (lower urinary tract infection)         Past Surgical History:   Procedure Laterality Date    ENDOCRINE SURGERY PROC UNLISTED      adrenal surgery in 1972    HX APPENDECTOMY      HX CATARACT REMOVAL      ON the left    HX CHOLECYSTECTOMY      HX CHOLECYSTECTOMY  2000    HX HYSTERECTOMY      HX OTHER SURGICAL      adrenal gland removed 1970    HX TONSILLECTOMY          Outpatient Encounter Prescriptions as of 8/10/2017   Medication Sig Dispense Refill    levothyroxine (SYNTHROID) 88 mcg tablet Take 1 Tab by mouth Daily (before breakfast). 30 Tab 5    insulin glargine (TOUJEO SOLOSTAR) 300 unit/mL (1.5 mL) inpn 50 Units by SubCUTAneous route nightly. 9 Adjustable Dose Pre-filled Pen Syringe 3    Liraglutide (VICTOZA) 0.6 mg/0.1 mL (18 mg/3 mL) sub-q pen 1.8mg daily 9 mL 5    amLODIPine (NORVASC) 5 mg tablet TAKE 1 TABLET BY MOUTH DAILY. 30 Tab 3    furosemide (LASIX) 40 mg tablet Take 1 Tab by mouth daily. 90 Tab 3    CULTURELLE PROBIOTICS 10 billion cell -200 mg cpSP   99    glucose blood VI test strips (PRODIGY NO CODING) strip Patient test glucose 3 times daily. ICD E11.9 150 Strip 1    lisinopril (PRINIVIL, ZESTRIL) 40 mg tablet Take 1 Tab by mouth every morning. 90 Tab 3    ascorbic acid, vitamin C, (VITAMIN C) 500 mg tablet Take 1 Tab by mouth daily.  100 Tab 3    aspirin 81 mg chewable tablet Take 1 Tab by mouth daily. 100 Tab 3    atorvastatin (LIPITOR) 80 mg tablet Take 1 Tab by mouth nightly. (Patient taking differently: Take 40 mg by mouth nightly.) 90 Tab 3    FLUoxetine (PROZAC) 20 mg tablet Take 1 Tab by mouth nightly. 90 Tab 3    gabapentin (NEURONTIN) 300 mg capsule Take 1 Cap by mouth nightly. 90 Cap 3    fludrocortisone (FLORINEF) 0.1 mg tablet Take 1 Tab by mouth daily. 90 Tab 3    hydrocortisone (CORTEF) 5 mg tablet 15mg Qam, 5mg Qpm 120 Tab 11    pantoprazole (PROTONIX) 40 mg tablet Take 1 Tab by mouth Daily (before breakfast). Indications: GASTROESOPHAGEAL REFLUX 30 Tab 1    colestipol (COLESTID) 1 gram tablet Take 1 g by mouth two (2) times a day.  trimethoprim-sulfamethoxazole (BACTRIM DS, SEPTRA DS) 160-800 mg per tablet TAKE 1 TABLET BY MOUTH TWO (2) TIMES A DAY FOR 3 DAYS.  0    metFORMIN ER (GLUCOPHAGE XR) 500 mg tablet Take 1 Tab by mouth two (2) times daily (with meals). 180 Tab 3    [DISCONTINUED] FLUoxetine (PROZAC) 20 mg capsule TAKE 1 CAPSULE BY MOUTH NIGHTLY. 3    nitrofurantoin, macrocrystal-monohydrate, (MACROBID) 100 mg capsule Take 1 Cap by mouth two (2) times a day. Indications: BACTERIAL URINARY TRACT INFECTION 10 Cap 0    trospium (SANCTURA) 20 mg tablet TAKE 1 TABLET BY MOUTH TWICE A DAY. 3    cefUROXime (CEFTIN) 250 mg tablet   0    ESTRACE 0.01 % (0.1 mg/gram) vaginal cream   3    oxybutynin (DITROPAN) 5 mg tablet   2    [DISCONTINUED] [facility] ASPIRIN 81 mg tablet   99    meloxicam (MOBIC) 15 mg tablet Take 15 mg by mouth daily. No facility-administered encounter medications on file as of 8/10/2017. Allergies   Allergen Reactions    Amoxicillin Unknown (comments)    Erythromycin Rash and Unknown (comments)     fever        Social History     Social History    Marital status:      Spouse name: N/A    Number of children: N/A    Years of education: N/A     Occupational History    Not on file.      Social History Main Topics    Smoking status: Never Smoker    Smokeless tobacco: Never Used    Alcohol use No    Drug use: No    Sexual activity: Not Currently     Other Topics Concern    Not on file     Social History Narrative        Review of Systems  A comprehensive review of systems was negative except for that written in the HPI. Objective:     Vitals:    08/10/17 0952   BP: 120/70   Pulse: 75   Resp: 18   Temp: 98.1 °F (36.7 °C)   TempSrc: Oral   SpO2: 95%   Weight: 178 lb (80.7 kg)   Height: 5' 2\" (1.575 m)     Pleasant WF in no acute distress. Neck: Supple.'  Cardiac: RRR without murmurs, gallops or rubs. Lungs: Clear to auscultation. Abdomen:  Soft. Slight sensitivity in left abdomen. Neuro: Intact. ASSESSMENT / PLAN:   1. Urinary tract infection without hematuria, site unspecified  · Leukocyte esterase is positive so will do culture. - AMB POC URINALYSIS DIP STICK AUTO W/O MICRO    2. Essential hypertension, benign  · Controlled    3. Cushing disease ([address]  · Per Dr Nadia Reddy    4. Adrenal insufficiency ([address]  · Per Dr Nadia Reddy    5. Hypothyroidism due to acquired atrophy of thyroid  · Per Dr Nadia Reddy    6. Controlled type 2 diabetes mellitus with stage 3 chronic kidney disease, with long-term current use of insulin (HCC)  · Dietary diet. · Continue current medical regimen. Follow-up Disposition: Not on File   Advised her to call back or return to office if symptoms worsen/change/persist.  Discussed expected course/resolution/complications of diagnosis in detail with patient. Medication risks/benefits/costs/interactions/alternatives discussed with patient. She was given an after visit summary which includes diagnoses, current medications, & vitals. She expressed understanding with the diagnosis and plan.

## 2017-08-10 NOTE — PROGRESS NOTES
Chief Complaint   Patient presents with    Urinary Pain     f/u     Reviewed record in preparation for visit and have obtained necessary documentation. Identified pt with two pt identifiers(name and ). Health Maintenance Due   Topic    EYE EXAM RETINAL OR DILATED Q1     ZOSTER VACCINE AGE 60>     MICROALBUMIN Q1     INFLUENZA AGE 9 TO ADULT          Chief Complaint   Patient presents with    Urinary Pain     f/u        Wt Readings from Last 3 Encounters:   08/10/17 178 lb (80.7 kg)   17 177 lb 3.2 oz (80.4 kg)   17 178 lb 6.4 oz (80.9 kg)     Temp Readings from Last 3 Encounters:   08/10/17 98.1 °F (36.7 °C) (Oral)   17 98.5 °F (36.9 °C)   17 99.1 °F (37.3 °C) (Oral)     BP Readings from Last 3 Encounters:   08/10/17 120/70   17 128/84   17 130/72     Pulse Readings from Last 3 Encounters:   08/10/17 75   17 70   17 78           Learning Assessment:  :     Learning Assessment 2017   PRIMARY LEARNER Patient   HIGHEST LEVEL OF EDUCATION - PRIMARY LEARNER  > 4 YEARS OF COLLEGE   BARRIERS PRIMARY LEARNER NONE   PRIMARY LANGUAGE ENGLISH   LEARNER PREFERENCE PRIMARY LISTENING   ANSWERED BY patient   RELATIONSHIP SELF       Depression Screening:  :     No flowsheet data found. Fall Risk Assessment:  :     Fall Risk Assessment, last 12 mths 2017   Able to walk? Yes   Fall in past 12 months? No       Abuse Screening:  :     Abuse Screening Questionnaire 2017   Do you ever feel afraid of your partner? N   Are you in a relationship with someone who physically or mentally threatens you? N   Is it safe for you to go home?  Y       Coordination of Care Questionnaire:  :     1) Have you been to an emergency room, urgent care clinic since your last visit? no   Hospitalized since your last visit? no             2) Have you seen or consulted any other health care providers outside of [address] [place] [address] since your last visit? no  (Include any pap smears or colon screenings in this section.)    3) Do you have an Advance Directive on file? yes    4) Are you interested in receiving information on Advance Directives? NO      Patient is accompanied by nursing attendant I have received verbal consent from Birgit Jean to discuss any/all medical information while they are present in the room. Reviewed record  In preparation for visit and have obtained necessary documentation.

## 2017-08-15 DIAGNOSIS — N39.0 URINARY TRACT INFECTION WITHOUT HEMATURIA, SITE UNSPECIFIED: Primary | ICD-10-CM

## 2017-08-15 LAB
BACTERIA UR CULT: ABNORMAL
BACTERIA UR CULT: ABNORMAL

## 2017-08-15 RX ORDER — CEFUROXIME AXETIL 250 MG/1
250 TABLET ORAL 2 TIMES DAILY
Qty: 10 TAB | Refills: 0 | Status: SHIPPED | OUTPATIENT
Start: 2017-08-15 | End: 2017-08-20

## 2017-09-20 ENCOUNTER — TELEPHONE (OUTPATIENT)
Dept: INTERNAL MEDICINE CLINIC | Age: 77
End: 2017-09-20

## 2017-09-20 ENCOUNTER — OFFICE VISIT (OUTPATIENT)
Dept: INTERNAL MEDICINE CLINIC | Age: 77
End: 2017-09-20

## 2017-09-20 ENCOUNTER — HOSPITAL ENCOUNTER (OUTPATIENT)
Dept: LAB | Age: 77
Discharge: HOME | End: 2017-09-20
Payer: MEDICARE

## 2017-09-20 VITALS
WEIGHT: 174 LBS | HEIGHT: 62 IN | DIASTOLIC BLOOD PRESSURE: 80 MMHG | BODY MASS INDEX: 32.02 KG/M2 | SYSTOLIC BLOOD PRESSURE: 140 MMHG | TEMPERATURE: 97 F | RESPIRATION RATE: 13 BRPM | HEART RATE: 72 BPM | OXYGEN SATURATION: 96 %

## 2017-09-20 DIAGNOSIS — R82.998 HIGH URINE LEUKOCYTE COUNT: ICD-10-CM

## 2017-09-20 DIAGNOSIS — Z79.4 CONTROLLED TYPE 2 DIABETES MELLITUS WITH STAGE 3 CHRONIC KIDNEY DISEASE, WITH LONG-TERM CURRENT USE OF INSULIN (HCC): ICD-10-CM

## 2017-09-20 DIAGNOSIS — R39.15 URGENCY OF URINATION: Primary | ICD-10-CM

## 2017-09-20 DIAGNOSIS — E11.22 CONTROLLED TYPE 2 DIABETES MELLITUS WITH STAGE 3 CHRONIC KIDNEY DISEASE, WITH LONG-TERM CURRENT USE OF INSULIN (HCC): ICD-10-CM

## 2017-09-20 DIAGNOSIS — R82.90 FOUL SMELLING URINE: ICD-10-CM

## 2017-09-20 DIAGNOSIS — N18.30 CONTROLLED TYPE 2 DIABETES MELLITUS WITH STAGE 3 CHRONIC KIDNEY DISEASE, WITH LONG-TERM CURRENT USE OF INSULIN (HCC): ICD-10-CM

## 2017-09-20 DIAGNOSIS — E27.40 ADRENAL INSUFFICIENCY (HCC): Chronic | ICD-10-CM

## 2017-09-20 LAB
BILIRUB UR QL STRIP: NEGATIVE
GLUCOSE UR-MCNC: NEGATIVE MG/DL
KETONES P FAST UR STRIP-MCNC: NEGATIVE MG/DL
PH UR STRIP: 6 [PH] (ref 4.6–8)
PROT UR QL STRIP: NEGATIVE MG/DL
SP GR UR STRIP: 1 (ref 1–1.03)
UA UROBILINOGEN AMB POC: NORMAL (ref 0.2–1)
URINALYSIS CLARITY POC: NORMAL
URINALYSIS COLOR POC: YELLOW
URINE BLOOD POC: NORMAL
URINE LEUKOCYTES POC: NORMAL
URINE NITRITES POC: NEGATIVE

## 2017-09-20 PROCEDURE — 87186 SC STD MICRODIL/AGAR DIL: CPT

## 2017-09-20 PROCEDURE — 87086 URINE CULTURE/COLONY COUNT: CPT

## 2017-09-20 RX ORDER — NITROFURANTOIN 25; 75 MG/1; MG/1
CAPSULE ORAL
Qty: 10 CAP | Refills: 0 | Status: SHIPPED | OUTPATIENT
Start: 2017-09-20 | End: 2018-04-26 | Stop reason: ALTCHOICE

## 2017-09-20 NOTE — PROGRESS NOTES
Patient's identity verified with two patient identifiers (name and date of birth). 1. Have you been to the ER, urgent care clinic since your last visit? Hospitalized since your last visit? No  2. Have you seen or consulted any other health care providers outside of the [address] [place] [address] since your last visit? Include any pap smears or colon screening. No    Chief Complaint   Patient presents with    Urgency     Urgency of urination since yesterday. Mild odor change in urine. Denies burning with urination or flank pain. Not fasting. Health Maintenance Due   Topic Date Due    EYE EXAM RETINAL OR DILATED Q1   Done x8 mos ago, at [facility]. 06/29/1950    ZOSTER VACCINE AGE 60>   Thinks she had this at pharmacy.  04/29/2000    MICROALBUMIN Q1  02/11/2017    LIPID PANEL Q1  10/13/2017

## 2017-09-20 NOTE — PROGRESS NOTES
69 yo female reports onset of urinary urgency for over 24 hours. She had E. Coli UTIs in July and August.  She reports she has fairly frequently loose stools. PE: WNWD elderly WF   T - 97   Repeat BP = 140/80    Imp: Urinary freq   Hx UTIs   DM     Plan: Patient unable to void - will send a urine hat and collection cup for urine which will be returned to our office within 1 hr of collection outside our office   Discussed hygiene of perineal area especially in light of stool consistency   Diabetic Retinal Eye Exam done today   See Dr. Casey Seay as scheduled on 10/2    Patient and her accompanying care-giver are not able to review med list today, and report her med box is filled weekly by the pharmacy where she resides at [facility]. After patient's visit today, I called OREST[facility] Wheatley who fills her weekly medication box (since Jan 2017). The pharmacist reports the order they hav for her Atorvastatin is for 80 mg, and they have been filling her box w/ an 80 mg tablet. Her med list here shows she is taking 40 mg. On investigation of the chart, it appears the dose of Atorvastatin was increased from 40 to 80 mg during hospitalization in Oct 2016 when her TRG level was found to be 396. She had a L occipital stroke discovered in April 2016. Plan: Med list is corrected to show 80 mg Atorvastatin daily  ____________________________  Expected course of current diagnosed problem(s) as well as expected progression and possible complications, and desired follow up with provider are discussed with patient. Patient is encouraged to be back in touch with any questions or concerns. Patient expresses understanding of plan of care. Patient is given AVS which includes diagnoses, current medications, vitals.   ____________________________  Urine sample was brought back into the office in later in the day, and testing shows 4+leuks  Plan: Send for culture   Based on review of last 2 urine culture results, will send in 5 days of Macrobid while awaiting culture report.

## 2017-09-20 NOTE — TELEPHONE ENCOUNTER
Patient dropped off urine in office to be run for UA as she missed toilet hat at today's appointment this morning. Urine with strong odor. Testing for UA resulted in chart and discussed with Nicholas Schreiber NP. Provider states she will send in Macrobid BID x5 days for infection and will send culture for sensitivity. Called to speak to patient, Emergency contact Jasper Parada answered phone. MID/HIPAA and two patient identifiers verified. Advised of above and that we will call if antibiotic needs to be changed. She verbalizes understanding.

## 2017-09-20 NOTE — MR AVS SNAPSHOT
Visit Information Date & Time Provider Department Dept. Phone Encounter #  
 9/20/2017  9:40 AM [facility] [phone] Your Appointments 10/2/2017 10:00 AM  
ROUTINE CARE with MD Mckinley She[facility]) Appt Note: discuss dementia  
 [address], [address] [facility] [address] [facility] [address]  
[address], [address] [address]  
  
    
 12/4/2017  9:30 AM  
ROUTINE CARE with MD [facility]) Appt Note: 4m f/u  
 [address] [facility] [address]  
[address] [address]  
  
    
 12/8/2017 10:00 AM  
ROUTINE CARE with MD Mckinley Sheehan [facility]) Appt Note: 4mF/U AI and DM.; 4mF/U AI and DM.  
 [address], [address] [facility]  
[phone] Upcoming Health Maintenance Date Due  
 EYE EXAM RETINAL OR DILATED Q1 6/29/1950 ZOSTER VACCINE AGE 60> 4/29/2000 MICROALBUMIN Q1 2/11/2017 LIPID PANEL Q1 10/13/2017 INFLUENZA AGE 9 TO ADULT 10/27/2017* FOOT EXAM Q1 12/19/2017 HEMOGLOBIN A1C Q6M 2/4/2018 MEDICARE YEARLY EXAM 4/19/2018 GLAUCOMA SCREENING Q2Y 10/12/2018 DTaP/Tdap/Td series (2 - Td) 3/30/2020 *Topic was postponed. The date shown is not the original due date. Allergies as of 9/20/2017  Review Complete On: 9/20/2017 By: [email or web address] Severity Noted Reaction Type Reactions Amoxicillin  10/13/2016    Unknown (comments) Erythromycin  04/22/2011    Rash, Unknown (comments) fever Current Immunizations  Reviewed on 4/2/2016 Name Date DTaP 3/30/2010 Influenza Vaccine 10/30/2015 Influenza Vaccine Whole 10/15/2010 ZZZ-RETIRED (DO NOT USE) Pneumococcal Vaccine (Unspecified Type) 10/15/2009 Not reviewed this visit You Were Diagnosed With   
  
 Codes Comments Urgency of urination    -  Primary ICD-10-CM: R39.15 ICD-9-CM: 215.77 Controlled type 2 diabetes mellitus with stage 3 chronic kidney disease, with long-term current use of insulin (HCC)     ICD-10-CM: E11.22, N18.3, Z79.4 ICD-9-CM: 250.40, 585.3, V58.67 Vitals BP Pulse Temp Resp Height(growth percentile) Weight(growth percentile) 140/80 72 97 °F (36.1 °C) (Oral) 13 5' 2\" (1.575 m) 174 lb (78.9 kg) SpO2 BMI OB Status Smoking Status 96% 31.83 kg/m2 Postmenopausal Never Smoker Vitals History BMI and BSA Data Body Mass Index Body Surface Area  
 31.83 kg/m 2 1.86 m 2 Preferred Pharmacy Pharmacy Name Phone [facility] [phone] Your Updated Medication List  
  
   
This list is accurate as of: 9/20/17 10:29 AM.  Always use your most recent med list. amLODIPine 5 mg tablet Commonly known as:  Oanh Tiago TAKE 1 TABLET BY MOUTH DAILY. ascorbic acid (vitamin C) 500 mg tablet Commonly known as:  VITAMIN C Take 1 Tab by mouth daily. aspirin 81 mg chewable tablet Take 1 Tab by mouth daily. atorvastatin 80 mg tablet Commonly known as:  LIPITOR Take 1 Tab by mouth nightly. colestipol 1 gram tablet Commonly known as:  COLESTID Take 1 g by mouth two (2) times a day. CULTURELLE PROBIOTICS 10 billion cell -200 mg Cpsp Generic drug:  Lactobac. rhamnosus GG-inulin ESTRACE 0.01 % (0.1 mg/gram) vaginal cream  
Generic drug:  estradiol  
  
 fludrocortisone 0.1 mg tablet Commonly known as:  FLORINEF Take 1 Tab by mouth daily. FLUoxetine 20 mg tablet Commonly known as:  PROzac Take 1 Tab by mouth nightly. furosemide 40 mg tablet Commonly known as:  LASIX Take 1 Tab by mouth daily. gabapentin 300 mg capsule Commonly known as:  NEURONTIN  
 Take 1 Cap by mouth nightly. glucose blood VI test strips strip Commonly known as:  PRODIGY NO CODING Patient test glucose 3 times daily. ICD E11.9  
  
 hydrocortisone 5 mg tablet Commonly known as:  CORTEF  
15mg Qam, 5mg Qpm  
  
 insulin glargine 300 unit/mL (1.5 mL) Inpn Commonly known as:  TOUJEO SOLOSTAR  
50 Units by SubCUTAneous route nightly. levothyroxine 88 mcg tablet Commonly known as:  SYNTHROID Take 1 Tab by mouth Daily (before breakfast). Liraglutide 0.6 mg/0.1 mL (18 mg/3 mL) Pnij Commonly known as:  VICTOZA  
1.8mg daily  
  
 lisinopril 40 mg tablet Commonly known as:  Monika Juana Take 1 Tab by mouth every morning. meloxicam 15 mg tablet Commonly known as:  MOBIC Take 15 mg by mouth daily. metFORMIN  mg tablet Commonly known as:  GLUCOPHAGE XR Take 1 Tab by mouth two (2) times daily (with meals). oxybutynin 5 mg tablet Commonly known as:  DITROPAN  
  
 pantoprazole 40 mg tablet Commonly known as:  PROTONIX Take 1 Tab by mouth Daily (before breakfast). Indications: GASTROESOPHAGEAL REFLUX  
  
 trospium 20 mg tablet Commonly known as:  Melendez Lucama TAKE 1 TABLET BY MOUTH TWICE A DAY. We Performed the Following AMB POC FUNDUS PHOTOGRAPHY WITH INTERP AND REPORT [74567 CPT(R)] AMB POC URINALYSIS DIP STICK AUTO W/O MICRO [35298 CPT(R)] Introducing [facility]! Dear Makayla Hendrickson: Thank you for requesting a [facility] account. Our records indicate that you already have an active [facility] account. You can access your account anytime at [email or web address]. [employer] Did you know that you can access your hospital and ER discharge instructions at any time in [facility]? You can also review all of your test results from your hospital stay or ER visit. Additional Information If you have questions, please visit the Frequently Asked Questions section of the [employer] website at [email or web address] [email or web address] Remember, [facility] is NOT to be used for urgent needs. For medical emergencies, dial 911. Now available from your iPhone and Android! Please provide this summary of care documentation to your next provider. Your primary care clinician is listed as Kyler Spann. If you have any questions after today's visit, please call [phone].

## 2017-09-22 NOTE — TELEPHONE ENCOUNTER
Diana Shabazz is not on pt's [facility], unable to provide further information. Call to pt's daughter (Traci/HIPPA) & advised we are unable to release information. She verbalized understanding, expressed gratitude for the safety measure taken to protect the pt's PHI and states that she will have her mother add Diana Shabazz to the HIPPA form at her next visit. Leopoldo Dubois was advised that at this time we have no further information regarding the urine culture, as it has not come back. Notified that once we receive additional information the provider will be able to further assess if an alternative medication is needed.  was given an opportunity to ask questions, repeated information, and verbalized understanding. All questions were answered to patients satisfaction. No further questions or concerns at this time.

## 2017-09-22 NOTE — TELEPHONE ENCOUNTER
raphael the care giver for Ana Maria Rose call today asking about the urine test and whether she needs medication raphael is not on her hippa call back # [phone]

## 2017-09-24 LAB — BACTERIA UR CULT: ABNORMAL

## 2017-10-09 ENCOUNTER — TELEPHONE (OUTPATIENT)
Dept: INTERNAL MEDICINE CLINIC | Age: 77
End: 2017-10-09

## 2017-10-09 NOTE — TELEPHONE ENCOUNTER
Received call from caregiver Linh Blackburn) - she states that she checked Ms. Zohra Davis' blood sugar at 0830 prior to giving her breakfast. Pt's BS reading was 484. After eating her oatmeal, the caregiver states she rechecked the pt's BS and it was 540. Ellison Caller voiced concerns regarding pt's elevated blood sugar levels and questioned what she should do, at what number value should the elevated numbers be a concerns and when should she carry the pt to the ED. Caregiver and pt denied any s/s of concerns and states the pt \"just seems to be very tired\".      Advised that she can encourage the pt to increase her water intake to help with flushing until further recommendations are provided by Dr. Zoey Allen.

## 2017-10-09 NOTE — TELEPHONE ENCOUNTER
Herminio Fong MD   You 15 minutes ago (10:10 AM)        Send to ED please. Dr Eliazar Castillo (Routing comment)         Call to Jennifer Santizo, daughter/MATEO, and advised of Dr. Juan José Booth message. She states that she will contact Ellison Caller, as she is not on the \[facility] [place] [facility]\"" form to advise of this information.

## 2017-10-11 ENCOUNTER — OFFICE VISIT (OUTPATIENT)
Dept: INTERNAL MEDICINE CLINIC | Age: 77
End: 2017-10-11

## 2017-10-11 ENCOUNTER — HOSPITAL ENCOUNTER (OUTPATIENT)
Dept: LAB | Age: 77
Discharge: HOME | End: 2017-10-11
Payer: MEDICARE

## 2017-10-11 VITALS
TEMPERATURE: 97.1 F | RESPIRATION RATE: 14 BRPM | BODY MASS INDEX: 32.61 KG/M2 | HEIGHT: 62 IN | OXYGEN SATURATION: 97 % | DIASTOLIC BLOOD PRESSURE: 74 MMHG | HEART RATE: 71 BPM | SYSTOLIC BLOOD PRESSURE: 106 MMHG | WEIGHT: 177.2 LBS

## 2017-10-11 DIAGNOSIS — N30.01 ACUTE CYSTITIS WITH HEMATURIA: Primary | ICD-10-CM

## 2017-10-11 DIAGNOSIS — Z23 NEED FOR PNEUMOCOCCAL VACCINE: ICD-10-CM

## 2017-10-11 DIAGNOSIS — R35.0 URINARY FREQUENCY: ICD-10-CM

## 2017-10-11 LAB
BILIRUB UR QL STRIP: NEGATIVE
GLUCOSE UR-MCNC: NEGATIVE MG/DL
KETONES P FAST UR STRIP-MCNC: NEGATIVE MG/DL
PH UR STRIP: 7 [PH] (ref 4.6–8)
PROT UR QL STRIP: NEGATIVE MG/DL
SP GR UR STRIP: 1.01 (ref 1–1.03)
UA UROBILINOGEN AMB POC: NORMAL (ref 0.2–1)
URINALYSIS CLARITY POC: NORMAL
URINALYSIS COLOR POC: YELLOW
URINE BLOOD POC: NORMAL
URINE LEUKOCYTES POC: NORMAL
URINE NITRITES POC: POSITIVE

## 2017-10-11 PROCEDURE — 87186 SC STD MICRODIL/AGAR DIL: CPT

## 2017-10-11 PROCEDURE — 87086 URINE CULTURE/COLONY COUNT: CPT

## 2017-10-11 RX ORDER — CEFTRIAXONE 1 G/1
1 INJECTION, POWDER, FOR SOLUTION INTRAMUSCULAR; INTRAVENOUS ONCE
Qty: 1 VIAL | Refills: 0
Start: 2017-10-11 | End: 2017-10-11

## 2017-10-11 RX ORDER — CEFTRIAXONE 1 G/1
1 INJECTION, POWDER, FOR SOLUTION INTRAMUSCULAR; INTRAVENOUS ONCE
Qty: 1 VIAL | Refills: 0
Start: 2017-10-13 | End: 2017-10-11 | Stop reason: CLARIF

## 2017-10-11 RX ORDER — CEFTRIAXONE 2 G/1
2 INJECTION, POWDER, FOR SOLUTION INTRAMUSCULAR; INTRAVENOUS ONCE
Qty: 1 VIAL | Refills: 0
Start: 2017-10-11 | End: 2017-10-11

## 2017-10-11 RX ORDER — CEFTRIAXONE 1 G/1
1 INJECTION, POWDER, FOR SOLUTION INTRAMUSCULAR; INTRAVENOUS ONCE
Qty: 1 VIAL | Refills: 0
Start: 2017-10-12 | End: 2017-10-11 | Stop reason: CLARIF

## 2017-10-11 NOTE — PROGRESS NOTES
Subjective:     Tracey Wright is a 68 y.o. female who complains of dysuria, frequency, urgency for 3 weeks. Patient denies flank pain, vomiting, fever, unusual vaginal discharge. Patient does have a history of recurrent UTI. Patient does not have a history of pyelonephritis. Patient was treated on 9/20/17 for a UTI. Culture grew E.coli that was resistant to most antibiotics. Patient completed course of Macrobid with no improvement. Patient has frequent diarrhea normally and wears depends.       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    Depression F32.9    Elevated cholesterol E78.00    Vitamin D deficiency E55.9    Adrenal insufficiency ([facility]) E27.40    Hypothyroidism E03.9    Intracranial vascular stenosis I67.9    Meningioma ([address] D32.9    Cerebral infarction due to stenosis of left posterior cerebral artery ([facility]) E19.035    Unstable angina ([facility]) I20.0    Controlled type 2 diabetes mellitus with stage 3 chronic kidney disease, with long-term current use of insulin ([facility]) E11.22, N18.3, Z79.4     Patient Active Problem List    Diagnosis Date Noted    Controlled type 2 diabetes mellitus with stage 3 chronic kidney disease, with long-term current use of insulin ([address] 01/16/2017    Unstable angina ([address] 10/13/2016    Meningioma ([address] 04/07/2016    Cerebral infarction due to stenosis of left posterior cerebral artery ([address] 04/07/2016    Intracranial vascular stenosis 04/05/2016    Adrenal insufficiency ([address] 01/09/2012    Hypothyroidism 01/09/2012    TIA (transient ischemic attack) 04/22/2011    Essential hypertension, benign 04/22/2011    Recurrent UTI 04/22/2011    RLS (restless legs syndrome) 04/22/2011    Esophageal reflux 04/22/2011    Cushing disease ([address] 04/22/2011    Depression 04/22/2011    Elevated cholesterol 04/22/2011    Vitamin D deficiency 04/22/2011     Current Outpatient Prescriptions   Medication Sig Dispense Refill    cefTRIAXone (ROCEPHIN) 2 gram injection 2,000 mg by IntraMUSCular route once for 1 dose. 1 Vial 0    pneumococcal 13 aashish conj dip (PREVNAR 13, PF,) 0.5 mL syrg injection 0.5 mL by IntraMUSCular route once for 1 dose. 0.5 mL 0    nitrofurantoin, macrocrystal-monohydrate, (MACROBID) 100 mg capsule One BID WITH FOOD  Indications: BACTERIAL URINARY TRACT INFECTION 10 Cap 0    levothyroxine (SYNTHROID) 88 mcg tablet Take 1 Tab by mouth Daily (before breakfast). 30 Tab 5    insulin glargine (TOUJEO SOLOSTAR) 300 unit/mL (1.5 mL) inpn 50 Units by SubCUTAneous route nightly. 9 Adjustable Dose Pre-filled Pen Syringe 3    metFORMIN ER (GLUCOPHAGE XR) 500 mg tablet Take 1 Tab by mouth two (2) times daily (with meals). 180 Tab 3    Liraglutide (VICTOZA) 0.6 mg/0.1 mL (18 mg/3 mL) sub-q pen 1.8mg daily 9 mL 5    amLODIPine (NORVASC) 5 mg tablet TAKE 1 TABLET BY MOUTH DAILY. 30 Tab 3    furosemide (LASIX) 40 mg tablet Take 1 Tab by mouth daily. 90 Tab 3    trospium (SANCTURA) 20 mg tablet TAKE 1 TABLET BY MOUTH TWICE A DAY. 3    CULTURELLE PROBIOTICS 10 billion cell -200 mg cpSP   99    ESTRACE 0.01 % (0.1 mg/gram) vaginal cream   3    oxybutynin (DITROPAN) 5 mg tablet   2    glucose blood VI test strips (PRODIGY NO CODING) strip Patient test glucose 3 times daily. ICD E11.9 150 Strip 1    lisinopril (PRINIVIL, ZESTRIL) 40 mg tablet Take 1 Tab by mouth every morning. 90 Tab 3    ascorbic acid, vitamin C, (VITAMIN C) 500 mg tablet Take 1 Tab by mouth daily. 100 Tab 3    aspirin 81 mg chewable tablet Take 1 Tab by mouth daily. 100 Tab 3    atorvastatin (LIPITOR) 80 mg tablet Take 1 Tab by mouth nightly. 90 Tab 3    FLUoxetine (PROZAC) 20 mg tablet Take 1 Tab by mouth nightly.  90 Tab 3    gabapentin (NEURONTIN) 300 mg capsule Take 1 Cap by mouth nightly. 90 Cap 3    fludrocortisone (FLORINEF) 0.1 mg tablet Take 1 Tab by mouth daily. 90 Tab 3    meloxicam (MOBIC) 15 mg tablet Take 15 mg by mouth daily.  hydrocortisone (CORTEF) 5 mg tablet 15mg Qam, 5mg Qpm 120 Tab 11    pantoprazole (PROTONIX) 40 mg tablet Take 1 Tab by mouth Daily (before breakfast). Indications: GASTROESOPHAGEAL REFLUX 30 Tab 1    colestipol (COLESTID) 1 gram tablet Take 1 g by mouth two (2) times a day. Allergies   Allergen Reactions    Amoxicillin Unknown (comments)    Erythromycin Rash and Unknown (comments)     fever     Past Medical History:   Diagnosis Date    Arthritis osteoporosis    Cataract     [facility]     Endocrine disease 1972    adrenal insufficiency - bilat adrenalectomy    GERD (gastroesophageal reflux disease)     H/O Cushing disease     Hypertension     Other ill-defined conditions(799.89)     elizabeth's disease    Stroke ([facility] 2016    TIA 2011 & L occipial stroke 4/2016    Thyroid disease hypothyroid    UTI (lower urinary tract infection)      Past Surgical History:   Procedure Laterality Date    ENDOCRINE SURGERY PROC UNLISTED      bilat adrenalectomy in 1972    HX APPENDECTOMY      HX CATARACT REMOVAL      ON the left    HX CHOLECYSTECTOMY      HX CHOLECYSTECTOMY  2000    HX HYSTERECTOMY      HX OTHER SURGICAL  1972    adrenal gland removed 1970s    HX TONSILLECTOMY       Family History   Problem Relation Age of Onset    Cancer Mother     Diabetes Mother     Stroke Mother     Psychiatric Disorder Mother     Dementia Mother     Headache Mother     Heart Disease Father     Psychiatric Disorder Father     Stroke Father     Asthma Sister     Diabetes Sister     Asthma Brother     Heart Disease Brother      Social History   Substance Use Topics    Smoking status: Never Smoker    Smokeless tobacco: Never Used    Alcohol use No        Review of Systems  Pertinent items are noted in HPI.     Objective:     Visit Vitals  /74 (BP 1 Location: Right arm, BP Patient Position: Sitting)    Pulse 71    Temp 97.1 °F (36.2 °C) (Oral)    Resp 14    Ht 5' 2\" (1.575 m)    Wt 177 lb 3.2 oz (80.4 kg)    SpO2 97%    BMI 32.41 kg/m2     General:  alert, cooperative, no distress   Abdomen: soft, nontender, nondistended, no masses or organomegaly  not examined. Back:  CVA tenderness absent   :  defer exam     Laboratory:   Urine dipstick shows positive for RBC's, positive for nitrates and positive for leukocytes. Micro exam: not done. Assessment/Plan:     UTI, Acute cystitis     1. rocephin 2g today then 1g for the next 2 days  2. Maintain adequate hydration  3. May use OTC pyridium as desired, which will turn urine orange/red color  4. Follow up if symptoms not improving, and prn. ICD-10-CM ICD-9-CM    1. Acute cystitis with hematuria N30.01 595.0 cefTRIAXone (ROCEPHIN) 2 gram injection      CEFTRIAXONE SODIUM INJECTION  MG      VA THER/PROPH/DIAG INJECTION, SUBCUT/IM      CANCELED: CEFTRIAXONE SODIUM INJECTION  MG      CANCELED: VA THER/PROPH/DIAG INJECTION, SUBCUT/IM      CANCELED: CEFTRIAXONE SODIUM INJECTION  MG      CANCELED: VA THER/PROPH/DIAG INJECTION, SUBCUT/IM   2. Urinary frequency R35.0 788.41 AMB POC URINALYSIS DIP STICK AUTO W/O MICRO      CULTURE, URINE   3. Need for pneumococcal vaccine Z23 V03.82 pneumococcal 13 aashish conj dip (PREVNAR 13, PF,) 0.5 mL syrg injection     Orders Placed This Encounter    CULTURE, URINE    AMB POC URINALYSIS DIP STICK AUTO W/O MICRO    cefTRIAXone (ROCEPHIN) 2 gram injection    DISCONTD: cefTRIAXone (ROCEPHIN) 1 gram injection    DISCONTD: cefTRIAXone (ROCEPHIN) 1 gram injection    pneumococcal 13 aashish conj dip (PREVNAR 13, PF,) 0.5 mL syrg injection   . follow-up the next 2 days for subsequent rocephin injections

## 2017-10-11 NOTE — MR AVS SNAPSHOT
Visit Information Date & Time Provider Department Dept. Phone Encounter #  
 10/11/2017  9:45 AM CONCHA Villalba [facility] [phone] [id number] Follow-up Instructions Return in about 1 week (around 10/18/2017) for follow-up. Your Appointments 12/4/2017  9:30 AM  
ROUTINE CARE with MD Donovan Mosquera [facility] Appt Note: 4m f/u  
 [address] [facility] [address]  
[address] [address]  
  
    
 12/8/2017 10:00 AM  
ROUTINE CARE with MD Mckinley Legih [facility]) Appt Note: 4mF/U AI and DM.; 4mF/U AI and DM.  
 [address] [place]  
[address], Geovanny Darnell [address] [address] Upcoming Health Maintenance Date Due ZOSTER VACCINE AGE 60> 4/29/2000 MICROALBUMIN Q1 2/11/2017 LIPID PANEL Q1 10/13/2017 INFLUENZA AGE 9 TO ADULT 10/27/2017* FOOT EXAM Q1 12/19/2017 HEMOGLOBIN A1C Q6M 2/4/2018 MEDICARE YEARLY EXAM 4/19/2018 EYE EXAM RETINAL OR DILATED Q1 9/20/2018 GLAUCOMA SCREENING Q2Y 10/12/2018 DTaP/Tdap/Td series (2 - Td) 3/30/2020 *Topic was postponed. The date shown is not the original due date. Allergies as of 10/11/2017  Review Complete On: 10/11/2017 By: Lincoln Fatima LPN Severity Noted Reaction Type Reactions Amoxicillin  10/13/2016    Unknown (comments) Erythromycin  04/22/2011    Rash, Unknown (comments) fever Current Immunizations  Reviewed on 4/2/2016 Name Date DTaP 3/30/2010 Influenza Vaccine 10/30/2015 Influenza Vaccine Whole 10/15/2010 ZZZ-RETIRED (DO NOT USE) Pneumococcal Vaccine (Unspecified Type) 10/15/2009 Not reviewed this visit You Were Diagnosed With   
  
 Codes Comments Acute cystitis with hematuria    -  Primary ICD-10-CM: N30.01 
ICD-9-CM: 595.0 Urinary frequency     ICD-10-CM: R35.0 ICD-9-CM: 788.41 Need for pneumococcal vaccine     ICD-10-CM: K04 ICD-9-CM: V03.82 Vitals BP Pulse Temp Resp Height(growth percentile) Weight(growth percentile) 106/74 (BP 1 Location: Right arm, BP Patient Position: Sitting) 71 97.1 °F (36.2 °C) (Oral) 14 5' 2\" (1.575 m) 177 lb 3.2 oz (80.4 kg) SpO2 BMI OB Status Smoking Status 97% 32.41 kg/m2 Postmenopausal Never Smoker BMI and BSA Data Body Mass Index Body Surface Area  
 32.41 kg/m 2 1.88 m 2 Preferred Pharmacy Pharmacy Name Phone [facility] [phone] Your Updated Medication List  
  
   
This list is accurate as of: 10/11/17 10:49 AM.  Always use your most recent med list. amLODIPine 5 mg tablet Commonly known as:  Omaira Spruce TAKE 1 TABLET BY MOUTH DAILY. ascorbic acid (vitamin C) 500 mg tablet Commonly known as:  VITAMIN C Take 1 Tab by mouth daily. aspirin 81 mg chewable tablet Take 1 Tab by mouth daily. atorvastatin 80 mg tablet Commonly known as:  LIPITOR Take 1 Tab by mouth nightly. cefTRIAXone 2 gram injection Commonly known as:  ROCEPHIN  
2,000 mg by IntraMUSCular route once for 1 dose. colestipol 1 gram tablet Commonly known as:  COLESTID Take 1 g by mouth two (2) times a day. CULTURELLE PROBIOTICS 10 billion cell -200 mg Cpsp Generic drug:  Lactobac. rhamnosus GG-inulin ESTRACE 0.01 % (0.1 mg/gram) vaginal cream  
Generic drug:  estradiol  
  
 fludrocortisone 0.1 mg tablet Commonly known as:  FLORINEF Take 1 Tab by mouth daily. FLUoxetine 20 mg tablet Commonly known as:  PROzac Take 1 Tab by mouth nightly. furosemide 40 mg tablet Commonly known as:  LASIX Take 1 Tab by mouth daily. gabapentin 300 mg capsule Commonly known as:  NEURONTIN Take 1 Cap by mouth nightly. glucose blood VI test strips strip Commonly known as:  PRODIGY NO CODING Patient test glucose 3 times daily. ICD E11.9  
  
 hydrocortisone 5 mg tablet Commonly known as:  CORTEF  
15mg Qam, 5mg Qpm  
  
 insulin glargine 300 unit/mL (1.5 mL) Inpn Commonly known as:  TOUJEO SOLOSTAR  
50 Units by SubCUTAneous route nightly. levothyroxine 88 mcg tablet Commonly known as:  SYNTHROID Take 1 Tab by mouth Daily (before breakfast). Liraglutide 0.6 mg/0.1 mL (18 mg/3 mL) Pnij Commonly known as:  VICTOZA  
1.8mg daily  
  
 lisinopril 40 mg tablet Commonly known as:  Thersia Kubas Take 1 Tab by mouth every morning. meloxicam 15 mg tablet Commonly known as:  MOBIC Take 15 mg by mouth daily. metFORMIN  mg tablet Commonly known as:  GLUCOPHAGE XR Take 1 Tab by mouth two (2) times daily (with meals). nitrofurantoin (macrocrystal-monohydrate) 100 mg capsule Commonly known as:  MACROBID One BID WITH FOOD  Indications: BACTERIAL URINARY TRACT INFECTION  
  
 oxybutynin 5 mg tablet Commonly known as:  DITROPAN  
  
 pantoprazole 40 mg tablet Commonly known as:  PROTONIX Take 1 Tab by mouth Daily (before breakfast). Indications: GASTROESOPHAGEAL REFLUX pneumococcal 13 aashish conj dip 0.5 mL Syrg injection Commonly known as:  PREVNAR 13 (PF)  
0.5 mL by IntraMUSCular route once for 1 dose. trospium 20 mg tablet Commonly known as:  Nora Adame TAKE 1 TABLET BY MOUTH TWICE A DAY. Prescriptions Printed Refills  
 pneumococcal 13 aashish conj dip (PREVNAR 13, PF,) 0.5 mL syrg injection 0 Si.5 mL by IntraMUSCular route once for 1 dose. Class: Print Route: IntraMUSCular We Performed the Following AMB POC URINALYSIS DIP STICK AUTO W/O MICRO [71879 CPT(R)] CEFTRIAXONE SODIUM INJECTION  MG [ [facility]] Comments:  
 Mix per protocol CULTURE, URINE [id number] CPT(R)] WI THER/PROPH/DIAG INJECTION, SUBCUT/IM [id number] CPT(R)] Follow-up Instructions Return in about 1 week (around 10/18/2017) for follow-up. Patient Instructions RETURN TO CLINIC ON 10/12/17 AND 10/13/17 FOR SUBSEQUENT INJECTIONS OF ROCEPHIN Ceftriaxone (Rocephin, Novaplus cefTRIAXone, Amerinet Choice cefTRIAXone) - (By injection) Why this medicine is used:  
Treats infections. Contact a nurse or doctor right away if you have: · Blistering, peeling, red skin rash · Severe or bloody diarrhea · Loss of appetite, stomach pain, yellow skin or eyes · Dark urine or pale stools · Nausea, vomiting Common side effects: · Vaginal itching or discharge · Pain, redness, swelling where the needle is placed © 2017 [address] Information is for End User's use only and may not be sold, redistributed or otherwise used for commercial purposes. Urinary Tract Infection in Women: Care Instructions Your Care Instructions A urinary tract infection, or UTI, is a general term for an infection anywhere between the kidneys and the urethra (where urine comes out). Most UTIs are bladder infections. They often cause pain or burning when you urinate. UTIs are caused by bacteria and can be cured with antibiotics. Be sure to complete your treatment so that the infection goes away. Follow-up care is a key part of your treatment and safety. Be sure to make and go to all appointments, and call your doctor if you are having problems. It's also a good idea to know your test results and keep a list of the medicines you take. How can you care for yourself at home? · Take your antibiotics as directed. Do not stop taking them just because you feel better. You need to take the full course of antibiotics. · Drink extra water and other fluids for the next day or two.  This may help wash out the bacteria that are causing the infection. (If you have kidney, heart, or liver disease and have to limit fluids, talk with your doctor before you increase your fluid intake.) · Avoid drinks that are carbonated or have caffeine. They can irritate the bladder. · Urinate often. Try to empty your bladder each time. · To relieve pain, take a hot bath or lay a heating pad set on low over your lower belly or genital area. Never go to sleep with a heating pad in place. To prevent UTIs · Drink plenty of water each day. This helps you urinate often, which clears bacteria from your system. (If you have kidney, heart, or liver disease and have to limit fluids, talk with your doctor before you increase your fluid intake.) · Urinate when you need to. · Urinate right after you have sex. · Change sanitary pads often. · Avoid douches, bubble baths, feminine hygiene sprays, and other feminine hygiene products that have deodorants. · After going to the bathroom, wipe from front to back. When should you call for help? Call your doctor now or seek immediate medical care if: · Symptoms such as fever, chills, nausea, or vomiting get worse or appear for the first time. · You have new pain in your back just below your rib cage. This is called flank pain. · There is new blood or pus in your urine. · You have any problems with your antibiotic medicine. Watch closely for changes in your health, and be sure to contact your doctor if: 
· You are not getting better after taking an antibiotic for 2 days. · Your symptoms go away but then come back. Where can you learn more? Go to http://love-beth.info/. Enter O269 in the search box to learn more about \"Urinary Tract Infection in Women: Care Instructions. \" Current as of: November 28, 2016 Content Version: 11.3 © 6434-8196 [employer].  Care instructions adapted under license by [address] (which disclaims liability or warranty for this information). If you have questions about a medical condition or this instruction, always ask your healthcare professional. [address] any warranty or liability for your use of this information. Introducing [facility]! Dear Rusty Espinosa: Thank you for requesting a New Screens account. Our records indicate that you already have an active New Screens account. You can access your account anytime at [email or web address] Did you know that you can access your hospital and ER discharge instructions at any time in New Screens? You can also review all of your test results from your hospital stay or ER visit. Additional Information If you have questions, please visit the Frequently Asked Questions section of the New Screens website at [email or web address] Remember, New Screens is NOT to be used for urgent needs. For medical emergencies, dial 911. Now available from your iPhone and Android! Please provide this summary of care documentation to your next provider. Your primary care clinician is listed as Keegan Javier. If you have any questions after today's visit, please call [phone].

## 2017-10-11 NOTE — PROGRESS NOTES
Chief Complaint   Patient presents with    Urinary Frequency     Pt c/o urinary frequency that comes and goes. 1. Have you been to the ER, urgent care clinic since your last visit? Hospitalized since your last visit? No    2. Have you seen or consulted any other health care providers outside of the [address] [place] [address] since your last visit? Include any pap smears or colon screening.  No

## 2017-10-11 NOTE — PATIENT INSTRUCTIONS
RETURN TO CLINIC ON 10/12/17 AND 10/13/17 FOR SUBSEQUENT INJECTIONS OF ROCEPHIN       Ceftriaxone (Rocephin, Novaplus cefTRIAXone, Amerinet Choice cefTRIAXone) - (By injection)   Why this medicine is used:   Treats infections. Contact a nurse or doctor right away if you have:  · Blistering, peeling, red skin rash  · Severe or bloody diarrhea  · Loss of appetite, stomach pain, yellow skin or eyes  · Dark urine or pale stools  · Nausea, vomiting     Common side effects:  · Vaginal itching or discharge  · Pain, redness, swelling where the needle is placed  © 2017 [address] is for End User's use only and may not be sold, redistributed or otherwise used for commercial purposes. Urinary Tract Infection in Women: Care Instructions  Your Care Instructions    A urinary tract infection, or UTI, is a general term for an infection anywhere between the kidneys and the urethra (where urine comes out). Most UTIs are bladder infections. They often cause pain or burning when you urinate. UTIs are caused by bacteria and can be cured with antibiotics. Be sure to complete your treatment so that the infection goes away. Follow-up care is a key part of your treatment and safety. Be sure to make and go to all appointments, and call your doctor if you are having problems. It's also a good idea to know your test results and keep a list of the medicines you take. How can you care for yourself at home? · Take your antibiotics as directed. Do not stop taking them just because you feel better. You need to take the full course of antibiotics. · Drink extra water and other fluids for the next day or two. This may help wash out the bacteria that are causing the infection. (If you have kidney, heart, or liver disease and have to limit fluids, talk with your doctor before you increase your fluid intake.)  · Avoid drinks that are carbonated or have caffeine. They can irritate the bladder. · Urinate often. Try to empty your bladder each time. · To relieve pain, take a hot bath or lay a heating pad set on low over your lower belly or genital area. Never go to sleep with a heating pad in place. To prevent UTIs  · Drink plenty of water each day. This helps you urinate often, which clears bacteria from your system. (If you have kidney, heart, or liver disease and have to limit fluids, talk with your doctor before you increase your fluid intake.)  · Urinate when you need to. · Urinate right after you have sex. · Change sanitary pads often. · Avoid douches, bubble baths, feminine hygiene sprays, and other feminine hygiene products that have deodorants. · After going to the bathroom, wipe from front to back. When should you call for help? Call your doctor now or seek immediate medical care if:  · Symptoms such as fever, chills, nausea, or vomiting get worse or appear for the first time. · You have new pain in your back just below your rib cage. This is called flank pain. · There is new blood or pus in your urine. · You have any problems with your antibiotic medicine. Watch closely for changes in your health, and be sure to contact your doctor if:  · You are not getting better after taking an antibiotic for 2 days. · Your symptoms go away but then come back. Where can you learn more? Go to http://love-beth.info/. Enter P767 in the search box to learn more about \"Urinary Tract Infection in Women: Care Instructions. \"  Current as of: November 28, 2016  Content Version: 11.3  © 1302-6294 [employer]. Care instructions adapted under license by [employer] (which disclaims liability or warranty for this information). If you have questions about a medical condition or this instruction, always ask your healthcare professional. [address] any warranty or liability for your use of this information.

## 2017-10-12 ENCOUNTER — CLINICAL SUPPORT (OUTPATIENT)
Dept: INTERNAL MEDICINE CLINIC | Age: 77
End: 2017-10-12

## 2017-10-12 DIAGNOSIS — N30.01 ACUTE CYSTITIS WITH HEMATURIA: Primary | ICD-10-CM

## 2017-10-12 NOTE — PROGRESS NOTES
Patient in office today for second Rocephin injection per Freda Mccarty, NP.  2.1 mL of Lidocine 1% with epinephrine drawn up and mixed with Ceftriaxone 1g. Medication as 2.8mL of Rocephin, verified by Dr. Kana Stevenson prior to administering. Patient consents to administration of antibiotic today and given in Right glute without difficulty. Patient tolerated well. Patient was observed for 10 minutes after administration with no complications.     LOT: [id number]  EXP: 9/1/19  : Bret Whalen  NDC: [id number]  SITE: RIGHT GLUTE  ROUTE: INTRAMUSCULAR

## 2017-10-13 ENCOUNTER — CLINICAL SUPPORT (OUTPATIENT)
Dept: INTERNAL MEDICINE CLINIC | Age: 77
End: 2017-10-13

## 2017-10-13 DIAGNOSIS — N30.01 ACUTE CYSTITIS WITH HEMATURIA: Primary | ICD-10-CM

## 2017-10-13 RX ORDER — CEFTRIAXONE 1 G/1
1 INJECTION, POWDER, FOR SOLUTION INTRAMUSCULAR; INTRAVENOUS ONCE
Qty: 1 VIAL | Refills: 0
Start: 2017-10-13 | End: 2017-10-13

## 2017-10-13 NOTE — PROGRESS NOTES
Alex Bermudez is a 68 y.o. female who presents for third Ceftriaxone injection. She denies any symptoms , reactions or allergies that would exclude her from being injected today. 2.ml of Lidocaine 2% with epinephrine drawn up and mixed with 1 gram of Ceftriaxone. Medication mixed 2.8 ml of Ceftriaxone. Verified per NP Zhane Dobson. Medication given in left gluteus. Patient tolerated well. Risks and adverse reactions were discussed. All questions were addressed. She was observed for 10 min post injection. There were no reactions observed.     Cetriaxone 1 gram  Lot [id number]  Exp 1 Sep 2019  [address] [id number]  DEPARTMENT OF [facility] for [address] LPN

## 2017-10-14 LAB — BACTERIA UR CULT: ABNORMAL

## 2017-10-18 ENCOUNTER — HOSPITAL ENCOUNTER (OUTPATIENT)
Dept: LAB | Age: 77
Discharge: HOME | End: 2017-10-18
Payer: MEDICARE

## 2017-10-18 ENCOUNTER — OFFICE VISIT (OUTPATIENT)
Dept: INTERNAL MEDICINE CLINIC | Age: 77
End: 2017-10-18

## 2017-10-18 VITALS
HEART RATE: 67 BPM | OXYGEN SATURATION: 97 % | RESPIRATION RATE: 16 BRPM | SYSTOLIC BLOOD PRESSURE: 110 MMHG | DIASTOLIC BLOOD PRESSURE: 60 MMHG | WEIGHT: 178 LBS | HEIGHT: 62 IN | TEMPERATURE: 97 F | BODY MASS INDEX: 32.76 KG/M2

## 2017-10-18 DIAGNOSIS — Z23 ENCOUNTER FOR IMMUNIZATION: ICD-10-CM

## 2017-10-18 DIAGNOSIS — R39.15 URGENCY OF URINATION: Primary | ICD-10-CM

## 2017-10-18 DIAGNOSIS — N39.0 RECURRENT UTI: ICD-10-CM

## 2017-10-18 PROCEDURE — 87088 URINE BACTERIA CULTURE: CPT

## 2017-10-18 PROCEDURE — 87086 URINE CULTURE/COLONY COUNT: CPT

## 2017-10-18 PROCEDURE — 36415 COLL VENOUS BLD VENIPUNCTURE: CPT

## 2017-10-18 NOTE — MR AVS SNAPSHOT
Visit Information Date & Time Provider Department Dept. Phone Encounter #  
 10/18/2017  9:45 AM CONCHA Paul [facility] [phone] Your Appointments 12/4/2017  9:30 AM  
ROUTINE CARE with MD Donovan Jaramillo [facility] End[facility]in[facility] [address]) Appt Note: 4m f/u  
 [address] [facility] [address]  
[address] [address]  
  
    
 12/8/2017 10:00 AM  
ROUTINE CARE with MD Mckinley Marin [facility] [address]) Appt Note: 4mF/U AI and DM.; 4mF/U AI and DM.  
 [address] [place] [address]  
[address], [facility] [address] [address] Upcoming Health Maintenance Date Due ZOSTER VACCINE AGE 60> 4/29/2000 MICROALBUMIN Q1 2/11/2017 LIPID PANEL Q1 10/13/2017 INFLUENZA AGE 9 TO ADULT 10/27/2017* FOOT EXAM Q1 12/19/2017 HEMOGLOBIN A1C Q6M 2/4/2018 MEDICARE YEARLY EXAM 4/19/2018 EYE EXAM RETINAL OR DILATED Q1 9/20/2018 GLAUCOMA SCREENING Q2Y 10/12/2018 DTaP/Tdap/Td series (2 - Td) 3/30/2020 *Topic was postponed. The date shown is not the original due date. Allergies as of 10/18/2017  Review Complete On: 10/18/2017 By: Valorie Herron NP Severity Noted Reaction Type Reactions Amoxicillin  10/13/2016    Unknown (comments) Erythromycin  04/22/2011    Rash, Unknown (comments) fever Current Immunizations  Reviewed on 4/2/2016 Name Date DTaP 3/30/2010 Influenza Vaccine 10/30/2015 Influenza Vaccine Whole 10/15/2010 ZZZ-RETIRED (DO NOT USE) Pneumococcal Vaccine (Unspecified Type) 10/15/2009 Not reviewed this visit You Were Diagnosed With   
  
 Codes Comments Urgency of urination    -  Primary ICD-10-CM: R39.15 ICD-9-CM: 590.14 Recurrent UTI     ICD-10-CM: N39.0 ICD-9-CM: 599.0 Vitals BP Pulse Temp Resp Height(growth percentile) Weight(growth percentile) 110/60 (BP 1 Location: Left arm, BP Patient Position: Sitting) 67 97 °F (36.1 °C) (Oral) 16 5' 2\" (1.575 m) 178 lb (80.7 kg) SpO2 BMI OB Status Smoking Status 97% 32.56 kg/m2 Postmenopausal Never Smoker Vitals History BMI and BSA Data Body Mass Index Body Surface Area 32.56 kg/m 2 1.88 m 2 Preferred Pharmacy Pharmacy Name Phone [facility] [phone] Your Updated Medication List  
  
   
This list is accurate as of: 10/18/17 10:16 AM.  Always use your most recent med list. amLODIPine 5 mg tablet Commonly known as:  [place] TAKE 1 TABLET BY MOUTH DAILY. ascorbic acid (vitamin C) 500 mg tablet Commonly known as:  VITAMIN C Take 1 Tab by mouth daily. aspirin 81 mg chewable tablet Take 1 Tab by mouth daily. atorvastatin 80 mg tablet Commonly known as:  LIPITOR Take 1 Tab by mouth nightly. colestipol 1 gram tablet Commonly known as:  COLESTID Take 1 g by mouth two (2) times a day. CULTURELLE PROBIOTICS 10 billion cell -200 mg Cpsp Generic drug:  Lactobac. rhamnosus GG-inulin ESTRACE 0.01 % (0.1 mg/gram) vaginal cream  
Generic drug:  estradiol  
  
 fludrocortisone 0.1 mg tablet Commonly known as:  FLORINEF Take 1 Tab by mouth daily. FLUoxetine 20 mg tablet Commonly known as:  PROzac Take 1 Tab by mouth nightly. furosemide 40 mg tablet Commonly known as:  LASIX Take 1 Tab by mouth daily. gabapentin 300 mg capsule Commonly known as:  NEURONTIN Take 1 Cap by mouth nightly. glucose blood VI test strips strip Commonly known as:  PRODIGY NO CODING Patient test glucose 3 times daily. ICD E11.9  
  
 hydrocortisone 5 mg tablet Commonly known as:  CORTEF  
15mg Qam, 5mg Qpm  
  
 insulin glargine 300 unit/mL (1.5 mL) Inpn Commonly known as:  TOUJEO SOLOSTAR  
50 Units by SubCUTAneous route nightly. levothyroxine 88 mcg tablet Commonly known as:  SYNTHROID Take 1 Tab by mouth Daily (before breakfast). Liraglutide 0.6 mg/0.1 mL (18 mg/3 mL) Pnij Commonly known as:  VICTOZA  
1.8mg daily  
  
 lisinopril 40 mg tablet Commonly known as:  Marge Jenae Take 1 Tab by mouth every morning. meloxicam 15 mg tablet Commonly known as:  MOBIC Take 15 mg by mouth daily. metFORMIN  mg tablet Commonly known as:  GLUCOPHAGE XR Take 1 Tab by mouth two (2) times daily (with meals). nitrofurantoin (macrocrystal-monohydrate) 100 mg capsule Commonly known as:  MACROBID One BID WITH FOOD  Indications: BACTERIAL URINARY TRACT INFECTION  
  
 oxybutynin 5 mg tablet Commonly known as:  DITROPAN  
  
 pantoprazole 40 mg tablet Commonly known as:  PROTONIX Take 1 Tab by mouth Daily (before breakfast). Indications: GASTROESOPHAGEAL REFLUX  
  
 trospium 20 mg tablet Commonly known as:  [place] TAKE 1 TABLET BY MOUTH TWICE A DAY. We Performed the Following AMB POC URINALYSIS DIP STICK AUTO W/O MICRO [72743 CPT(R)] CULTURE, URINE [id number] CPT(R)] REFERRAL TO UROLOGY [[id number] [facility]] Comments:  
 Please evaluate patient for recurrent/resistant UTI. Referral Information Referral ID Referred By Referred To  
  
 [id number] Renetta BELL MD   
   [address] [facility] [place], [address] [place] [address] Pkwy Phone: [phone] Fax: [phone] Visits Status Start Date End Date 1 New Request 10/18/17 10/18/18 If your referral has a status of pending review or denied, additional information will be sent to support the outcome of this decision. Patient Instructions Follow-up with Urology-Dr. Lamont Pak on 10/27/17 at 9:00am at [facility]!    
 Dear Hiro Man: 
 Thank you for requesting a Voxeet account. Our records indicate that you already have an active Voxeet account. You can access your account anytime at [email or web address] Did you know that you can access your hospital and ER discharge instructions at any time in Voxeet? You can also review all of your test results from your hospital stay or ER visit. Additional Information If you have questions, please visit the Frequently Asked Questions section of the Voxeet website at [email or web address] Remember, Voxeet is NOT to be used for urgent needs. For medical emergencies, dial 911. Now available from your iPhone and Android! Please provide this summary of care documentation to your next provider. Your primary care clinician is listed as Roe Marcelino. If you have any questions after today's visit, please call [phone].

## 2017-10-18 NOTE — PROGRESS NOTES
HISTORY OF PRESENT ILLNESS  Santino Whyte is a 68 y.o. female. Patient reports for follow-up of recent UTI. Urine culture showed e.coli, which was resistant to most antibiotics. Patient completed 3 shots of Rocephin last week(5 days ago). Patient is still complaining of urinary urgency and frequency. Blood sugars have been running in 200s. Denies fever, chills. Visit Vitals    /60 (BP 1 Location: Left arm, BP Patient Position: Sitting)    Pulse 67    Temp 97 °F (36.1 °C) (Oral)    Resp 16    Ht 5' 2\" (1.575 m)    Wt 178 lb (80.7 kg)    SpO2 97%    BMI 32.56 kg/m2     Past Medical History:   Diagnosis Date    Arthritis osteoporosis    Cataract     Diabetes [facility] [address]     Endocrine disease 1972    adrenal insufficiency - bilat adrenalectomy    GERD (gastroesophageal reflux disease)     H/O Cushing disease     Hypertension     Other ill-defined conditions(799.89)     elizabeth's disease    Stroke ([facility] 2016    TIA 2011 & L occipial stroke 4/2016    Thyroid disease hypothyroid    UTI (lower urinary tract infection)      Current Outpatient Prescriptions on File Prior to Visit   Medication Sig Dispense Refill    nitrofurantoin, macrocrystal-monohydrate, (MACROBID) 100 mg capsule One BID WITH FOOD  Indications: BACTERIAL URINARY TRACT INFECTION 10 Cap 0    levothyroxine (SYNTHROID) 88 mcg tablet Take 1 Tab by mouth Daily (before breakfast). 30 Tab 5    insulin glargine (TOUJEO SOLOSTAR) 300 unit/mL (1.5 mL) inpn 50 Units by SubCUTAneous route nightly. 9 Adjustable Dose Pre-filled Pen Syringe 3    metFORMIN ER (GLUCOPHAGE XR) 500 mg tablet Take 1 Tab by mouth two (2) times daily (with meals). 180 Tab 3    Liraglutide (VICTOZA) 0.6 mg/0.1 mL (18 mg/3 mL) sub-q pen 1.8mg daily 9 mL 5    amLODIPine (NORVASC) 5 mg tablet TAKE 1 TABLET BY MOUTH DAILY. 30 Tab 3    furosemide (LASIX) 40 mg tablet Take 1 Tab by mouth daily.  90 Tab 3    trospium (SANCTURA) 20 mg tablet TAKE 1 TABLET BY MOUTH TWICE A DAY.  3    CULTURELLE PROBIOTICS 10 billion cell -200 mg cpSP   99    ESTRACE 0.01 % (0.1 mg/gram) vaginal cream   3    oxybutynin (DITROPAN) 5 mg tablet   2    glucose blood VI test strips (PRODIGY NO CODING) strip Patient test glucose 3 times daily. ICD E11.9 150 Strip 1    lisinopril (PRINIVIL, ZESTRIL) 40 mg tablet Take 1 Tab by mouth every morning. 90 Tab 3    ascorbic acid, vitamin C, (VITAMIN C) 500 mg tablet Take 1 Tab by mouth daily. 100 Tab 3    aspirin 81 mg chewable tablet Take 1 Tab by mouth daily. 100 Tab 3    atorvastatin (LIPITOR) 80 mg tablet Take 1 Tab by mouth nightly. 90 Tab 3    FLUoxetine (PROZAC) 20 mg tablet Take 1 Tab by mouth nightly. 90 Tab 3    gabapentin (NEURONTIN) 300 mg capsule Take 1 Cap by mouth nightly. 90 Cap 3    fludrocortisone (FLORINEF) 0.1 mg tablet Take 1 Tab by mouth daily. 90 Tab 3    meloxicam (MOBIC) 15 mg tablet Take 15 mg by mouth daily.  hydrocortisone (CORTEF) 5 mg tablet 15mg Qam, 5mg Qpm 120 Tab 11    pantoprazole (PROTONIX) 40 mg tablet Take 1 Tab by mouth Daily (before breakfast). Indications: GASTROESOPHAGEAL REFLUX 30 Tab 1    colestipol (COLESTID) 1 gram tablet Take 1 g by mouth two (2) times a day. No current facility-administered medications on file prior to visit. Bladder Infection    The history is provided by the patient. This is a recurrent problem. Episode onset: 1 month ago. The problem occurs every urination. The problem has been gradually improving. There has been no fever. Associated symptoms include frequency and urgency. Treatments tried: rocephin injections x 3 last week. The treatment provided moderate relief. Her past medical history is significant for recurrent UTIs. Immunization/Injection         Review of Systems   Genitourinary: Positive for frequency and urgency. Physical Exam   Constitutional: She is oriented to person, place, and time. She appears well-developed and well-nourished.    Neurological: She is alert and oriented to person, place, and time. Skin: Skin is warm and dry. Psychiatric: She has a normal mood and affect. ASSESSMENT and PLAN    ICD-10-CM ICD-9-CM    1. Urgency of urination R39.15 788.63 CULTURE, URINE      AMB POC URINALYSIS DIP STICK AUTO W/O MICRO   2.  Recurrent UTI N39.0 599.0 REFERRAL TO UROLOGY   3. Encounter for immunization Z23 V03.89 INFLUENZA VIRUS VACCINE, HIGH DOSE SEASONAL, PRESERVATIVE FREE      ID IMMUNIZ ADMIN,1 SINGLE/COMB VAC/TOXOID     Orders Placed This Encounter    ID IMMUNIZ ADMIN,1 SINGLE/COMB VAC/TOXOID    CULTURE, URINE    Influenza virus vaccine (FLUZONE HIGH DOSE) 65 years and older (36993)    REFERRAL TO UROLOGY    AMB POC URINALYSIS DIP STICK AUTO W/O MICRO   consulted with Dr. Lras Islas regarding plan of Rafael El agrees patient may proceed with surgery tomorrow regardless of current UTI status  Appointment scheduled with urology on 10/27/17 for follow-up  Culture sent  Monitor blood sugars closely

## 2017-10-19 ENCOUNTER — HOSPITAL ENCOUNTER (OUTPATIENT)
Dept: LAB | Age: 77
Discharge: HOME | End: 2017-10-19

## 2017-10-20 LAB
BACTERIA UR CULT: NO GROWTH
BACTERIA UR CULT: NO GROWTH

## 2017-10-23 ENCOUNTER — TELEPHONE (OUTPATIENT)
Dept: INTERNAL MEDICINE CLINIC | Age: 77
End: 2017-10-23

## 2017-10-24 ENCOUNTER — TELEPHONE (OUTPATIENT)
Dept: INTERNAL MEDICINE CLINIC | Age: 77
End: 2017-10-24

## 2017-10-31 ENCOUNTER — TELEPHONE (OUTPATIENT)
Dept: INTERNAL MEDICINE CLINIC | Age: 77
End: 2017-10-31

## 2017-10-31 NOTE — TELEPHONE ENCOUNTER
Iveth Resendez 1940     Pt's care giver Jose Montenegroper called stating at up coming visit 12/8/17 they would like patient tested for dementia. Jose Del Valle states that pt has wonder off at [facility], she hs gone on a road trip with other people from the facility and couldn't remember her way back home. Jose Del Valle also stated that patient has gone to the bank and won't remember why she went there etc. Jose Del Valle wanted this notated so that she does not know how to or want to bring up all of these occurrences with patient in the room.

## 2017-11-02 NOTE — TELEPHONE ENCOUNTER
OK to refer to Dr Mariangel Ziegler for testing. Dr Crystal Vieira was from care giver, should I call the patient and advise her that you would like her to make an appointment with specialist? Would you like to discuss with patient at upcoming appointment?

## 2017-11-12 RX ORDER — AMLODIPINE BESYLATE 5 MG/1
TABLET ORAL
Qty: 30 TAB | Refills: 3 | Status: SHIPPED | OUTPATIENT
Start: 2017-11-12 | End: 2018-03-22 | Stop reason: SDUPTHER

## 2017-12-04 ENCOUNTER — OFFICE VISIT (OUTPATIENT)
Dept: ENDOCRINOLOGY | Age: 77
End: 2017-12-04

## 2017-12-04 VITALS
RESPIRATION RATE: 17 BRPM | HEART RATE: 70 BPM | OXYGEN SATURATION: 96 % | SYSTOLIC BLOOD PRESSURE: 112 MMHG | DIASTOLIC BLOOD PRESSURE: 68 MMHG | HEIGHT: 62 IN | BODY MASS INDEX: 32.39 KG/M2 | WEIGHT: 176 LBS

## 2017-12-04 DIAGNOSIS — N18.30 TYPE 2 DIABETES MELLITUS WITH STAGE 3 CHRONIC KIDNEY DISEASE, WITH LONG-TERM CURRENT USE OF INSULIN (HCC): Primary | ICD-10-CM

## 2017-12-04 DIAGNOSIS — Z79.4 TYPE 2 DIABETES MELLITUS WITH STAGE 3 CHRONIC KIDNEY DISEASE, WITH LONG-TERM CURRENT USE OF INSULIN (HCC): Primary | ICD-10-CM

## 2017-12-04 DIAGNOSIS — E27.40 ADRENAL INSUFFICIENCY (HCC): Chronic | ICD-10-CM

## 2017-12-04 DIAGNOSIS — E03.4 HYPOTHYROIDISM DUE TO ACQUIRED ATROPHY OF THYROID: Chronic | ICD-10-CM

## 2017-12-04 DIAGNOSIS — E11.22 TYPE 2 DIABETES MELLITUS WITH STAGE 3 CHRONIC KIDNEY DISEASE, WITH LONG-TERM CURRENT USE OF INSULIN (HCC): Primary | ICD-10-CM

## 2017-12-04 NOTE — PROGRESS NOTES
Lab Results   Component Value Date/Time    Hemoglobin A1c 8.0 08/04/2017 11:25 AM    Hemoglobin A1c 9.6 05/19/2017 11:16 AM    Hemoglobin A1c 8.3 12/19/2016 12:53 PM     Pt nor her caregiver was able to verify pt's medications.  Currently waiting on pt's pharmacy to fax over pt's med list

## 2017-12-04 NOTE — PROGRESS NOTES
Endocrinology Visit    Chief Complaint: Type 2 diabetes, adrenal insufficiency, hypothyroidism    History of Present Illness: Carolyn العلي is a 68 y.o. female who returns for f/u of adrenal insufficiency, hypothyroidism, and type 2 diabetes mellitus. Records from her previous endocrinologist Dr Yessica Terrell indicate pt had pituitary Cushings - treatment of which involved XRT (presumably to her pituitary) and BL adrenalectomy. She subsequently developed primary AI and is on hydrocortisone and fludrocortisone replacement. I saw her in initial consultation in December 2016 at which time I made several adjustments to her medication regimen as detailed by problem below. In May, we decided to try switching Novolog to Victoza + metformin to help improve her blood sugar control (A1c was 9.6%) and weight. She has lost 16 lbs since and A1c had decreased to 8.0% at her last visit in August.     Regarding type 2 diabetes, current glycemic medication regimen is Victoza 1.8mg daily, metformin XR 500mg BID, and Toujeo 50 units Qam. She has a caretaker who helps with her medication administration. Home blood glucose monitoring frequency: 2-3 times/day  Glucose range at home: 86-300s. She has not had hypoglycemia since I reduced her Toujeo from 60 to 50 units at her last visit. However she does have hyperglycemia to 300s, typically trends up as the day progresses. Known complications include CKD. Last eye exam was over a year ago. She does exercise, walks every day. Her food is provided at her [facility]. She does not necessarily try to restrict dietary carbohydrate intake - admits she has been eating more cookies and desserts recently. When she goes to the grocery store with her caretaker she gets several bags of cookies in addition to ice cream, cheese crackers, and chips. Snacks on these between meals. She also has a history of hypothyroidism and is currently on generic levothyroxine 88 mcg daily.  This has been reduced gradually from 125 to 112 to 100, now 88 (decreased at her last visit 3 months ago due to low TSH). 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 She takes her medication first thing in the morning on an empty stomach and waits at least 30 minutes before eating or taking her other meds. She does umana insomnia (no trouble falling asleep, just wakes up around midnight every night). Denies palpitations, tremors, changes in bowel habits or energy level. Regarding her AI, she currently takes HC 15mg Qam, 5mg Qpm. I reduced her morning dose from 20mg to 15mg. She is also taking fludrocortisone 0.1 mg daily.  At her last visit, her sodium, potassium, and renin levels were normal.     Review of Systems as above, otherwise a 7 pt review is negative    Problem List:  Patient Active Problem List   Diagnosis Code    TIA (transient ischemic attack) G45.9    Essential hypertension, benign I10    Recurrent UTI N39.0    RLS (restless legs syndrome) G25.81    Esophageal reflux K21.9    Cushing disease ([facility] [address] E24.0    Depression F32.9    Elevated cholesterol E78.00    Vitamin D deficiency E55.9    Adrenal insufficiency (HCC) E27.40    Hypothyroidism E03.9    Intracranial vascular stenosis I67.9    Meningioma ([facility] [address] D32.9    Cerebral infarction due to stenosis of left posterior cerebral artery ([facility]) N97.738    Unstable angina ([facility]) I20.0    Controlled type 2 diabetes mellitus with stage 3 chronic kidney disease, with long-term current use of insulin ([facility]) E11.22, N18.3, Z79.4       Past Medical History:    Past Medical History:   Diagnosis Date    Arthritis osteoporosis    Cataract     Diabetes ([facility] [address]     Endocrine disease 1972    adrenal insufficiency - bilat adrenalectomy    GERD (gastroesophageal reflux disease)     H/O Cushing disease     Hypertension     Other ill-defined conditions(799.89)     elizabeth's disease    Stroke [facility]) 2016    TIA 2011 & L occipial stroke 4/2016    Thyroid disease hypothyroid    UTI (lower urinary tract infection)        Past Surgical History:  Past Surgical History:   Procedure Laterality Date    ENDOCRINE SURGERY PROC UNLISTED      bilat adrenalectomy in 1972    HX APPENDECTOMY      HX CATARACT REMOVAL      ON the left    HX CHOLECYSTECTOMY      HX CHOLECYSTECTOMY  2000    HX HYSTERECTOMY      HX OTHER SURGICAL  1972    adrenal gland removed 1970s    HX TONSILLECTOMY         Social History:  Social History     Social History    Marital status:      Spouse name: N/A    Number of children: N/A    Years of education: N/A     Occupational History    Not on file. Social History Main Topics    Smoking status: Never Smoker    Smokeless tobacco: Never Used    Alcohol use No    Drug use: No    Sexual activity: Not Currently     Other Topics Concern    Not on file     Social History Narrative       Family History:  Family History   Problem Relation Age of Onset    Cancer Mother     Diabetes Mother     Stroke Mother     Psychiatric Disorder Mother     Dementia Mother     Headache Mother     Heart Disease Father     Psychiatric Disorder Father     Stroke Father     Asthma Sister     Diabetes Sister     Asthma Brother     Heart Disease Brother        Medications:     Current Outpatient Prescriptions:     PRODIGY NO CODING strip, TEST BLOOD SUGAR 3 TIMES DAILY. (ICD E11.9), Disp: 150 Strip, Rfl: 3    amLODIPine (NORVASC) 5 mg tablet, TAKE 1 TABLET BY MOUTH DAILY. , Disp: 30 Tab, Rfl: 3    nitrofurantoin, macrocrystal-monohydrate, (MACROBID) 100 mg capsule, One BID WITH FOOD  Indications: BACTERIAL URINARY TRACT INFECTION, Disp: 10 Cap, Rfl: 0    levothyroxine (SYNTHROID) 88 mcg tablet, Take 1 Tab by mouth Daily (before breakfast). , Disp: 30 Tab, Rfl: 5    insulin glargine (TOUJEO SOLOSTAR) 300 unit/mL (1.5 mL) inpn, 50 Units by SubCUTAneous route nightly., Disp: 9 Adjustable Dose Pre-filled Pen Syringe, Rfl: 3    metFORMIN ER (GLUCOPHAGE XR) 500 mg tablet, Take 1 Tab by mouth two (2) times daily (with meals). , Disp: 180 Tab, Rfl: 3    Liraglutide (VICTOZA) 0.6 mg/0.1 mL (18 mg/3 mL) sub-q pen, 1.8mg daily, Disp: 9 mL, Rfl: 5    furosemide (LASIX) 40 mg tablet, Take 1 Tab by mouth daily. , Disp: 90 Tab, Rfl: 3    trospium (SANCTURA) 20 mg tablet, TAKE 1 TABLET BY MOUTH TWICE A DAY., Disp: , Rfl: 3    CULTURELLE PROBIOTICS 10 billion cell -200 mg cpSP, , Disp: , Rfl: 99    ESTRACE 0.01 % (0.1 mg/gram) vaginal cream, , Disp: , Rfl: 3    oxybutynin (DITROPAN) 5 mg tablet, , Disp: , Rfl: 2    glucose blood VI test strips (PRODIGY NO CODING) strip, Patient test glucose 3 times daily. ICD E11.9, Disp: 150 Strip, Rfl: 1    lisinopril (PRINIVIL, ZESTRIL) 40 mg tablet, Take 1 Tab by mouth every morning., Disp: 90 Tab, Rfl: 3    ascorbic acid, vitamin C, (VITAMIN C) 500 mg tablet, Take 1 Tab by mouth daily. , Disp: 100 Tab, Rfl: 3    aspirin 81 mg chewable tablet, Take 1 Tab by mouth daily. , Disp: 100 Tab, Rfl: 3    atorvastatin (LIPITOR) 80 mg tablet, Take 1 Tab by mouth nightly., Disp: 90 Tab, Rfl: 3    FLUoxetine (PROZAC) 20 mg tablet, Take 1 Tab by mouth nightly., Disp: 90 Tab, Rfl: 3    gabapentin (NEURONTIN) 300 mg capsule, Take 1 Cap by mouth nightly., Disp: 90 Cap, Rfl: 3    fludrocortisone (FLORINEF) 0.1 mg tablet, Take 1 Tab by mouth daily. , Disp: 90 Tab, Rfl: 3    meloxicam (MOBIC) 15 mg tablet, Take 15 mg by mouth daily. , Disp: , Rfl:     hydrocortisone (CORTEF) 5 mg tablet, 15mg Qam, 5mg Qpm, Disp: 120 Tab, Rfl: 11    pantoprazole (PROTONIX) 40 mg tablet, Take 1 Tab by mouth Daily (before breakfast). Indications: GASTROESOPHAGEAL REFLUX, Disp: 30 Tab, Rfl: 1    colestipol (COLESTID) 1 gram tablet, Take 1 g by mouth two (2) times a day., Disp: , Rfl:     Allergies:   Allergies   Allergen Reactions    Amoxicillin Unknown (comments)    Erythromycin Rash and Unknown (comments)     fever       Physical Examination:  Blood pressure 112/68, pulse 70, resp. rate 17, height 5' 2\" (1.575 m), weight 176 lb (79.8 kg), SpO2 96 %. Gen: elderly female in no acute distress  HEENT: mucous membranes moist  Thyroid: no enlargement or nodules noted  CAD: normal rate, regular rhythm  PULM: unlabored respirations  EXT: no clubbing, cyanosis or edema  Neuro: grossly non focal, reflexes are brisk but no tremor of outstretched hands  Psych: pleasant, fair insight into medical hx  Skin: warm, dry    Clinical Data Review:  Lab Results   Component Value Date/Time    Hemoglobin A1c 8.0 08/04/2017 11:25 AM     Lab Results   Component Value Date/Time    Sodium 141 08/04/2017 11:25 AM    Potassium 4.6 08/04/2017 11:25 AM    Chloride 100 08/04/2017 11:25 AM    CO2 26 08/04/2017 11:25 AM    Anion gap 7 02/03/2017 01:55 PM    Glucose 89 08/04/2017 11:25 AM    BUN 19 08/04/2017 11:25 AM    Creatinine 0.98 08/04/2017 11:25 AM    BUN/Creatinine ratio 19 08/04/2017 11:25 AM    GFR est AA 64 08/04/2017 11:25 AM    GFR est non-AA 56 08/04/2017 11:25 AM    Calcium 9.5 08/04/2017 11:25 AM     No results found for: MCACR, MCA1, MCA2, MCA3, MCAU, MCAU2, MCALPOCT  Lab Results   Component Value Date/Time    Cholesterol, total 235 10/13/2016 12:13 PM    HDL Cholesterol 39 10/13/2016 12:13 PM    LDL, calculated 116.8 10/13/2016 12:13 PM    VLDL, calculated 79.2 10/13/2016 12:13 PM    Triglyceride 396 10/13/2016 12:13 PM    CHOL/HDL Ratio 6.0 10/13/2016 12:13 PM     Lab Results   Component Value Date/Time    Hemoglobin A1c 8.0 08/04/2017 11:25 AM     Lab Results   Component Value Date/Time    TSH 0.054 08/04/2017 11:25 AM    T4, Free 1.34 08/04/2017 11:25 AM    T4, Total 10.1 04/23/2011 04:07 AM      Assessment and Plan:  Patient is a 68 y.o. female here for type 2 diabetes, previously suboptimally controlled on multiple daily insulin injection regimen.  A1c improved after switching mealtime insulin to GLP1a (9.6 --> 8.0%) however she has had hyperglycemia intermittently in the past few months presumably from high carbohydrate food choices. I discussed possibly resuming low dose mealtime insulin but would prefer that she modify her diet rather than adding a medication. Will give her 3 months to achieve a goal A1c 7.5 to 8%. Should she fail to reach this goal, will resume mealtime insulin in addition to her current agents. 1. Type 2 diabetes mellitus with stage 3 chronic kidney disease, with long-term current use of insulin (HCC)     Glycemic Medication Changes:  - continue Victoza 1.8mg daily  - continue metformin XR 500mg BID  - continue Toujeo 50 units Qam  - provided extensive counseling on dietary carbohydrate reduction    DM Health Maintenance: pertinent items updated in HM tab  A1c: update today  Cv/Lipids: on atorvastatin  BP/Renal: BP close to goal, on ACEi, microalbumin: update today  Vaccines: per PCP  Podiatry: no active issues, encouraged well-fitting footwear and daily inspection  Neuro: + neuropathy, foot exam UTD  Ophtho: yearly eye exam recommended  Diet and exercise: discussed healthy eating and exercise recommendations, particularly reduction in dietary carbohydrates as above     2. Adrenal insufficiency ([address]): primary s/p BL adrenalectomy. Continue HC 15mg Qam, 5mg Qpm to work toward more physiologic dose of 10/5 (she is hesitant to decrease this today). Recent sodium/potassium levels were normal - continue fludrocortisone. Check BMP today. 3. Acquired hypothyroidism: euthyroid on exam after recent LT4 dose adjustment (decrease from 100 to 88). Check TFTs to confirm this today. 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 I spent 25 minutes with the patient today and > 50% of the time was spent counseling the patient about thyroid and adrenal medication management, also diabetes management including medication options and dietary modification. Patient verbalized an understanding and will return to clinic in 3 months. Thank you for the opportunity to participate in this patient's care.     Beatrice Evans MD  [facility]  [facility]

## 2017-12-04 NOTE — MR AVS SNAPSHOT
Visit Information Date & Time Provider Department Dept. Phone Encounter #  
 12/4/2017  9:30 AM Layne Moss, 1024 [facility] Endocrinology [phone] [id number] Follow-up Instructions Return in about 3 months (around 3/4/2018). Your Appointments 1/2/2018 10:00 AM  
ROUTINE CARE with MD Mckinley Terrell [facility]) Appt Note: l  
 [address], [address] [place] [address]  
[address], [address] [address] Upcoming Health Maintenance Date Due ZOSTER VACCINE AGE 60> 4/29/2000 MICROALBUMIN Q1 2/11/2017 LIPID PANEL Q1 10/13/2017 FOOT EXAM Q1 12/19/2017 HEMOGLOBIN A1C Q6M 2/4/2018 MEDICARE YEARLY EXAM 4/19/2018 EYE EXAM RETINAL OR DILATED Q1 9/20/2018 GLAUCOMA SCREENING Q2Y 10/12/2018 DTaP/Tdap/Td series (2 - Td) 3/30/2020 Allergies as of 12/4/2017  Review Complete On: 12/4/2017 By: Nicole Hallman Severity Noted Reaction Type Reactions Amoxicillin  10/13/2016    Unknown (comments) Erythromycin  04/22/2011    Rash, Unknown (comments) fever Current Immunizations  Reviewed on 10/18/2017 Name Date DTaP 3/30/2010 Influenza High Dose Vaccine PF 10/18/2017 Influenza Vaccine 10/30/2015 Influenza Vaccine Whole 10/15/2010 ZZZ-RETIRED (DO NOT USE) Pneumococcal Vaccine (Unspecified Type) 10/15/2009 Not reviewed this visit You Were Diagnosed With   
  
 Codes Comments Type 2 diabetes mellitus with stage 3 chronic kidney disease, with long-term current use of insulin (HCC)    -  Primary ICD-10-CM: E11.22, N18.3, Z79.4 ICD-9-CM: 250.40, 585.3, V58.67 [facility] ([facility]     ICD-10-CM: E27.40 ICD-9-CM: 255.41 Hypothyroidism due to acquired atrophy of thyroid     ICD-10-CM: E03.4 ICD-9-CM: 244.8, 246.8 Vitals BP Pulse Resp Height(growth percentile) Weight(growth percentile) SpO2  
 112/68 70 17 5' 2\" (1.575 m) 176 lb (79.8 kg) 96% BMI OB Status Smoking Status 32.19 kg/m2 Postmenopausal Never Smoker Vitals History BMI and BSA Data Body Mass Index Body Surface Area  
 32.19 kg/m 2 1.87 m 2 Preferred Pharmacy Pharmacy Name Phone [facility]. [phone] Your Updated Medication List  
  
   
This list is accurate as of: 12/4/17  9:59 AM.  Always use your most recent med list. amLODIPine 5 mg tablet Commonly known as:  Mccarthy Belinda TAKE 1 TABLET BY MOUTH DAILY. ascorbic acid (vitamin C) 500 mg tablet Commonly known as:  VITAMIN C Take 1 Tab by mouth daily. aspirin 81 mg chewable tablet Take 1 Tab by mouth daily. atorvastatin 80 mg tablet Commonly known as:  LIPITOR Take 1 Tab by mouth nightly. colestipol 1 gram tablet Commonly known as:  COLESTID Take 1 g by mouth two (2) times a day. CULTURELLE PROBIOTICS 10 billion cell -200 mg Cpsp Generic drug:  Lactobac. rhamnosus GG-inulin ESTRACE 0.01 % (0.1 mg/gram) vaginal cream  
Generic drug:  estradiol  
  
 fludrocortisone 0.1 mg tablet Commonly known as:  FLORINEF Take 1 Tab by mouth daily. FLUoxetine 20 mg tablet Commonly known as:  PROzac Take 1 Tab by mouth nightly. furosemide 40 mg tablet Commonly known as:  LASIX Take 1 Tab by mouth daily. gabapentin 300 mg capsule Commonly known as:  NEURONTIN Take 1 Cap by mouth nightly. * glucose blood VI test strips strip Commonly known as:  PRODIGY NO CODING Patient test glucose 3 times daily. ICD E11.9  
  
 * PRODIGY NO CODING strip Generic drug:  glucose blood VI test strips TEST BLOOD SUGAR 3 TIMES DAILY. (ICD E11.9) hydrocortisone 5 mg tablet Commonly known as:  CORTEF  
15mg Qam, 5mg Qpm  
  
 insulin glargine 300 unit/mL (1.5 mL) Inpn Commonly known as:  TOUJEO SOLOSTAR  
50 Units by SubCUTAneous route nightly. levothyroxine 88 mcg tablet Commonly known as:  SYNTHROID Take 1 Tab by mouth Daily (before breakfast). Liraglutide 0.6 mg/0.1 mL (18 mg/3 mL) Pnij Commonly known as:  VICTOZA  
1.8mg daily  
  
 lisinopril 40 mg tablet Commonly known as:  Lopez Edman Take 1 Tab by mouth every morning. meloxicam 15 mg tablet Commonly known as:  MOBIC Take 15 mg by mouth daily. metFORMIN  mg tablet Commonly known as:  GLUCOPHAGE XR Take 1 Tab by mouth two (2) times daily (with meals). nitrofurantoin (macrocrystal-monohydrate) 100 mg capsule Commonly known as:  MACROBID One BID WITH FOOD  Indications: BACTERIAL URINARY TRACT INFECTION  
  
 oxybutynin 5 mg tablet Commonly known as:  DITROPAN  
  
 pantoprazole 40 mg tablet Commonly known as:  PROTONIX Take 1 Tab by mouth Daily (before breakfast). Indications: GASTROESOPHAGEAL REFLUX  
  
 trospium 20 mg tablet Commonly known as:  Melendez [place] TAKE 1 TABLET BY MOUTH TWICE A DAY. * Notice: This list has 2 medication(s) that are the same as other medications prescribed for you. Read the directions carefully, and ask your doctor or other care provider to review them with you. We Performed the Following BASIC METABOLIC PANEL (7) [63795 CPT(R)] HEMOGLOBIN A1C WITH EAG [39310 CPT(R)] MICROALBUMIN, UR, RAND W/ MICROALBUMIN/CREA RATIO [id number] CPT(R)] TSH AND FREE T4 [70500 CPT(R)] Follow-up Instructions Return in about 3 months (around 3/4/2018). Patient Instructions Diabetes Instructions: 
- continue Victoza 1.8mg daily 
- continue Toujeo 50 units daily 
- continue metformin Check blood sugar at least TWICE a day: every morning when you wake up and at bedtime.  Keep a record of your values and bring this or your glucometer with you to your next visit. Diet instructions: 
Reduce the amount of carbohydrates in your diet. This means not just avoiding sweets, sodas, or desserts but also reducing the amount of bread, pasta, rice, potatoes, corn, and crackers that you eat. Do not have more than one serving of carbs per meal (for example: do not eat a sandwich and potato chips in the same meal). DO NOT BUY COOKIES and SWEETS. DO NOT BUY PRETZELS and CHIPS. Focus on eating mostly protein (meat, poultry, fish, shellfish, eggs), healthy fats (avocados, nuts, cheese, olive or coconut oil) and non-starchy vegetables (greens, carrots, tomatoes, bell peppers, broccoli, brussels sprouts, green beans, etc). If you fill yourself up with these foods, you won't even want the carbs. Read food labels! Avoid food with added sugar or anything with more than 5g sugar per serving. Minimize your fruit intake as much as possible, no more than one serving per day. When you do eat fruit, choose lower sugar options like fresh berries. 
  
BREAKFAST: whole eggs, veggies, omelet, plain yogurt, de, sausage, protein shake or low-carb protein bar (Atkins, Quest, etc) LUNCH: salad with meat/poultry, veggies, cheese, nuts; low-carb wrap with veggies and meat, soup with meat/veggies DINNER: any type of meat/poultry or seafood (without breading), non-starchy vegetables 
  
SNACK OPTIONS: cheese (string cheese or individually wrapped snack size cheese), carrots and celery with Ranch or blue cheese dressing, nuts (almonds, pistachios, walnuts, etc), meat (snack size salami, ham, or turkey) SWEETS (in moderation): dark chocolate (greater than 75% cocoa), low-sugar ice cream (Halo Top, Breyers or Andrés's No Sugar Added) BEVERAGES: water, water, water Other options: unsweet tea (okay to add a pack of Splenda or Stevia if needed), sparkling water without calories (ex: [place], Jaziel, etc), coffee (unsweetened creamer is fine) Introducing [facility] & HEALTH SERVICES! Dear Rusty Espinosa: Thank you for requesting a [employer] account. Our records indicate that you already have an active [employer] account. You can access your account anytime at [email or web address] Did you know that you can access your hospital and ER discharge instructions at any time in Wallept? You can also review all of your test results from your hospital stay or ER visit. Additional Information If you have questions, please visit the Frequently Asked Questions section of the Walle[employer] website at [email or web address] Remember, [employer] is NOT to be used for urgent needs. For medical emergencies, dial 911. Now available from your iPhone and Android! Please provide this summary of care documentation to your next provider. Your primary care clinician is listed as Keegan Javier. If you have any questions after today's visit, please call [phone].

## 2017-12-04 NOTE — PATIENT INSTRUCTIONS
Diabetes Instructions:  - continue Victoza 1.8mg daily  - continue Toujeo 50 units daily  - continue metformin    Check blood sugar at least TWICE a day: every morning when you wake up and at bedtime. Keep a record of your values and bring this or your glucometer with you to your next visit. Diet instructions:  Reduce the amount of carbohydrates in your diet. This means not just avoiding sweets, sodas, or desserts but also reducing the amount of bread, pasta, rice, potatoes, corn, and crackers that you eat. Do not have more than one serving of carbs per meal (for example: do not eat a sandwich and potato chips in the same meal). DO NOT BUY COOKIES and SWEETS. DO NOT BUY PRETZELS and CHIPS. Focus on eating mostly protein (meat, poultry, fish, shellfish, eggs), healthy fats (avocados, nuts, cheese, olive or coconut oil) and non-starchy vegetables (greens, carrots, tomatoes, bell peppers, broccoli, brussels sprouts, green beans, etc). If you fill yourself up with these foods, you won't even want the carbs. Read food labels! Avoid food with added sugar or anything with more than 5g sugar per serving. Minimize your fruit intake as much as possible, no more than one serving per day.  When you do eat fruit, choose lower sugar options like fresh berries.     BREAKFAST: whole eggs, veggies, omelet, plain yogurt, de, sausage, protein shake or low-carb protein bar (Atkins, Quest, etc)  LUNCH: salad with meat/poultry, veggies, cheese, nuts; low-carb wrap with veggies and meat, soup with meat/veggies  DINNER: any type of meat/poultry or seafood (without breading), non-starchy vegetables     SNACK OPTIONS: cheese (string cheese or individually wrapped snack size cheese), carrots and celery with Ranch or blue cheese dressing, nuts (almonds, pistachios, walnuts, etc), meat (snack size salami, ham, or turkey)    SWEETS (in moderation): dark chocolate (greater than 75% cocoa), low-sugar ice cream (Halo Top, Breyers or Andrés's No Sugar Added)    BEVERAGES: water, water, water  Other options: unsweet tea (okay to add a pack of Splenda or Stevia if needed), sparkling water without calories (ex: [place], Jaziel, etc), coffee (unsweetened creamer is fine)

## 2017-12-06 LAB
ALBUMIN/CREAT UR: 88.5 MG/G CREAT (ref 0–30)
BUN SERPL-MCNC: 27 MG/DL (ref 8–27)
BUN/CREAT SERPL: 25 (ref 12–28)
CHLORIDE SERPL-SCNC: 101 MMOL/L (ref 96–106)
CO2 SERPL-SCNC: 26 MMOL/L (ref 18–29)
CREAT SERPL-MCNC: 1.07 MG/DL (ref 0.57–1)
CREAT UR-MCNC: 34.9 MG/DL
EST. AVERAGE GLUCOSE BLD GHB EST-MCNC: 203 MG/DL
GFR SERPLBLD CREATININE-BSD FMLA CKD-EPI: 50 ML/MIN/1.73
GFR SERPLBLD CREATININE-BSD FMLA CKD-EPI: 58 ML/MIN/1.73
GLUCOSE SERPL-MCNC: 220 MG/DL (ref 65–99)
HBA1C MFR BLD: 8.7 % (ref 4.8–5.6)
MICROALBUMIN UR-MCNC: 30.9 UG/ML
POTASSIUM SERPL-SCNC: 5.2 MMOL/L (ref 3.5–5.2)
SODIUM SERPL-SCNC: 142 MMOL/L (ref 134–144)
T4 FREE SERPL-MCNC: 0.98 NG/DL (ref 0.82–1.77)
TSH SERPL DL<=0.005 MIU/L-ACNC: 0.12 UIU/ML (ref 0.45–4.5)

## 2017-12-22 RX ORDER — HYDROCORTISONE 5 MG/1
TABLET ORAL
Qty: 120 TAB | Refills: 10 | Status: SHIPPED | OUTPATIENT
Start: 2017-12-22 | End: 2018-04-23 | Stop reason: SDUPTHER

## 2018-01-02 ENCOUNTER — HOSPITAL ENCOUNTER (OUTPATIENT)
Dept: LAB | Age: 78
Discharge: HOME | End: 2018-01-02
Payer: MEDICARE

## 2018-01-02 ENCOUNTER — OFFICE VISIT (OUTPATIENT)
Dept: INTERNAL MEDICINE CLINIC | Age: 78
End: 2018-01-02

## 2018-01-02 VITALS
DIASTOLIC BLOOD PRESSURE: 80 MMHG | SYSTOLIC BLOOD PRESSURE: 160 MMHG | OXYGEN SATURATION: 97 % | WEIGHT: 175.1 LBS | RESPIRATION RATE: 16 BRPM | HEART RATE: 75 BPM | TEMPERATURE: 97.6 F | BODY MASS INDEX: 32.22 KG/M2 | HEIGHT: 62 IN

## 2018-01-02 DIAGNOSIS — Z79.4 TYPE 2 DIABETES MELLITUS WITH STAGE 3 CHRONIC KIDNEY DISEASE, WITH LONG-TERM CURRENT USE OF INSULIN (HCC): ICD-10-CM

## 2018-01-02 DIAGNOSIS — I63.532 CEREBRAL INFARCTION DUE TO STENOSIS OF LEFT POSTERIOR CEREBRAL ARTERY (HCC): ICD-10-CM

## 2018-01-02 DIAGNOSIS — N18.30 TYPE 2 DIABETES MELLITUS WITH STAGE 3 CHRONIC KIDNEY DISEASE, WITH LONG-TERM CURRENT USE OF INSULIN (HCC): ICD-10-CM

## 2018-01-02 DIAGNOSIS — R41.3 MEMORY LOSS: Primary | ICD-10-CM

## 2018-01-02 DIAGNOSIS — E11.22 TYPE 2 DIABETES MELLITUS WITH STAGE 3 CHRONIC KIDNEY DISEASE, WITH LONG-TERM CURRENT USE OF INSULIN (HCC): ICD-10-CM

## 2018-01-02 DIAGNOSIS — I10 ESSENTIAL HYPERTENSION, BENIGN: Chronic | ICD-10-CM

## 2018-01-02 PROCEDURE — 36415 COLL VENOUS BLD VENIPUNCTURE: CPT

## 2018-01-02 PROCEDURE — 85651 RBC SED RATE NONAUTOMATED: CPT

## 2018-01-02 PROCEDURE — 84443 ASSAY THYROID STIM HORMONE: CPT

## 2018-01-02 NOTE — MR AVS SNAPSHOT
Visit Information Date & Time Provider Department Dept. Phone Encounter #  
 1/2/2018 12:00 PM Ramez Cantrell MD [address] [facility] [phone] Follow-up Instructions Return in about 6 weeks (around 2/13/2018). Your Appointments 3/27/2018  9:10 AM  
ROUTINE CARE with MD Donovan Alvarez Diabetes and Endocrinology [facility] Appt Note: f/u diabetes cp0.00  
 [address] [place] [address]  
[address] [facility] [address] [address] Upcoming Health Maintenance Date Due ZOSTER VACCINE AGE 60> 4/29/2000 LIPID PANEL Q1 10/13/2017 FOOT EXAM Q1 12/19/2017 MEDICARE YEARLY EXAM 4/19/2018 HEMOGLOBIN A1C Q6M 6/5/2018 EYE EXAM RETINAL OR DILATED Q1 9/20/2018 GLAUCOMA SCREENING Q2Y 10/12/2018 MICROALBUMIN Q1 12/5/2018 DTaP/Tdap/Td series (2 - Tdap) 3/30/2020 Allergies as of 1/2/2018  Review Complete On: 1/2/2018 By: Ramez Cantrell MD  
  
 Severity Noted Reaction Type Reactions Amoxicillin  10/13/2016    Unknown (comments) Erythromycin  04/22/2011    Rash, Unknown (comments) fever Current Immunizations  Reviewed on 10/18/2017 Name Date DTaP 3/30/2010 Influenza High Dose Vaccine PF 10/18/2017 Influenza Vaccine 10/30/2015 Influenza Vaccine Whole 10/15/2010 Pneumococcal Conjugate (PCV-13) 11/14/2017 ZZZ-RETIRED (DO NOT USE) Pneumococcal Vaccine (Unspecified Type) 10/15/2009 Not reviewed this visit You Were Diagnosed With   
  
 Codes Comments Memory loss    -  Primary ICD-10-CM: R41.3 ICD-9-CM: 780.93 Type 2 diabetes mellitus with stage 3 chronic kidney disease, with long-term current use of insulin (HCC)     ICD-10-CM: E11.22, N18.3, Z79.4 ICD-9-CM: 250.40, 585.3, V58.67 Essential hypertension, benign     ICD-10-CM: I10 
ICD-9-CM: 401.1 Cerebral infarction due to stenosis of left posterior cerebral artery (HCC)     ICD-10-CM: Z24.813 ICD-9-CM: 434.91 Vitals BP Pulse Temp Resp Height(growth percentile) Weight(growth percentile) 160/80 (BP 1 Location: Right arm, BP Patient Position: Sitting) 75 97.6 °F (36.4 °C) (Oral) 16 5' 2\" (1.575 m) 175 lb 1.6 oz (79.4 kg) SpO2 BMI OB Status Smoking Status 97% 32.03 kg/m2 Postmenopausal Never Smoker Vitals History BMI and BSA Data Body Mass Index Body Surface Area 32.03 kg/m 2 1.86 m 2 Preferred Pharmacy Pharmacy Name Phone [facility] [phone] Your Updated Medication List  
  
   
This list is accurate as of: 1/2/18 12:59 PM.  Always use your most recent med list. amLODIPine 5 mg tablet Commonly known as:  Malorie Glatter TAKE 1 TABLET BY MOUTH DAILY. aspirin 81 mg chewable tablet Take 1 Tab by mouth daily. atorvastatin 80 mg tablet Commonly known as:  LIPITOR Take 1 Tab by mouth nightly. colestipol 1 gram tablet Commonly known as:  COLESTID Take 1 g by mouth two (2) times a day. CULTURELLE PROBIOTICS 10 billion cell -200 mg Cpsp Generic drug:  Lactobac. rhamnosus GG-inulin ESTRACE 0.01 % (0.1 mg/gram) vaginal cream  
Generic drug:  estradiol  
  
 fludrocortisone 0.1 mg tablet Commonly known as:  FLORINEF Take 1 Tab by mouth daily. FLUoxetine 20 mg tablet Commonly known as:  PROzac Take 1 Tab by mouth nightly. furosemide 40 mg tablet Commonly known as:  LASIX Take 1 Tab by mouth daily. gabapentin 300 mg capsule Commonly known as:  NEURONTIN Take 1 Cap by mouth nightly. * glucose blood VI test strips strip Commonly known as:  PRODIGY NO CODING Patient test glucose 3 times daily. ICD E11.9  
  
 * PRODIGY NO CODING strip Generic drug:  glucose blood VI test strips TEST BLOOD SUGAR 3 TIMES DAILY. (ICD E11.9) hydrocortisone 5 mg tablet Commonly known as:  CORTEF  
TAKE 3 TABLETS IN THE MORNING AND TAKE 1 TABLET IN THE EVENING. insulin glargine 300 unit/mL (1.5 mL) Inpn Commonly known as:  TOUJEO SOLOSTAR  
50 Units by SubCUTAneous route nightly. levothyroxine 88 mcg tablet Commonly known as:  SYNTHROID Take 1 Tab by mouth Daily (before breakfast). Liraglutide 0.6 mg/0.1 mL (18 mg/3 mL) Pnij Commonly known as:  VICTOZA  
1.8mg daily  
  
 lisinopril 40 mg tablet Commonly known as:  Karyna Lindsay Take 1 Tab by mouth every morning. meloxicam 15 mg tablet Commonly known as:  MOBIC Take 15 mg by mouth daily. metFORMIN  mg tablet Commonly known as:  GLUCOPHAGE XR Take 1 Tab by mouth two (2) times daily (with meals). nitrofurantoin (macrocrystal-monohydrate) 100 mg capsule Commonly known as:  MACROBID One BID WITH FOOD  Indications: BACTERIAL URINARY TRACT INFECTION  
  
 oxybutynin 5 mg tablet Commonly known as:  DITROPAN  
  
 trospium 20 mg tablet Commonly known as:  Melendez Kerrick TAKE 1 TABLET BY MOUTH TWICE A DAY. * Notice: This list has 2 medication(s) that are the same as other medications prescribed for you. Read the directions carefully, and ask your doctor or other care provider to review them with you. We Performed the Following SED RATE (ESR) [id number] CPT(R)] TSH REFLEX TO T4 [[id number] Custom] VITAMIN B12 & FOLATE [00868 CPT(R)] Follow-up Instructions Return in about 6 weeks (around 2/13/2018). To-Do List   
 01/03/2018 Imaging:  MRI BRAIN WO CONT Referral Information Referral ID Referred By Referred To  
  
 [id number] Louise SKY Not Available Visits Status Start Date End Date 1 New Request 1/2/18 1/2/19  If your referral has a status of pending review or denied, additional information will be sent to support the outcome of this decision. Patient Instructions Keep mentally engaged as possible. Continue your current medications. Have an MRI of your brain. Introducing [facility] SERVICES! Dear Gio Harmon: Thank you for requesting a ExceleraRx account. Our records indicate that you already have an active Excel[employer]Rx account. You can access your account anytime at [email or web address] Did you know that you can access your hospital and ER discharge instructions at any time in ExceleraRx? You can also review all of your test results from your hospital stay or ER visit. Additional Information If you have questions, please visit the Frequently Asked Questions section of the ExceleraRx website at [email or web address] Remember, ExceleraRx is NOT to be used for urgent needs. For medical emergencies, dial 911. Now available from your iPhone and Android! Please provide this summary of care documentation to your next provider. Your primary care clinician is listed as Dominik Amin. If you have any questions after today's visit, please call [phone].

## 2018-01-02 NOTE — PROGRESS NOTES
Chief Complaint   Patient presents with    Diabetes     Reviewed record in preparation for visit and have obtained necessary documentation. Identified pt with two pt identifiers(name and ). Health Maintenance Due   Topic    ZOSTER VACCINE AGE 60>     LIPID PANEL Q1     FOOT EXAM Q1          Chief Complaint   Patient presents with    Diabetes        Wt Readings from Last 3 Encounters:   18 175 lb 1.6 oz (79.4 kg)   17 176 lb (79.8 kg)   10/18/17 178 lb (80.7 kg)     Temp Readings from Last 3 Encounters:   18 97.6 °F (36.4 °C) (Oral)   10/18/17 97 °F (36.1 °C) (Oral)   10/11/17 97.1 °F (36.2 °C) (Oral)     BP Readings from Last 3 Encounters:   18 160/80   17 112/68   10/18/17 110/60     Pulse Readings from Last 3 Encounters:   18 75   17 70   10/18/17 67           Learning Assessment:  :     Learning Assessment 2017   PRIMARY LEARNER Patient   HIGHEST LEVEL OF EDUCATION - PRIMARY LEARNER  > 4 YEARS OF COLLEGE   BARRIERS PRIMARY LEARNER NONE   PRIMARY LANGUAGE ENGLISH   LEARNER PREFERENCE PRIMARY LISTENING   ANSWERED BY patient   RELATIONSHIP SELF       Depression Screening:  :     No flowsheet data found. Fall Risk Assessment:  :     Fall Risk Assessment, last 12 mths 2017   Able to walk? Yes   Fall in past 12 months? No       Abuse Screening:  :     Abuse Screening Questionnaire 2017   Do you ever feel afraid of your partner? N   Are you in a relationship with someone who physically or mentally threatens you? N   Is it safe for you to go home?  Y       Coordination of Care Questionnaire:  :     1) Have you been to an emergency room, urgent care clinic since your last visit? no   Hospitalized since your last visit? no             2) Have you seen or consulted any other health care providers outside of [address] [place] [address] since your last visit? no  (Include any pap smears or colon screenings in this section.)    3) Do you have an Advance Directive on file? yes    4) Are you interested in receiving information on Advance Directives? NO      Patient is accompanied by son I have received verbal consent from Jenny Hinojosa to discuss any/all medical information while they are present in the room. Reviewed record  In preparation for visit and have obtained necessary documentation.

## 2018-01-02 NOTE — PROGRESS NOTES
Subjective:     Chief Complaint   Patient presents with    Diabetes     She  is a 68y.o. year old female who presents for evaluation. Dr Pal Nair has reduced her dose of thyroid and cortisone and she still has good energy. On once a day insulin (AM). Gets high blood sugars in the afternoon. Patient sneaks a lot of sweets. \"Craves sugar\". Son concerned about dementia. He lives in [place] but visits twice year. He has noted a steady decline in her cognitive status. Her and her  had moved to assisted living but soon thereafter she had a stroke he had a broken femur. He now lives in a care unit while she lives independently. Son has noticed some \"sun-downing\" type of confusion. He doesn't want to prolong her life if she has dementia and asks about cutting back on medications. Historical Data    Past Medical History:   Diagnosis Date    Arthritis osteoporosis    Cataract     Diabetes ([facility] [address]     Endocrine disease 1972    adrenal insufficiency - bilat adrenalectomy    GERD (gastroesophageal reflux disease)     H/O Cushing disease     Hypertension     Other ill-defined conditions(799.89)     elizabeth's disease    Stroke ([facility] [address] 2016    TIA 2011 & L occipial stroke 4/2016    Thyroid disease hypothyroid    UTI (lower urinary tract infection)         Past Surgical History:   Procedure Laterality Date    ENDOCRINE SURGERY PROC UNLISTED      bilat adrenalectomy in 1972    HX APPENDECTOMY      HX CATARACT REMOVAL      ON the left    HX CHOLECYSTECTOMY      HX CHOLECYSTECTOMY  2000    HX HYSTERECTOMY      HX OTHER SURGICAL  1972    adrenal gland removed 1970s    HX TONSILLECTOMY          Outpatient Encounter Prescriptions as of 1/2/2018   Medication Sig Dispense Refill    hydrocortisone (CORTEF) 5 mg tablet TAKE 3 TABLETS IN THE MORNING AND TAKE 1 TABLET IN THE EVENING.  120 Tab 10    PRODIGY NO CODING strip TEST BLOOD SUGAR 3 TIMES DAILY. (ICD E11.9) 150 Strip 3    amLODIPine (NORVASC) 5 mg tablet TAKE 1 TABLET BY MOUTH DAILY. 30 Tab 3    nitrofurantoin, macrocrystal-monohydrate, (MACROBID) 100 mg capsule One BID WITH FOOD  Indications: BACTERIAL URINARY TRACT INFECTION 10 Cap 0    levothyroxine (SYNTHROID) 88 mcg tablet Take 1 Tab by mouth Daily (before breakfast). 30 Tab 5    insulin glargine (TOUJEO SOLOSTAR) 300 unit/mL (1.5 mL) inpn 50 Units by SubCUTAneous route nightly. 9 Adjustable Dose Pre-filled Pen Syringe 3    metFORMIN ER (GLUCOPHAGE XR) 500 mg tablet Take 1 Tab by mouth two (2) times daily (with meals). 180 Tab 3    Liraglutide (VICTOZA) 0.6 mg/0.1 mL (18 mg/3 mL) sub-q pen 1.8mg daily 9 mL 5    furosemide (LASIX) 40 mg tablet Take 1 Tab by mouth daily. 90 Tab 3    trospium (SANCTURA) 20 mg tablet TAKE 1 TABLET BY MOUTH TWICE A DAY. 3    ESTRACE 0.01 % (0.1 mg/gram) vaginal cream   3    oxybutynin (DITROPAN) 5 mg tablet   2    glucose blood VI test strips (PRODIGY NO CODING) strip Patient test glucose 3 times daily. ICD E11.9 150 Strip 1    lisinopril (PRINIVIL, ZESTRIL) 40 mg tablet Take 1 Tab by mouth every morning. 90 Tab 3    aspirin 81 mg chewable tablet Take 1 Tab by mouth daily. 100 Tab 3    atorvastatin (LIPITOR) 80 mg tablet Take 1 Tab by mouth nightly. 90 Tab 3    FLUoxetine (PROZAC) 20 mg tablet Take 1 Tab by mouth nightly. 90 Tab 3    gabapentin (NEURONTIN) 300 mg capsule Take 1 Cap by mouth nightly. 90 Cap 3    fludrocortisone (FLORINEF) 0.1 mg tablet Take 1 Tab by mouth daily. 90 Tab 3    meloxicam (MOBIC) 15 mg tablet Take 15 mg by mouth daily.  colestipol (COLESTID) 1 gram tablet Take 1 g by mouth two (2) times a day.  CULTURELLE PROBIOTICS 10 billion cell -200 mg cpSP   99     No facility-administered encounter medications on file as of 1/2/2018.          Allergies   Allergen Reactions    Amoxicillin Unknown (comments)    Erythromycin Rash and Unknown (comments)     fever        Social History     Social History    Marital status:  Spouse name: N/A    Number of children: N/A    Years of education: N/A     Occupational History    Not on file. Social History Main Topics    Smoking status: Never Smoker    Smokeless tobacco: Never Used    Alcohol use No    Drug use: No    Sexual activity: Not Currently     Other Topics Concern    Not on file     Social History Narrative        Review of Systems  A comprehensive review of systems was negative except for that written in the HPI. Objective:     Vitals:    01/02/18 1210   BP: 160/80   Pulse: 75   Resp: 16   Temp: 97.6 °F (36.4 °C)   TempSrc: Oral   SpO2: 97%   Weight: 175 lb 1.6 oz (79.4 kg)   Height: 5' 2\" (1.575 m)     Pleasant WF in no acute distress. Cardiopulmonary exam is normal.  MOCA:  20/30 points (0/5 for 5 minute recall, impaired serial 7's and gave date as 2028). ASSESSMENT / PLAN:   1. Memory loss  · Likely SDAT. · MRI and blood work. · Discussed Aricept / Marvetta Rockers and it's limited utility.  - MRI BRAIN WO CONT; Future  - TSH REFLEX TO T4  - VITAMIN B12 & FOLATE  - SED RATE (ESR)    2. Type 2 diabetes mellitus with stage 3 chronic kidney disease, with long-term current use of insulin ([facility] [address]  · Sees Endocrinologist.    3. Essential hypertension, benign  · Continue current therapy. 4. Cerebral infarction due to stenosis of left posterior cerebral artery (HCC)  · Stable         Follow-up Disposition:  Return in about 6 weeks (around 2/13/2018). Advised her to call back or return to office if symptoms worsen/change/persist.  Discussed expected course/resolution/complications of diagnosis in detail with patient. Medication risks/benefits/costs/interactions/alternatives discussed with patient. She was given an after visit summary which includes diagnoses, current medications, & vitals. She expressed understanding with the diagnosis and plan.

## 2018-01-03 LAB
ERYTHROCYTE [SEDIMENTATION RATE] IN BLOOD BY WESTERGREN METHOD: 4 MM/HR (ref 0–40)
TSH SERPL DL<=0.005 MIU/L-ACNC: 0.06 UIU/ML (ref 0.45–4.5)

## 2018-01-10 ENCOUNTER — HOSPITAL ENCOUNTER (OUTPATIENT)
Dept: MRI IMAGING | Age: 78
Discharge: HOME | End: 2018-01-10
Attending: INTERNAL MEDICINE
Payer: MEDICARE

## 2018-01-10 DIAGNOSIS — R41.3 MEMORY LOSS: ICD-10-CM

## 2018-01-10 PROCEDURE — 70551 MRI BRAIN STEM W/O DYE: CPT

## 2018-01-16 ENCOUNTER — DOCUMENTATION ONLY (OUTPATIENT)
Dept: INTERNAL MEDICINE CLINIC | Age: 78
End: 2018-01-16

## 2018-01-16 DIAGNOSIS — D32.9 MENINGIOMA (HCC): Primary | ICD-10-CM

## 2018-01-16 NOTE — PROGRESS NOTES
Spoke to daughter regarding MRI findings of brain (meningioma). She accepts Neurosurgery consult. Consult generated and referral sent to patient.   Dr Babs Bowser

## 2018-01-17 ENCOUNTER — OFFICE VISIT (OUTPATIENT)
Dept: INTERNAL MEDICINE CLINIC | Age: 78
End: 2018-01-17

## 2018-01-17 VITALS
WEIGHT: 171.3 LBS | DIASTOLIC BLOOD PRESSURE: 80 MMHG | HEIGHT: 62 IN | TEMPERATURE: 96.2 F | OXYGEN SATURATION: 96 % | HEART RATE: 77 BPM | BODY MASS INDEX: 31.52 KG/M2 | SYSTOLIC BLOOD PRESSURE: 130 MMHG | RESPIRATION RATE: 16 BRPM

## 2018-01-17 DIAGNOSIS — D32.9 MENINGIOMA (HCC): Primary | ICD-10-CM

## 2018-01-17 NOTE — PROGRESS NOTES
Subjective:     Chief Complaint   Patient presents with    Results     discuss     She  is a 68y.o. year old female who presents for evaluation. Here to discuss results of recent MRI that shows an enlarging apparent meningioma. Report: There is a 2.6 x 2.1 x 2.5 cm extra-axial mass lesion adjacent to  the left anterior clinoid process. It has mass effect on the adjacent inferior  left temporal lobe with associated increased T2 signal and FLAIR signal.    Historical Data    Past Medical History:   Diagnosis Date    Arthritis osteoporosis    Cataract     Diabetes ([facility] [address]     Endocrine disease 1972    adrenal insufficiency - bilat adrenalectomy    GERD (gastroesophageal reflux disease)     H/O Cushing disease     Hypertension     Other ill-defined conditions(799.89)     elizabeth's disease    Stroke ([facility] [address] 2016    TIA 2011 & L occipial stroke 4/2016    Thyroid disease hypothyroid    UTI (lower urinary tract infection)         Past Surgical History:   Procedure Laterality Date    ENDOCRINE SURGERY PROC UNLISTED      bilat adrenalectomy in 1972    HX APPENDECTOMY      HX CATARACT REMOVAL      ON the left    HX CHOLECYSTECTOMY      HX CHOLECYSTECTOMY  2000    HX HYSTERECTOMY      HX OTHER SURGICAL  1972    adrenal gland removed 1970s    HX TONSILLECTOMY          Outpatient Encounter Prescriptions as of 1/17/2018   Medication Sig Dispense Refill    hydrocortisone (CORTEF) 5 mg tablet TAKE 3 TABLETS IN THE MORNING AND TAKE 1 TABLET IN THE EVENING. 120 Tab 10    PRODIGY NO CODING strip TEST BLOOD SUGAR 3 TIMES DAILY. (ICD E11.9) 150 Strip 3    amLODIPine (NORVASC) 5 mg tablet TAKE 1 TABLET BY MOUTH DAILY. 30 Tab 3    levothyroxine (SYNTHROID) 88 mcg tablet Take 1 Tab by mouth Daily (before breakfast). 30 Tab 5    insulin glargine (TOUJEO SOLOSTAR) 300 unit/mL (1.5 mL) inpn 50 Units by SubCUTAneous route nightly.  9 Adjustable Dose Pre-filled Pen Syringe 3    metFORMIN ER (GLUCOPHAGE XR) 500 mg tablet Take 1 Tab by mouth two (2) times daily (with meals). 180 Tab 3    Liraglutide (VICTOZA) 0.6 mg/0.1 mL (18 mg/3 mL) sub-q pen 1.8mg daily 9 mL 5    furosemide (LASIX) 40 mg tablet Take 1 Tab by mouth daily. 90 Tab 3    trospium (SANCTURA) 20 mg tablet TAKE 1 TABLET BY MOUTH TWICE A DAY. 3    CULTURELLE PROBIOTICS 10 billion cell -200 mg cpSP   99    oxybutynin (DITROPAN) 5 mg tablet   2    glucose blood VI test strips (PRODIGY NO CODING) strip Patient test glucose 3 times daily. ICD E11.9 150 Strip 1    lisinopril (PRINIVIL, ZESTRIL) 40 mg tablet Take 1 Tab by mouth every morning. 90 Tab 3    aspirin 81 mg chewable tablet Take 1 Tab by mouth daily. 100 Tab 3    atorvastatin (LIPITOR) 80 mg tablet Take 1 Tab by mouth nightly. 90 Tab 3    FLUoxetine (PROZAC) 20 mg tablet Take 1 Tab by mouth nightly. 90 Tab 3    gabapentin (NEURONTIN) 300 mg capsule Take 1 Cap by mouth nightly. 90 Cap 3    fludrocortisone (FLORINEF) 0.1 mg tablet Take 1 Tab by mouth daily. 90 Tab 3    meloxicam (MOBIC) 15 mg tablet Take 15 mg by mouth daily.  colestipol (COLESTID) 1 gram tablet Take 1 g by mouth two (2) times a day.  nitrofurantoin, macrocrystal-monohydrate, (MACROBID) 100 mg capsule One BID WITH FOOD  Indications: BACTERIAL URINARY TRACT INFECTION 10 Cap 0    ESTRACE 0.01 % (0.1 mg/gram) vaginal cream   3     No facility-administered encounter medications on file as of 1/17/2018. Allergies   Allergen Reactions    Amoxicillin Unknown (comments)    Erythromycin Rash and Unknown (comments)     fever        Social History     Social History    Marital status:      Spouse name: N/A    Number of children: N/A    Years of education: N/A     Occupational History    Not on file.      Social History Main Topics    Smoking status: Never Smoker    Smokeless tobacco: Never Used    Alcohol use No    Drug use: No    Sexual activity: Not Currently     Other Topics Concern    Not on file     Social History Narrative        Review of Systems  Pertinent items are noted in HPI. Objective:     Vitals:    01/17/18 1435   BP: 130/80   Pulse: 77   Resp: 16   Temp: 96.2 °F (35.7 °C)   SpO2: 96%   Weight: 171 lb 4.8 oz (77.7 kg)   Height: 5' 2\" (1.575 m)     Patient not re-examined. Discussion with patient and daughter. ASSESSMENT / PLAN:   1. Meningioma ([address]  · Discussed location of meningioma and possible effects. · Will ask for neurosurgeon assessment and recommendation of surgery versus radiation. · All questions answered to their satisfaction    Advanced medical directive provided. Follow-up Disposition: Not on File   Advised her to call back or return to office if symptoms worsen/change/persist.  Discussed expected course/resolution/complications of diagnosis in detail with patient. Medication risks/benefits/costs/interactions/alternatives discussed with patient. She was given an after visit summary which includes diagnoses, current medications, & vitals. She expressed understanding with the diagnosis and plan.

## 2018-01-17 NOTE — PROGRESS NOTES
Chief Complaint   Patient presents with    Results     discuss     Reviewed record in preparation for visit and have obtained necessary documentation. Identified pt with two pt identifiers(name and ). Health Maintenance Due   Topic    ZOSTER VACCINE AGE 60>     LIPID PANEL Q1     FOOT EXAM Q1          Chief Complaint   Patient presents with    Results     discuss        Wt Readings from Last 3 Encounters:   18 171 lb 4.8 oz (77.7 kg)   18 175 lb 1.6 oz (79.4 kg)   17 176 lb (79.8 kg)     Temp Readings from Last 3 Encounters:   18 96.2 °F (35.7 °C)   18 97.6 °F (36.4 °C) (Oral)   10/18/17 97 °F (36.1 °C) (Oral)     BP Readings from Last 3 Encounters:   18 130/80   18 160/80   17 112/68     Pulse Readings from Last 3 Encounters:   18 77   18 75   17 70           Learning Assessment:  :     Learning Assessment 2017   PRIMARY LEARNER Patient   HIGHEST LEVEL OF EDUCATION - PRIMARY LEARNER  > 4 YEARS OF COLLEGE   BARRIERS PRIMARY LEARNER NONE   PRIMARY LANGUAGE ENGLISH   LEARNER PREFERENCE PRIMARY LISTENING   ANSWERED BY patient   RELATIONSHIP SELF       Depression Screening:  :     No flowsheet data found. Fall Risk Assessment:  :     Fall Risk Assessment, last 12 mths 2017   Able to walk? Yes   Fall in past 12 months? No       Abuse Screening:  :     Abuse Screening Questionnaire 2017   Do you ever feel afraid of your partner? N   Are you in a relationship with someone who physically or mentally threatens you? N   Is it safe for you to go home?  Y       Coordination of Care Questionnaire:  :     1) Have you been to an emergency room, urgent care clinic since your last visit? no   Hospitalized since your last visit? no             2) Have you seen or consulted any other health care providers outside of [address] [place] [address] since your last visit? no  (Include any pap smears or colon screenings in this section.)    3) Do you have an Advance Directive on file? yes    4) Are you interested in receiving information on Advance Directives? NO      Patient is accompanied by daughter I have received verbal consent from Jori Hernandez to discuss any/all medical information while they are present in the room. Reviewed record  In preparation for visit and have obtained necessary documentation.

## 2018-01-26 DIAGNOSIS — E11.22 CONTROLLED TYPE 2 DIABETES MELLITUS WITH STAGE 3 CHRONIC KIDNEY DISEASE, WITH LONG-TERM CURRENT USE OF INSULIN (HCC): ICD-10-CM

## 2018-01-26 DIAGNOSIS — Z79.4 CONTROLLED TYPE 2 DIABETES MELLITUS WITH STAGE 3 CHRONIC KIDNEY DISEASE, WITH LONG-TERM CURRENT USE OF INSULIN (HCC): ICD-10-CM

## 2018-01-26 DIAGNOSIS — F32.A DEPRESSION, UNSPECIFIED DEPRESSION TYPE: Chronic | ICD-10-CM

## 2018-01-26 DIAGNOSIS — N18.30 CONTROLLED TYPE 2 DIABETES MELLITUS WITH STAGE 3 CHRONIC KIDNEY DISEASE, WITH LONG-TERM CURRENT USE OF INSULIN (HCC): ICD-10-CM

## 2018-01-26 RX ORDER — GABAPENTIN 300 MG/1
CAPSULE ORAL
Qty: 90 CAP | Refills: 3 | Status: SHIPPED | OUTPATIENT
Start: 2018-01-26 | End: 2018-04-23 | Stop reason: SDUPTHER

## 2018-01-26 RX ORDER — FLUOXETINE HYDROCHLORIDE 20 MG/1
CAPSULE ORAL
Qty: 90 CAP | Refills: 3 | Status: SHIPPED | OUTPATIENT
Start: 2018-01-26 | End: 2018-04-23 | Stop reason: SDUPTHER

## 2018-02-09 DIAGNOSIS — N18.30 CONTROLLED TYPE 2 DIABETES MELLITUS WITH STAGE 3 CHRONIC KIDNEY DISEASE, WITH LONG-TERM CURRENT USE OF INSULIN (HCC): ICD-10-CM

## 2018-02-09 DIAGNOSIS — E11.22 CONTROLLED TYPE 2 DIABETES MELLITUS WITH STAGE 3 CHRONIC KIDNEY DISEASE, WITH LONG-TERM CURRENT USE OF INSULIN (HCC): ICD-10-CM

## 2018-02-09 DIAGNOSIS — Z79.4 CONTROLLED TYPE 2 DIABETES MELLITUS WITH STAGE 3 CHRONIC KIDNEY DISEASE, WITH LONG-TERM CURRENT USE OF INSULIN (HCC): ICD-10-CM

## 2018-02-09 RX ORDER — LEVOTHYROXINE SODIUM 88 UG/1
TABLET ORAL
Qty: 30 TAB | Refills: 5 | Status: SHIPPED | OUTPATIENT
Start: 2018-02-09 | End: 2018-04-23 | Stop reason: SDUPTHER

## 2018-02-11 RX ORDER — ATORVASTATIN CALCIUM 80 MG/1
TABLET, FILM COATED ORAL
Qty: 90 TAB | Refills: 3 | Status: SHIPPED | OUTPATIENT
Start: 2018-02-11 | End: 2018-04-23 | Stop reason: SDUPTHER

## 2018-02-15 RX ORDER — FLUDROCORTISONE ACETATE 0.1 MG/1
TABLET ORAL
Qty: 90 TAB | Refills: 3 | Status: SHIPPED | OUTPATIENT
Start: 2018-02-15 | End: 2018-04-23 | Stop reason: SDUPTHER

## 2018-03-15 DIAGNOSIS — E11.22 CONTROLLED TYPE 2 DIABETES MELLITUS WITH STAGE 3 CHRONIC KIDNEY DISEASE, WITH LONG-TERM CURRENT USE OF INSULIN (HCC): ICD-10-CM

## 2018-03-15 DIAGNOSIS — Z79.4 CONTROLLED TYPE 2 DIABETES MELLITUS WITH STAGE 3 CHRONIC KIDNEY DISEASE, WITH LONG-TERM CURRENT USE OF INSULIN (HCC): ICD-10-CM

## 2018-03-15 DIAGNOSIS — N18.30 CONTROLLED TYPE 2 DIABETES MELLITUS WITH STAGE 3 CHRONIC KIDNEY DISEASE, WITH LONG-TERM CURRENT USE OF INSULIN (HCC): ICD-10-CM

## 2018-03-15 RX ORDER — INSULIN DEGLUDEC 100 U/ML
INJECTION, SOLUTION SUBCUTANEOUS
Qty: 9 PEN | Refills: 5 | Status: SHIPPED | OUTPATIENT
Start: 2018-03-15 | End: 2018-06-06 | Stop reason: SDUPTHER

## 2018-03-15 NOTE — TELEPHONE ENCOUNTER
Currently waiting on a PA approval for the Toujeo. Pt is currently out and her caregiver would like know if samples are available.

## 2018-03-15 NOTE — TELEPHONE ENCOUNTER
Received a notification that her insurance will cover Basaglar, Levemir, or Tresiba instead. Kristal Siddiqi is most similar to Toujeo so I will send in this prescription to [facility] now. Does she still need a sample?

## 2018-03-22 RX ORDER — AMLODIPINE BESYLATE 5 MG/1
TABLET ORAL
Qty: 30 TAB | Refills: 3 | Status: SHIPPED | OUTPATIENT
Start: 2018-03-22 | End: 2018-04-23 | Stop reason: SDUPTHER

## 2018-03-22 RX ORDER — LISINOPRIL 40 MG/1
TABLET ORAL
Qty: 90 TAB | Refills: 3 | Status: SHIPPED | OUTPATIENT
Start: 2018-03-22 | End: 2018-04-23 | Stop reason: SDUPTHER

## 2018-03-27 ENCOUNTER — OFFICE VISIT (OUTPATIENT)
Dept: ENDOCRINOLOGY | Age: 78
End: 2018-03-27

## 2018-03-27 VITALS
RESPIRATION RATE: 16 BRPM | SYSTOLIC BLOOD PRESSURE: 125 MMHG | HEIGHT: 62 IN | DIASTOLIC BLOOD PRESSURE: 81 MMHG | BODY MASS INDEX: 31.65 KG/M2 | HEART RATE: 70 BPM | WEIGHT: 172 LBS | OXYGEN SATURATION: 97 %

## 2018-03-27 DIAGNOSIS — E11.65 TYPE 2 DIABETES MELLITUS WITH HYPERGLYCEMIA, WITH LONG-TERM CURRENT USE OF INSULIN (HCC): Primary | ICD-10-CM

## 2018-03-27 DIAGNOSIS — E03.4 HYPOTHYROIDISM DUE TO ACQUIRED ATROPHY OF THYROID: Chronic | ICD-10-CM

## 2018-03-27 DIAGNOSIS — E27.40 ADRENAL INSUFFICIENCY (HCC): Chronic | ICD-10-CM

## 2018-03-27 DIAGNOSIS — Z79.4 TYPE 2 DIABETES MELLITUS WITH HYPERGLYCEMIA, WITH LONG-TERM CURRENT USE OF INSULIN (HCC): Primary | ICD-10-CM

## 2018-03-27 RX ORDER — POTASSIUM &MAGNESIUM ASPARTATE 250-250 MG
500 CAPSULE ORAL DAILY
COMMUNITY
End: 2018-04-23 | Stop reason: SDUPTHER

## 2018-03-27 NOTE — PROGRESS NOTES
Endocrinology Visit    Chief Complaint: Type 2 diabetes, adrenal insufficiency, hypothyroidism    History of Present Illness: Jori Hernandez is a 68 y.o. female who returns for f/u of adrenal insufficiency, hypothyroidism, and type 2 diabetes mellitus. Records from her previous endocrinologist Dr Mariam Hobbs indicate pt had pituitary Cushings - treatment of which involved XRT (presumably to her pituitary) and BL adrenalectomy. She subsequently developed primary AI and is on hydrocortisone and fludrocortisone replacement. I saw her in initial consultation in December 2016 at which time I made several adjustments to her medication regimen as detailed by problem below. In May, we decided to try switching Novolog to Victoza + metformin to help improve her blood sugar control (A1c was 9.6%) and weight. She lost 16 lbs since and A1c had decreased to 8.0% in August. Since then, she has been sneaking desserts and juices (per caregiver with her today). She has had some very high blood sugar readings at night (300-500), but since switching from Toujeo to Almarie Maxcy 2 weeks ago, her blood sugars have been much better (range 73-200s). She has also been eating less the past two weeks on account of her 's passing. Regarding type 2 diabetes, current glycemic medication regimen is Victoza 1.8mg daily, metformin XR 500mg BID, and Tresiba 50 units Qam. She has a caretaker who monitors her medication administration. She injects in her stomach and her thighs. Home blood glucose monitoring frequency: 2-3 times/day  Glucose range at home: . Log shows she typically trends up as the day progresses. Known complications include CKD and neuropathy. Last eye exam was over a year ago. She does exercise, walks every day. Her food is provided at her [facility]. She does not necessarily try to restrict dietary carbohydrate intake - admits she has been eating more cookies and desserts recently.  Also drinking a lot of orange juice (just stopped last week because she ran out). When she goes to the grocery store with her caretaker she gets several bags of cookies in addition to ice cream, cheese crackers, and chips. Snacks on these between meals. She also has a history of hypothyroidism and is currently on generic levothyroxine 88 mcg daily. This has been reduced gradually from 125 to 112 to 100, now 88 (decreased at her last visit 3 months ago due to low TSH). 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 She takes her medication first thing in the morning on an empty stomach and waits at least 30 minutes before eating or taking her other meds. She does umana insomnia (no trouble falling asleep, just wakes up around midnight every night). Denies palpitations, tremors, changes in bowel habits or energy level. Regarding her AI, she currently takes HC 15mg Qam, 5mg Qpm. I reduced her morning dose from 20mg to 15mg. She is also taking fludrocortisone 0.1 mg daily.  At her last visit, her sodium, potassium, and renin levels were normal.     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    Depression F32.9    Elevated cholesterol E78.00    Vitamin D deficiency E55.9    Adrenal insufficiency (HCC) E27.40    Hypothyroidism E03.9    Intracranial vascular stenosis I67.9    Meningioma ([address] D32.9    Cerebral infarction due to stenosis of left posterior cerebral artery (HCC) Y21.070    Unstable angina ([address] I20.0    Type 2 diabetes mellitus with stage 3 chronic kidney disease, with long-term current use of insulin (HCC) E11.22, N18.3, Z79.4       Past Medical History:    Past Medical History:   Diagnosis Date    Arthritis osteoporosis    Cataract     [facility]     Endocrine disease 0    adrenal insufficiency - bilat adrenalectomy    GERD (gastroesophageal reflux disease)     H/O Cushing disease     Hypertension     Other ill-defined conditions(799.89)     elizabeth's disease    Stroke ([facility] [address] 2016    TIA 2011 & L occipial stroke 4/2016    Thyroid disease hypothyroid    UTI (lower urinary tract infection)        Past Surgical History:  Past Surgical History:   Procedure Laterality Date    ENDOCRINE SURGERY PROC UNLISTED      bilat adrenalectomy in 1972    HX APPENDECTOMY      HX CATARACT REMOVAL      ON the left    HX CHOLECYSTECTOMY      HX CHOLECYSTECTOMY  2000    HX HYSTERECTOMY      HX OTHER SURGICAL  1972    adrenal gland removed 1970s    HX TONSILLECTOMY         Social History:  Social History     Social History    Marital status:      Spouse name: N/A    Number of children: N/A    Years of education: N/A     Occupational History    Not on file. Social History Main Topics    Smoking status: Never Smoker    Smokeless tobacco: Never Used    Alcohol use No    Drug use: No    Sexual activity: Not Currently     Other Topics Concern    Not on file     Social History Narrative       Family History:  Family History   Problem Relation Age of Onset   [address] [facility] Cancer Mother     Diabetes Mother    [address] [facility] Stroke Mother     Psychiatric Disorder Mother     Dementia Mother     Headache Mother     Heart Disease Father     Psychiatric Disorder Father     Stroke Father     Asthma Sister     Diabetes Sister     Asthma Brother     Heart Disease Brother        Medications:     Current Outpatient Prescriptions:     amLODIPine (NORVASC) 5 mg tablet, TAKE 1 TABLET BY MOUTH DAILY. , Disp: 30 Tab, Rfl: 3    lisinopril (PRINIVIL, ZESTRIL) 40 mg tablet, TAKE 1 TABLET BY MOUTH EVERY MORNING., Disp: 90 Tab, Rfl: 3    insulin degludec (TRESIBA FLEXTOUCH U-100) 100 unit/mL (3 mL) inpn, Inject 50 units daily, Disp: 9 Pen, Rfl: 5    fludrocortisone (FLORINEF) 0.1 mg tablet, TAKE 1 TABLET BY MOUTH DAILY. , Disp: 90 Tab, Rfl: 3    atorvastatin (LIPITOR) 80 mg tablet, TAKE 1 TABLET BY MOUTH NIGHTLY., Disp: 90 Tab, Rfl: 3    levothyroxine (SYNTHROID) 88 mcg tablet, TAKE 1 TABLET BY MOUTH DAILY BEFORE BREAKFAST., Disp: 30 Tab, Rfl: 5    gabapentin (NEURONTIN) 300 mg capsule, TAKE 1 CAPSULE BY MOUTH NIGTLY., Disp: 90 Cap, Rfl: 3    FLUoxetine (PROZAC) 20 mg capsule, TAKE 1 CAPSULE BY MOUTH NIGHTLY., Disp: 90 Cap, Rfl: 3    hydrocortisone (CORTEF) 5 mg tablet, TAKE 3 TABLETS IN THE MORNING AND TAKE 1 TABLET IN THE EVENING., Disp: 120 Tab, Rfl: 10    PRODIGY NO CODING strip, TEST BLOOD SUGAR 3 TIMES DAILY. (ICD E11.9), Disp: 150 Strip, Rfl: 3    nitrofurantoin, macrocrystal-monohydrate, (MACROBID) 100 mg capsule, One BID WITH FOOD  Indications: BACTERIAL URINARY TRACT INFECTION, Disp: 10 Cap, Rfl: 0    metFORMIN ER (GLUCOPHAGE XR) 500 mg tablet, Take 1 Tab by mouth two (2) times daily (with meals). , Disp: 180 Tab, Rfl: 3    Liraglutide (VICTOZA) 0.6 mg/0.1 mL (18 mg/3 mL) sub-q pen, 1.8mg daily, Disp: 9 mL, Rfl: 5    furosemide (LASIX) 40 mg tablet, Take 1 Tab by mouth daily. , Disp: 90 Tab, Rfl: 3    trospium (SANCTURA) 20 mg tablet, TAKE 1 TABLET BY MOUTH TWICE A DAY., Disp: , Rfl: 3    CULTURELLE PROBIOTICS 10 billion cell -200 mg cpSP, , Disp: , Rfl: 99    ESTRACE 0.01 % (0.1 mg/gram) vaginal cream, , Disp: , Rfl: 3    oxybutynin (DITROPAN) 5 mg tablet, , Disp: , Rfl: 2    glucose blood VI test strips (PRODIGY NO CODING) strip, Patient test glucose 3 times daily. ICD E11.9, Disp: 150 Strip, Rfl: 1    aspirin 81 mg chewable tablet, Take 1 Tab by mouth daily. , Disp: 100 Tab, Rfl: 3    meloxicam (MOBIC) 15 mg tablet, Take 15 mg by mouth daily. , Disp: , Rfl:     colestipol (COLESTID) 1 gram tablet, Take 1 g by mouth two (2) times a day., Disp: , Rfl:     Allergies:   Allergies   Allergen Reactions    Amoxicillin Unknown (comments)    Erythromycin Rash and Unknown (comments)     fever       Physical Examination:  Visit Vitals    /81    Pulse 70    Resp 16    Ht 5' 2\" (1.575 m)    Wt 172 lb (78 kg)    SpO2 97%    BMI 31.46 kg/m2      Gen: elderly female in no acute distress  HEENT: mucous membranes moist  Thyroid: no enlargement or nodules noted  CAD: normal rate, regular rhythm  PULM: unlabored respirations  GI: nontender, mild lipohypertrophy to the right of the umbilicus  EXT: no clubbing, cyanosis or edema  Neuro: grossly non focal, reflexes are brisk but no tremor of outstretched hands  Psych: pleasant, fair insight into medical hx  Skin: warm, dry    Diabetic foot exam:   Left: Filament test reduced sensation with micro filament   Pulse DP: 2+ (normal)   Pulse PT: 2+ (normal)   Deformities: None  Right: Filament test reduced sensation with micro filament   Pulse DP: 2+ (normal)   Pulse PT: 2+ (normal)   Deformities: None    Clinical Data Review:  Lab Results   Component Value Date/Time    Hemoglobin A1c 8.7 (H) 12/05/2017 09:36 AM     Lab Results   Component Value Date/Time    Sodium 142 12/05/2017 09:36 AM    Potassium 5.2 12/05/2017 09:36 AM    Chloride 101 12/05/2017 09:36 AM    CO2 26 12/05/2017 09:36 AM    Anion gap 7 02/03/2017 01:55 PM    Glucose 220 (H) 12/05/2017 09:36 AM    BUN 27 12/05/2017 09:36 AM    Creatinine 1.07 (H) 12/05/2017 09:36 AM    BUN/Creatinine ratio 25 12/05/2017 09:36 AM    GFR est AA 58 (L) 12/05/2017 09:36 AM    GFR est non-AA 50 (L) 12/05/2017 09:36 AM    Calcium 9.5 08/04/2017 11:25 AM     Lab Results   Component Value Date/Time    Microalb/Creat ratio (ug/mg creat.) 88.5 (H) 12/05/2017 09:36 AM     Lab Results   Component Value Date/Time    Cholesterol, total 235 (H) 10/13/2016 12:13 PM    HDL Cholesterol 39 10/13/2016 12:13 PM    LDL, calculated 116.8 (H) 10/13/2016 12:13 PM    VLDL, calculated 79.2 10/13/2016 12:13 PM    Triglyceride 396 (H) 10/13/2016 12:13 PM    CHOL/HDL Ratio 6.0 (H) 10/13/2016 12:13 PM     Lab Results   Component Value Date/Time    Hemoglobin A1c 8.7 (H) 12/05/2017 09:36 AM     Lab Results   Component Value Date/Time    TSH 0.060 (L) 01/02/2018 01:05 PM    TSH 0.118 (L) 12/05/2017 09:36 AM    T4, Free 0.98 12/05/2017 09:36 AM    T4, Total 10.1 04/23/2011 04:07 AM      Assessment and Plan:  Patient is a 68 y.o. female here for type 2 diabetes, previously suboptimally controlled on multiple daily insulin injection regimen. A1c improved after switching mealtime insulin to GLP1a (9.6 --> 8.0%) however she has had hyperglycemia intermittently in the past few months presumably from high carbohydrate food choices. I discussed possibly resuming low dose mealtime insulin but would prefer that she modify her diet rather than adding a medication. Will give her 4 more months to achieve a goal A1c below 8%. Should she fail to reach this goal, will resume mealtime insulin in addition to her current agents. 1. Type 2 diabetes mellitus with stage 3 chronic kidney disease, with long-term current use of insulin (HCC)     Glycemic Medication Changes:  - continue Victoza 1.8mg daily  - continue metformin XR 500mg BID  - continue Tresiba 50 units Qam  - provided extensive counseling on dietary carbohydrate reduction    DM Health Maintenance: pertinent items updated in HM tab  A1c: update today  Cv/Lipids: on atorvastatin, update lipids today  BP/Renal: BP at goal, on ACEi, microalbumin: update today  Vaccines: per PCP  Podiatry: no active issues, encouraged well-fitting footwear and daily inspection  Neuro: + neuropathy, foot exam updated today  Ophtho: yearly eye exam recommended  Diet and exercise: discussed healthy eating and exercise recommendations, particularly reduction in dietary carbohydrates as above     2. Adrenal insufficiency ([address]): primary s/p BL adrenalectomy.  Continue HC 15mg Qam, 5mg Qpm. Recent sodium/potassium levels were normal - continue fludrocortisone. Check BMP today. 3. Acquired hypothyroidism: euthyroid on exam after recent LT4 dose adjustment (decrease from 100 to 88). Check TFTs to confirm this today. 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 I spent 25 minutes with the patient today and > 50% of the time was spent counseling the patient about thyroid and adrenal medication management, also diabetes management including medication options and dietary modification. Patient verbalized an understanding and will return to clinic in 4 months. Thank you for the opportunity to participate in this patient's care.     Danial Rabago MD  [facility]  [facility]

## 2018-03-27 NOTE — PATIENT INSTRUCTIONS
Continue current medication doses. Avoid fruit juices and any beverages with sugar. Try to drink mostly water and unsweet tea (okay to add a pack of Splenda). Avoid cookies and desserts.

## 2018-03-27 NOTE — MR AVS SNAPSHOT
[address] [facility] 
[phone] Patient: Luke Unger MRN: [id number] KIM:[id number] Visit Information Date & Time Provider Department Dept. Phone Encounter #  
 3/27/2018  9:10 AM Farhana Murillo, [address] [place] [address] Diabetes and Endocrinology [phone] Follow-up Instructions Return in about 4 months (around 7/27/2018). Upcoming Health Maintenance Date Due ZOSTER VACCINE AGE 60> 4/29/2000 Bone Densitometry (Dexa) Screening 6/29/2005 LIPID PANEL Q1 10/13/2017 FOOT EXAM Q1 12/19/2017 MEDICARE YEARLY EXAM 4/19/2018 HEMOGLOBIN A1C Q6M 6/5/2018 EYE EXAM RETINAL OR DILATED Q1 9/20/2018 GLAUCOMA SCREENING Q2Y 10/12/2018 MICROALBUMIN Q1 12/5/2018 DTaP/Tdap/Td series (2 - Tdap) 3/30/2020 Allergies as of 3/27/2018  Review Complete On: 3/27/2018 By: Wallace Henriquez Severity Noted Reaction Type Reactions Amoxicillin  10/13/2016    Unknown (comments) Erythromycin  04/22/2011    Rash, Unknown (comments) fever Current Immunizations  Reviewed on 10/18/2017 Name Date DTaP 3/30/2010 Influenza High Dose Vaccine PF 10/18/2017 Influenza Vaccine 10/30/2015 Influenza Vaccine Whole 10/15/2010 Pneumococcal Conjugate (PCV-13) 11/14/2017 ZZZ-RETIRED (DO NOT USE) Pneumococcal Vaccine (Unspecified Type) 10/15/2009 Not reviewed this visit You Were Diagnosed With   
  
 Codes Comments Type 2 diabetes mellitus with hyperglycemia, with long-term current use of insulin (HCC)    -  Primary ICD-10-CM: E11.65, Z79.4 ICD-9-CM: 250.00, 790.29, V58.67 Adrenal insufficiency ([facility]     ICD-10-CM: E27.40 ICD-9-CM: 255.41 Hypothyroidism due to acquired atrophy of thyroid     ICD-10-CM: E03.4 ICD-9-CM: 244.8, 246.8 Vitals BP Pulse Resp Height(growth percentile) Weight(growth percentile) SpO2 125/81 70 16 5' 2\" (1.575 m) 172 lb (78 kg) 97% BMI OB Status Smoking Status 31.46 kg/m2 Postmenopausal Never Smoker Vitals History BMI and BSA Data Body Mass Index Body Surface Area  
 31.46 kg/m 2 1.85 m 2 Preferred Pharmacy Pharmacy Name Phone [facility] [phone] Your Updated Medication List  
  
   
This list is accurate as of 3/27/18  9:51 AM.  Always use your most recent med list. amLODIPine 5 mg tablet Commonly known as:  Ardeen Squire TAKE 1 TABLET BY MOUTH DAILY. aspirin 81 mg chewable tablet Take 1 Tab by mouth daily. atorvastatin 80 mg tablet Commonly known as:  LIPITOR  
TAKE 1 TABLET BY MOUTH NIGHTLY. colestipol 1 gram tablet Commonly known as:  COLESTID Take 1 g by mouth two (2) times a day. cranberry 500 mg capsule Take 500 mg by mouth daily. CULTURELLE PROBIOTICS 10 billion cell -200 mg Cpsp Generic drug:  Lactobac. rhamnosus GG-inulin ESTRACE 0.01 % (0.1 mg/gram) vaginal cream  
Generic drug:  estradiol  
  
 fludrocortisone 0.1 mg tablet Commonly known as:  FLORINEF  
TAKE 1 TABLET BY MOUTH DAILY. FLUoxetine 20 mg capsule Commonly known as:  PROzac TAKE 1 CAPSULE BY MOUTH NIGHTLY. furosemide 40 mg tablet Commonly known as:  LASIX Take 1 Tab by mouth daily. gabapentin 300 mg capsule Commonly known as:  NEURONTIN  
TAKE 1 CAPSULE BY MOUTH NIGTLY. * glucose blood VI test strips strip Commonly known as:  PRODIGY NO CODING Patient test glucose 3 times daily. ICD E11.9  
  
 * PRODIGY NO CODING strip Generic drug:  glucose blood VI test strips TEST BLOOD SUGAR 3 TIMES DAILY. (ICD E11.9) hydrocortisone 5 mg tablet Commonly known as:  CORTEF  
TAKE 3 TABLETS IN THE MORNING AND TAKE 1 TABLET IN THE EVENING. insulin degludec 100 unit/mL (3 mL) Inpn Commonly known as:  TRESIBA FLEXTOUCH U-100 Inject 50 units daily  
  
 levothyroxine 88 mcg tablet Commonly known as:  SYNTHROID  
TAKE 1 TABLET BY MOUTH DAILY BEFORE BREAKFAST. Liraglutide 0.6 mg/0.1 mL (18 mg/3 mL) Pnij Commonly known as:  VICTOZA  
1.8mg daily  
  
 lisinopril 40 mg tablet Commonly known as:  PRINIVIL, ZESTRIL  
TAKE 1 TABLET BY MOUTH EVERY MORNING.  
  
 meloxicam 15 mg tablet Commonly known as:  MOBIC Take 15 mg by mouth daily. metFORMIN  mg tablet Commonly known as:  GLUCOPHAGE XR Take 1 Tab by mouth two (2) times daily (with meals). nitrofurantoin (macrocrystal-monohydrate) 100 mg capsule Commonly known as:  MACROBID One BID WITH FOOD  Indications: BACTERIAL URINARY TRACT INFECTION  
  
 oxybutynin 5 mg tablet Commonly known as:  DITROPAN  
  
 trospium 20 mg tablet Commonly known as:  [place] TAKE 1 TABLET BY MOUTH TWICE A DAY. * Notice: This list has 2 medication(s) that are the same as other medications prescribed for you. Read the directions carefully, and ask your doctor or other care provider to review them with you. We Performed the Following BASIC METABOLIC PANEL (7) [41187 CPT(R)] HEMOGLOBIN A1C WITH EAG [51056 CPT(R)]  DIABETES FOOT EXAM [[facility]] LIPID PANEL [86898 CPT(R)] TSH AND FREE T4 [84997 CPT(R)] Follow-up Instructions Return in about 4 months (around 7/27/2018). Patient Instructions Continue current medication doses. Avoid fruit juices and any beverages with sugar. Try to drink mostly water and unsweet tea (okay to add a pack of Splenda). Avoid cookies and desserts. Introducing [facility]! Dear [place] Angel: Thank you for requesting a [employer] account. Our records indicate that you already have an active [employer] account. You can access your account anytime at [email or web address] Did you know that you can access your hospital and ER discharge instructions at any time in [employer]? You can also review all of your test results from your hospital stay or ER visit. Additional Information If you have questions, please visit the Frequently Asked Questions section of the [employer] website at [email or web address] Remember, [employer] is NOT to be used for urgent needs. For medical emergencies, dial 911. Now available from your iPhone and Android! Please provide this summary of care documentation to your next provider. Your primary care clinician is listed as Jeet Pascual. If you have any questions after today's visit, please call [phone].

## 2018-03-28 LAB
BUN SERPL-MCNC: 26 MG/DL (ref 8–27)
BUN/CREAT SERPL: 21 (ref 12–28)
CHLORIDE SERPL-SCNC: 102 MMOL/L (ref 96–106)
CHOLEST SERPL-MCNC: 100 MG/DL (ref 100–199)
CO2 SERPL-SCNC: 25 MMOL/L (ref 18–29)
CREAT SERPL-MCNC: 1.26 MG/DL (ref 0.57–1)
EST. AVERAGE GLUCOSE BLD GHB EST-MCNC: 209 MG/DL
GFR SERPLBLD CREATININE-BSD FMLA CKD-EPI: 41 ML/MIN/1.73
GFR SERPLBLD CREATININE-BSD FMLA CKD-EPI: 47 ML/MIN/1.73
GLUCOSE SERPL-MCNC: 94 MG/DL (ref 65–99)
HBA1C MFR BLD: 8.9 % (ref 4.8–5.6)
HDLC SERPL-MCNC: 52 MG/DL
LDLC SERPL CALC-MCNC: 33 MG/DL (ref 0–99)
POTASSIUM SERPL-SCNC: 4.4 MMOL/L (ref 3.5–5.2)
SODIUM SERPL-SCNC: 143 MMOL/L (ref 134–144)
T4 FREE SERPL-MCNC: 1.36 NG/DL (ref 0.82–1.77)
TRIGL SERPL-MCNC: 74 MG/DL (ref 0–149)
TSH SERPL DL<=0.005 MIU/L-ACNC: 0.09 UIU/ML (ref 0.45–4.5)
VLDLC SERPL CALC-MCNC: 15 MG/DL (ref 5–40)

## 2018-04-04 ENCOUNTER — HOSPITAL ENCOUNTER (EMERGENCY)
Age: 78
Discharge: HOME | End: 2018-04-05
Attending: EMERGENCY MEDICINE
Payer: MEDICARE

## 2018-04-04 DIAGNOSIS — R11.0 NAUSEA WITHOUT VOMITING: ICD-10-CM

## 2018-04-04 DIAGNOSIS — N39.0 URINARY TRACT INFECTION WITHOUT HEMATURIA, SITE UNSPECIFIED: Primary | ICD-10-CM

## 2018-04-04 LAB
ALBUMIN SERPL-MCNC: 2.9 G/DL (ref 3.5–5)
ALBUMIN/GLOB SERPL: 1 {RATIO} (ref 1.1–2.2)
ALP SERPL-CCNC: 53 U/L (ref 45–117)
ALT SERPL-CCNC: 18 U/L (ref 12–78)
ANION GAP SERPL CALC-SCNC: 7 MMOL/L (ref 5–15)
APPEARANCE UR: CLEAR
AST SERPL-CCNC: 21 U/L (ref 15–37)
BACTERIA URNS QL MICRO: ABNORMAL /HPF
BASOPHILS # BLD: 0.1 K/UL (ref 0–0.1)
BASOPHILS NFR BLD: 0 % (ref 0–1)
BILIRUB SERPL-MCNC: 0.3 MG/DL (ref 0.2–1)
BILIRUB UR QL: NEGATIVE
BUN SERPL-MCNC: 29 MG/DL (ref 6–20)
BUN/CREAT SERPL: 26 (ref 12–20)
CALCIUM SERPL-MCNC: 7.4 MG/DL (ref 8.5–10.1)
CHLORIDE SERPL-SCNC: 113 MMOL/L (ref 97–108)
CO2 SERPL-SCNC: 24 MMOL/L (ref 21–32)
COLOR UR: ABNORMAL
CREAT SERPL-MCNC: 1.12 MG/DL (ref 0.55–1.02)
DIFFERENTIAL METHOD BLD: ABNORMAL
EOSINOPHIL # BLD: 0.1 K/UL (ref 0–0.4)
EOSINOPHIL NFR BLD: 1 % (ref 0–7)
EPITH CASTS URNS QL MICRO: ABNORMAL /LPF
ERYTHROCYTE [DISTWIDTH] IN BLOOD BY AUTOMATED COUNT: 14 % (ref 11.5–14.5)
GLOBULIN SER CALC-MCNC: 2.8 G/DL (ref 2–4)
GLUCOSE SERPL-MCNC: 92 MG/DL (ref 65–100)
GLUCOSE UR STRIP.AUTO-MCNC: NEGATIVE MG/DL
HCT VFR BLD AUTO: 36.8 % (ref 35–47)
HGB BLD-MCNC: 11.9 G/DL (ref 11.5–16)
HGB UR QL STRIP: NEGATIVE
HYALINE CASTS URNS QL MICRO: ABNORMAL /LPF (ref 0–5)
IMM GRANULOCYTES # BLD: 0 K/UL (ref 0–0.04)
IMM GRANULOCYTES NFR BLD AUTO: 0 % (ref 0–0.5)
KETONES UR QL STRIP.AUTO: NEGATIVE MG/DL
LEUKOCYTE ESTERASE UR QL STRIP.AUTO: ABNORMAL
LYMPHOCYTES # BLD: 3.8 K/UL (ref 0.8–3.5)
LYMPHOCYTES NFR BLD: 27 % (ref 12–49)
MCH RBC QN AUTO: 27.5 PG (ref 26–34)
MCHC RBC AUTO-ENTMCNC: 32.3 G/DL (ref 30–36.5)
MCV RBC AUTO: 85.2 FL (ref 80–99)
MONOCYTES # BLD: 1 K/UL (ref 0–1)
MONOCYTES NFR BLD: 7 % (ref 5–13)
NEUTS SEG # BLD: 9.4 K/UL (ref 1.8–8)
NEUTS SEG NFR BLD: 65 % (ref 32–75)
NITRITE UR QL STRIP.AUTO: NEGATIVE
NRBC # BLD: 0 K/UL (ref 0–0.01)
NRBC BLD-RTO: 0 PER 100 WBC
PH UR STRIP: 7 [PH] (ref 5–8)
PLATELET # BLD AUTO: 317 K/UL (ref 150–400)
PMV BLD AUTO: 9.7 FL (ref 8.9–12.9)
POTASSIUM SERPL-SCNC: 3.5 MMOL/L (ref 3.5–5.1)
PROT SERPL-MCNC: 5.7 G/DL (ref 6.4–8.2)
PROT UR STRIP-MCNC: NEGATIVE MG/DL
RBC # BLD AUTO: 4.32 M/UL (ref 3.8–5.2)
RBC #/AREA URNS HPF: ABNORMAL /HPF (ref 0–5)
SODIUM SERPL-SCNC: 144 MMOL/L (ref 136–145)
SP GR UR REFRACTOMETRY: 1 (ref 1–1.03)
UA: UC IF INDICATED,UAUC: ABNORMAL
UROBILINOGEN UR QL STRIP.AUTO: 0.2 EU/DL (ref 0.2–1)
WBC # BLD AUTO: 14.4 K/UL (ref 3.6–11)
WBC URNS QL MICRO: ABNORMAL /HPF (ref 0–4)

## 2018-04-04 PROCEDURE — 81001 URINALYSIS AUTO W/SCOPE: CPT | Performed by: PHYSICIAN ASSISTANT

## 2018-04-04 PROCEDURE — 80053 COMPREHEN METABOLIC PANEL: CPT | Performed by: EMERGENCY MEDICINE

## 2018-04-04 PROCEDURE — 87086 URINE CULTURE/COLONY COUNT: CPT | Performed by: PHYSICIAN ASSISTANT

## 2018-04-04 PROCEDURE — 85025 COMPLETE CBC W/AUTO DIFF WBC: CPT | Performed by: PHYSICIAN ASSISTANT

## 2018-04-04 PROCEDURE — 77030038269 HC DRN EXT URIN PURWCK BARD -A

## 2018-04-04 PROCEDURE — 99285 EMERGENCY DEPT VISIT HI MDM: CPT

## 2018-04-04 PROCEDURE — 87186 SC STD MICRODIL/AGAR DIL: CPT | Performed by: PHYSICIAN ASSISTANT

## 2018-04-04 PROCEDURE — 74011250636 HC RX REV CODE- 250/636: Performed by: PHYSICIAN ASSISTANT

## 2018-04-04 PROCEDURE — 96360 HYDRATION IV INFUSION INIT: CPT

## 2018-04-04 PROCEDURE — 87077 CULTURE AEROBIC IDENTIFY: CPT | Performed by: PHYSICIAN ASSISTANT

## 2018-04-04 PROCEDURE — 36415 COLL VENOUS BLD VENIPUNCTURE: CPT | Performed by: EMERGENCY MEDICINE

## 2018-04-04 RX ORDER — CEPHALEXIN 250 MG/1
500 CAPSULE ORAL
Status: COMPLETED | OUTPATIENT
Start: 2018-04-04 | End: 2018-04-05

## 2018-04-04 RX ORDER — CEPHALEXIN 500 MG/1
500 CAPSULE ORAL 2 TIMES DAILY
Qty: 14 CAP | Refills: 0 | Status: SHIPPED | OUTPATIENT
Start: 2018-04-04 | End: 2018-04-11

## 2018-04-04 RX ORDER — ONDANSETRON 4 MG/1
4 TABLET, ORALLY DISINTEGRATING ORAL
Qty: 10 TAB | Refills: 0 | Status: SHIPPED | OUTPATIENT
Start: 2018-04-04 | End: 2018-04-26

## 2018-04-04 RX ADMIN — SODIUM CHLORIDE 1000 ML: 900 INJECTION, SOLUTION INTRAVENOUS at 21:27

## 2018-04-04 NOTE — ED PROVIDER NOTES
EMERGENCY DEPARTMENT HISTORY AND PHYSICAL EXAM      Date: 4/4/2018  Patient Name: Romy Alvarez     I have seen and evaluated this patient in the Express Care portion of triage for NVD, pt is incontinent at baseline and normally wears Depends. Pt is also tearful at baseline as well because  recently passed away and she is being transferred to a different living section at her nursing home. The patients care will begin now and orders have been placed. This patient will be seen and provided further care in the Emergency Room. Written by Mary Kay Elaine, a scribe for TATIANA Fall.    History of Presenting Illness     Chief Complaint   Patient presents with    Nausea     resident at [facility], was found on the wrong unit/hurst; nausea all day, 4 mg zofran given IV en route with improvement of sx's; hx of confusion, but denies dementia, , 1 episode of diarrhea this morning, urinary and bowel incontinence at baseline    Other     pt's  just passed last weekend, in the process of moving her from self care to an assisted living section of [facility], which has been causing her some stress       History Provided By: Patient    HPI: Romy Alvarez, 68 y.o. female with PMHx significant for HTN, DM, GERD, TIA, adrenal insufficiency, adrenalectomy, appendectomy, cholecystectomy, presents ambulatory to the ED with for evaluation for an acute onset of confusion among other sxs including nausea and diarrhea. Per staff at [facility] where pt lives, pt was found wandering in a different hurst. Pt is currently complaining of an acute onset of mild to moderate nausea all day. She notes having a \"runny\" BM/diarrhea. Pt also notes having chills, mild cough and mild SOB today. She denies any vomiting, but notes having an episode of reflux. Pt notes finding some improvement with zofran. She denies any dysuria or urinary frequency.  Pt does report feeling very distressed as her  has passed away this past weekend. She states she has been eating fine since. Pt denies other sxs or modifying factors. Social hx: -Tobacco, -EtOH, -Drugs    PCP: Dagmar Jose MD    There are no other complaints, changes, or physical findings at this time. Current Facility-Administered Medications   Medication Dose Route Frequency Provider Last Rate Last Dose    cephALEXin (KEFLEX) capsule 500 mg  500 mg Oral NOW Jarad Proctor MD         Current Outpatient Prescriptions   Medication Sig Dispense Refill    cephALEXin (KEFLEX) 500 mg capsule Take 1 Cap by mouth two (2) times a day for 7 days. 14 Cap 0    ondansetron (ZOFRAN ODT) 4 mg disintegrating tablet Take 1 Tab by mouth every eight (8) hours as needed for Nausea. 10 Tab 0    cranberry 500 mg capsule Take 500 mg by mouth daily.  amLODIPine (NORVASC) 5 mg tablet TAKE 1 TABLET BY MOUTH DAILY. 30 Tab 3    lisinopril (PRINIVIL, ZESTRIL) 40 mg tablet TAKE 1 TABLET BY MOUTH EVERY MORNING. 90 Tab 3    insulin degludec (TRESIBA FLEXTOUCH U-100) 100 unit/mL (3 mL) inpn Inject 50 units daily 9 Pen 5    fludrocortisone (FLORINEF) 0.1 mg tablet TAKE 1 TABLET BY MOUTH DAILY. 90 Tab 3    atorvastatin (LIPITOR) 80 mg tablet TAKE 1 TABLET BY MOUTH NIGHTLY. 90 Tab 3    levothyroxine (SYNTHROID) 88 mcg tablet TAKE 1 TABLET BY MOUTH DAILY BEFORE BREAKFAST. 30 Tab 5    gabapentin (NEURONTIN) 300 mg capsule TAKE 1 CAPSULE BY MOUTH NIGTLY. 90 Cap 3    FLUoxetine (PROZAC) 20 mg capsule TAKE 1 CAPSULE BY MOUTH NIGHTLY. 90 Cap 3    hydrocortisone (CORTEF) 5 mg tablet TAKE 3 TABLETS IN THE MORNING AND TAKE 1 TABLET IN THE EVENING.  120 Tab 10    PRODIGY NO CODING strip TEST BLOOD SUGAR 3 TIMES DAILY. (ICD E11.9) 150 Strip 3    nitrofurantoin, macrocrystal-monohydrate, (MACROBID) 100 mg capsule One BID WITH FOOD  Indications: BACTERIAL URINARY TRACT INFECTION 10 Cap 0    metFORMIN ER (GLUCOPHAGE XR) 500 mg tablet Take 1 Tab by mouth two (2) times daily (with meals). 180 Tab 3    Liraglutide (VICTOZA) 0.6 mg/0.1 mL (18 mg/3 mL) sub-q pen 1.8mg daily 9 mL 5    furosemide (LASIX) 40 mg tablet Take 1 Tab by mouth daily. 90 Tab 3    trospium (SANCTURA) 20 mg tablet TAKE 1 TABLET BY MOUTH TWICE A DAY. 3    CULTURELLE PROBIOTICS 10 billion cell -200 mg cpSP   99    ESTRACE 0.01 % (0.1 mg/gram) vaginal cream   3    oxybutynin (DITROPAN) 5 mg tablet   2    glucose blood VI test strips (PRODIGY NO CODING) strip Patient test glucose 3 times daily. ICD E11.9 150 Strip 1    aspirin 81 mg chewable tablet Take 1 Tab by mouth daily. 100 Tab 3    meloxicam (MOBIC) 15 mg tablet Take 15 mg by mouth daily.  colestipol (COLESTID) 1 gram tablet Take 1 g by mouth two (2) times a day.          Past History     Past Medical History:  Past Medical History:   Diagnosis Date    Arthritis osteoporosis    Cataract     Diabetes ([facility] [address]     Endocrine disease 0    adrenal insufficiency - bilat adrenalectomy    GERD (gastroesophageal reflux disease)     H/O Cushing disease     Hypertension     Other ill-defined conditions(799.89)     elizabeth's disease    Stroke ([facility] [address] 2016    TIA 2011 & L occipial stroke 4/2016    Thyroid disease hypothyroid    UTI (lower urinary tract infection)        Past Surgical History:  Past Surgical History:   Procedure Laterality Date    ENDOCRINE SURGERY PROC UNLISTED      bilat adrenalectomy in 1972    HX APPENDECTOMY      HX CATARACT REMOVAL      ON the left    HX CHOLECYSTECTOMY      HX CHOLECYSTECTOMY  2000    HX HYSTERECTOMY      HX OTHER SURGICAL  1972    adrenal gland removed 1970s    HX TONSILLECTOMY         Family History:  Family History   Problem Relation Age of Onset    Cancer Mother     Diabetes Mother     Stroke Mother     Psychiatric Disorder Mother     Dementia Mother     Headache Mother     Heart Disease Father     Psychiatric Disorder Father     Stroke Father     Asthma Sister     Diabetes Sister     Asthma Brother  Heart Disease Brother        Social History:  Social History   Substance Use Topics    Smoking status: Never Smoker    Smokeless tobacco: Never Used    Alcohol use No       Allergies: Allergies   Allergen Reactions    Amoxicillin Unknown (comments)    Erythromycin Rash and Unknown (comments)     fever         Review of Systems   Review of Systems   Constitutional: Negative for chills and fever. HENT: Negative for congestion, ear pain, rhinorrhea, sore throat and trouble swallowing. Eyes: Negative for visual disturbance. Respiratory: Negative for cough, chest tightness and shortness of breath. Cardiovascular: Negative for chest pain and palpitations. Gastrointestinal: Positive for diarrhea and nausea. Negative for abdominal pain, blood in stool, constipation and vomiting. Genitourinary: Negative for decreased urine volume, difficulty urinating, dysuria and frequency. Musculoskeletal: Negative for back pain and neck pain. Skin: Negative for color change and rash. Neurological: Negative for dizziness, weakness, light-headedness and headaches. Psychiatric/Behavioral: Positive for confusion. Physical Exam   Physical Exam   Constitutional: She is oriented to person, place, and time. Vital signs are normal. She appears well-developed and well-nourished. She does not appear ill. No distress. HENT:   Mouth/Throat: Oropharynx is clear and moist.   Eyes: Conjunctivae are normal.   Neck: Neck supple. Cardiovascular: Normal rate and regular rhythm. Pulmonary/Chest: Effort normal and breath sounds normal. No accessory muscle usage. No respiratory distress. Abdominal: Soft. She exhibits no distension. There is no tenderness. Lymphadenopathy:     She has no cervical adenopathy. Neurological: She is alert and oriented to person, place, and time. She has normal strength. No cranial nerve deficit or sensory deficit. Skin: Skin is warm and dry.    Nursing note and vitals reviewed. Diagnostic Study Results     Labs -     Recent Results (from the past 12 hour(s))   CBC WITH AUTOMATED DIFF    Collection Time: 04/04/18  9:27 PM   Result Value Ref Range    WBC 14.4 (H) 3.6 - 11.0 K/uL    RBC 4.32 3.80 - 5.20 M/uL    HGB 11.9 11.5 - 16.0 g/dL    HCT 36.8 35.0 - 47.0 %    MCV 85.2 80.0 - 99.0 FL    MCH 27.5 26.0 - 34.0 PG    MCHC 32.3 30.0 - 36.5 g/dL    RDW 14.0 11.5 - 14.5 %    PLATELET 349 328 - 980 K/uL    MPV 9.7 8.9 - 12.9 FL    NRBC 0.0 0  WBC    ABSOLUTE NRBC 0.00 0.00 - 0.01 K/uL    NEUTROPHILS 65 32 - 75 %    LYMPHOCYTES 27 12 - 49 %    MONOCYTES 7 5 - 13 %    EOSINOPHILS 1 0 - 7 %    BASOPHILS 0 0 - 1 %    IMMATURE GRANULOCYTES 0 0.0 - 0.5 %    ABS. NEUTROPHILS 9.4 (H) 1.8 - 8.0 K/UL    ABS. LYMPHOCYTES 3.8 (H) 0.8 - 3.5 K/UL    ABS. MONOCYTES 1.0 0.0 - 1.0 K/UL    ABS. EOSINOPHILS 0.1 0.0 - 0.4 K/UL    ABS. BASOPHILS 0.1 0.0 - 0.1 K/UL    ABS. IMM.  GRANS. 0.0 0.00 - 0.04 K/UL    DF AUTOMATED     URINALYSIS W/ REFLEX CULTURE    Collection Time: 04/04/18  9:58 PM   Result Value Ref Range    Color YELLOW/STRAW      Appearance CLEAR CLEAR      Specific gravity 1.005 1.003 - 1.030      pH (UA) 7.0 5.0 - 8.0      Protein NEGATIVE  NEG mg/dL    Glucose NEGATIVE  NEG mg/dL    Ketone NEGATIVE  NEG mg/dL    Bilirubin NEGATIVE  NEG      Blood NEGATIVE  NEG      Urobilinogen 0.2 0.2 - 1.0 EU/dL    Nitrites NEGATIVE  NEG      Leukocyte Esterase LARGE (A) NEG      WBC 20-50 0 - 4 /hpf    RBC 0-5 0 - 5 /hpf    Epithelial cells FEW FEW /lpf    Bacteria 4+ (A) NEG /hpf    UA:UC IF INDICATED URINE CULTURE ORDERED (A) CNI      Hyaline cast 0-2 0 - 5 /lpf   METABOLIC PANEL, COMPREHENSIVE    Collection Time: 04/04/18 10:00 PM   Result Value Ref Range    Sodium 144 136 - 145 mmol/L    Potassium 3.5 3.5 - 5.1 mmol/L    Chloride 113 (H) 97 - 108 mmol/L    CO2 24 21 - 32 mmol/L    Anion gap 7 5 - 15 mmol/L    Glucose 92 65 - 100 mg/dL    BUN 29 (H) 6 - 20 MG/DL    Creatinine 1.12 (H) 0.55 - 1.02 MG/DL    BUN/Creatinine ratio 26 (H) 12 - 20      GFR est AA 57 (L) >60 ml/min/1.73m2    GFR est non-AA 47 (L) >60 ml/min/1.73m2    Calcium 7.4 (L) 8.5 - 10.1 MG/DL    Bilirubin, total 0.3 0.2 - 1.0 MG/DL    ALT (SGPT) 18 12 - 78 U/L    AST (SGOT) 21 15 - 37 U/L    Alk. phosphatase 53 45 - 117 U/L    Protein, total 5.7 (L) 6.4 - 8.2 g/dL    Albumin 2.9 (L) 3.5 - 5.0 g/dL    Globulin 2.8 2.0 - 4.0 g/dL    A-G Ratio 1.0 (L) 1.1 - 2.2         Radiologic Studies -   No orders to display         Medical Decision Making   I am the first provider for this patient. I reviewed the vital signs, available nursing notes, past medical history, past surgical history, family history and social history. Vital Signs-Reviewed the patient's vital signs. Patient Vitals for the past 12 hrs:   Temp Pulse Resp BP SpO2   04/04/18 1939 98.4 °F (36.9 °C) 73 16 152/75 94 %       Records Reviewed: Old Medical Records    Provider Notes (Medical Decision Making):   Pt brought in for confusion and nausea all day. She may have a GI/viral illness. Will check for metabolic abnormalities and UTI. Pt states she recently lost her  and is under a lot of stress. May be having a grievance rxn. ED Course:   Initial assessment performed. The patients presenting problems have been discussed, and they are in agreement with the care plan formulated and outlined with them. I have encouraged them to ask questions as they arise throughout their visit. Critical Care Time: 0    Disposition:  DISCHARGE NOTE  11:10 PM  The patient has been re-evaluated and is ready for discharge. Reviewed available results with patient. Counseled pt on diagnosis and care plan. Pt has expressed understanding, and all questions have been answered. Pt agrees with plan and agrees to F/U as recommended, or return to the ED if their sxs worsen.  Discharge instructions have been provided and explained to the pt, along with reasons to return to the ED.      PLAN:  1. Current Discharge Medication List      START taking these medications    Details   cephALEXin (KEFLEX) 500 mg capsule Take 1 Cap by mouth two (2) times a day for 7 days. Qty: 14 Cap, Refills: 0      ondansetron (ZOFRAN ODT) 4 mg disintegrating tablet Take 1 Tab by mouth every eight (8) hours as needed for Nausea. Qty: 10 Tab, Refills: 0           2. Follow-up Information     Follow up With Details Comments Contact Ginna Campos MD Schedule an appointment as soon as possible for a visit in 2 days  [address] [place] [address] [facility]  [address]  [phone]          Return to ED if worse     Diagnosis     Clinical Impression:   1. Urinary tract infection without hematuria, site unspecified    2. Nausea without vomiting        Attestations: This note is prepared by Chris Krishnamurthy, acting as Scribe for Elvin Belle MD.    The scribe's documentation has been prepared under my direction and personally reviewed by me in its entirety. I confirm that the note above accurately reflects all work, treatment, procedures, and medical decision making performed by me. Elvin Belle MD         This note will not be viewable in [address]

## 2018-04-05 VITALS
WEIGHT: 172 LBS | BODY MASS INDEX: 31.46 KG/M2 | OXYGEN SATURATION: 94 % | HEART RATE: 70 BPM | RESPIRATION RATE: 18 BRPM | SYSTOLIC BLOOD PRESSURE: 116 MMHG | DIASTOLIC BLOOD PRESSURE: 60 MMHG | TEMPERATURE: 98.2 F

## 2018-04-05 PROCEDURE — 74011250637 HC RX REV CODE- 250/637: Performed by: EMERGENCY MEDICINE

## 2018-04-05 RX ADMIN — CEPHALEXIN 500 MG: 250 CAPSULE ORAL at 00:19

## 2018-04-05 NOTE — ED NOTES
Cleaned from urine incontinence at this time Purewick replaced. Patient instructed will remain in ED until case management can arrange transport back to assisted living since she lives alone. Daughter was unable to be reached. Report given to [facility].

## 2018-04-05 NOTE — ED NOTES
Bedside and Verbal shift change report given to [facility] (oncoming nurse) by Rigo Bloom (offgoing nurse). Report included the following information SBAR, ED Summary, Intake/Output, MAR and Recent Results. Monitor x 3. Call bell in reach. Bed in lowest position, with side rails up x 2. Pt updated on plan of care. Waiting on CM to see patient this am. A&Ox2.

## 2018-04-05 NOTE — PROGRESS NOTES
CM consulted re: [facility]ASPER needs. CM met with pt, pt's caregiver, introduced self, role. Pt's caregiver verbalized understanding. Pt was unable to tell this CM where she lives or where is is currently. CM spoke with pt's caregiver re: needs. Caregiver stated pt is currently living in independent living at [facility], but feels the pt needs to be in a memory care unit due to wandering and confusion. CM called pt's daughter, Onesimo Sheth [phone], re: pt's placement. CM introduced self, role. Dee Dee Lemus verbalized understanding. Dee Dee Lemus stated she is looking into moving the pt into a different [facility] for her continued needs. CM offered education re: [facility] vs. Memory care units. Dee Dee Lemus verbalized understanding. CM encouraged Dee Dee Lemus to look at both units when she tours facility to see which units will best meet the pt's needs. Dee Dee Lemus verbalized understanding and agreement. Dee Dee Lemus had no further questions or needs at this time. Pt's caregiver to provide transportation for pt to return to [facility]. CM informed nursing staff of transportation. CM to continue to remain available for support, discharge planning as needed. Care Management Interventions  Palliative Care Criteria Met (RRAT>21 & CHF Dx)?: No  Mode of Transport at Discharge:  Other (see comment)  Transition of Care Consult (CM Consult): Discharge Planning  Discharge Durable Medical Equipment: No  Physical Therapy Consult: No  Occupational Therapy Consult: No  Speech Therapy Consult: No  Current Support Network: Assisted Living  Confirm Follow Up Transport: Family  Plan discussed with Pt/Family/Caregiver: Yes  Discharge Location  Discharge Placement: Assisted Living     ROBERTO Corado  [facility] Manager  [phone]

## 2018-04-05 NOTE — ED NOTES
Unable to contact daughter who is next of kin for her patient. [employer] states patient lives in the independent area with no staff to receive patient tonight.

## 2018-04-05 NOTE — ED NOTES
Bedside and Verbal shift change report given to [address] [place] [address] (oncoming nurse) by Sameer Bhat (offgoing nurse). Report included the following information SBAR, ED Summary, Intake/Output, MAR and Recent Results. Monitor x 3. Call bell in reach. Bed in lowest position, with side rails up x 2. Pt updated on plan of care. Resting at this time. PureWic remains in place. Awaiting CM to assess patient.

## 2018-04-05 NOTE — DISCHARGE INSTRUCTIONS
Nausea and Vomiting: Care Instructions  Your Care Instructions    When you are nauseated, you may feel weak and sweaty and notice a lot of saliva in your mouth. Nausea often leads to vomiting. Most of the time you do not need to worry about nausea and vomiting, but they can be signs of other illnesses. Two common causes of nausea and vomiting are stomach flu and food poisoning. Nausea and vomiting from viral stomach flu will usually start to improve within 24 hours. Nausea and vomiting from food poisoning may last from 12 to 48 hours. The doctor has checked you carefully, but problems can develop later. If you notice any problems or new symptoms, get medical treatment right away. Follow-up care is a key part of your treatment and safety. Be sure to make and go to all appointments, and call your doctor if you are having problems. It's also a good idea to know your test results and keep a list of the medicines you take. How can you care for yourself at home? · To prevent dehydration, drink plenty of fluids, enough so that your urine is light yellow or clear like water. Choose water and other caffeine-free clear liquids until you feel better. If you have kidney, heart, or liver disease and have to limit fluids, talk with your doctor before you increase the amount of fluids you drink. · Rest in bed until you feel better. · When you are able to eat, try clear soups, mild foods, and liquids until all symptoms are gone for 12 to 48 hours. Other good choices include dry toast, crackers, cooked cereal, and gelatin dessert, such as Jell-O. When should you call for help? Call 911 anytime you think you may need emergency care. For example, call if:  ? · You passed out (lost consciousness). ?Call your doctor now or seek immediate medical care if:  ? · You have symptoms of dehydration, such as:  ¨ Dry eyes and a dry mouth. ¨ Passing only a little dark urine.   ¨ Feeling thirstier than usual.   ? · You have new or worsening belly pain. ? · You have a new or higher fever. ? · You vomit blood or what looks like coffee grounds. ? Watch closely for changes in your health, and be sure to contact your doctor if:  ? · You have ongoing nausea and vomiting. ? · Your vomiting is getting worse. ? · Your vomiting lasts longer than 2 days. ? · You are not getting better as expected. Where can you learn more? Go to http://love-beth.info/. Enter 25 922168 in the search box to learn more about \"Nausea and Vomiting: Care Instructions. \"  Current as of: March 20, 2017  Content Version: 11.4  © 3255-8843 [employer]. Care instructions adapted under license by [employer] (which disclaims liability or warranty for this information). If you have questions about a medical condition or this instruction, always ask your healthcare professional. [place] [address] any warranty or liability for your use of this information. Urinary Tract Infection in Women: Care Instructions  Your Care Instructions    A urinary tract infection, or UTI, is a general term for an infection anywhere between the kidneys and the urethra (where urine comes out). Most UTIs are bladder infections. They often cause pain or burning when you urinate. UTIs are caused by bacteria and can be cured with antibiotics. Be sure to complete your treatment so that the infection goes away. Follow-up care is a key part of your treatment and safety. Be sure to make and go to all appointments, and call your doctor if you are having problems. It's also a good idea to know your test results and keep a list of the medicines you take. How can you care for yourself at home? · Take your antibiotics as directed. Do not stop taking them just because you feel better. You need to take the full course of antibiotics. · Drink extra water and other fluids for the next day or two.  This may help wash out the bacteria that are causing the infection. (If you have kidney, heart, or liver disease and have to limit fluids, talk with your doctor before you increase your fluid intake.)  · Avoid drinks that are carbonated or have caffeine. They can irritate the bladder. · Urinate often. Try to empty your bladder each time. · To relieve pain, take a hot bath or lay a heating pad set on low over your lower belly or genital area. Never go to sleep with a heating pad in place. To prevent UTIs  · Drink plenty of water each day. This helps you urinate often, which clears bacteria from your system. (If you have kidney, heart, or liver disease and have to limit fluids, talk with your doctor before you increase your fluid intake.)  · Urinate when you need to. · Urinate right after you have sex. · Change sanitary pads often. · Avoid douches, bubble baths, feminine hygiene sprays, and other feminine hygiene products that have deodorants. · After going to the bathroom, wipe from front to back. When should you call for help? Call your doctor now or seek immediate medical care if:  ? · Symptoms such as fever, chills, nausea, or vomiting get worse or appear for the first time. ? · You have new pain in your back just below your rib cage. This is called flank pain. ? · There is new blood or pus in your urine. ? · You have any problems with your antibiotic medicine. ? Watch closely for changes in your health, and be sure to contact your doctor if:  ? · You are not getting better after taking an antibiotic for 2 days. ? · Your symptoms go away but then come back. Where can you learn more? Go to http://love-beth.info/. Enter A507 in the search box to learn more about \"Urinary Tract Infection in Women: Care Instructions. \"  Current as of: May 12, 2017  Content Version: 11.4  © 0757-2746 [employer].  Care instructions adapted under license by [employer] (which disclaims liability or warranty for this information). If you have questions about a medical condition or this instruction, always ask your healthcare professional. [place] [address] any warranty or liability for your use of this information.

## 2018-04-05 NOTE — ED NOTES
Pt daughter [facility] [phone] (c), called for patient update. Will like to speak CM today.

## 2018-04-05 NOTE — ED NOTES
Patient cleaned from urinary incontience at this time. New sheets and chux given. Straight cath completed at this time and urine sent to lab.

## 2018-04-05 NOTE — ED NOTES
Katherine Yañez placed to help collect urine due to patient's repeated occurrences of urinary incontinence.

## 2018-04-06 ENCOUNTER — PATIENT OUTREACH (OUTPATIENT)
Dept: INTERNAL MEDICINE CLINIC | Age: 78
End: 2018-04-06

## 2018-04-06 NOTE — PROGRESS NOTES
Follow up call regarding ED visit 4/5/18. C/o nausea and one episode of diarrhea. Labs indicated UTI (4+ bacteria, positive leucocytes). Discharged from ED on seven day course of keflex. Spoke with daughter, Celi Norris (on HIPPA). Patient lives at [facility] and has an aid who comes in daily to help with ADL's and make sure patient took medication. Family agrees patient is in need of more assistance and is working on transitioning patient into assisted living at the facility. Pharmacy at [facility] fills prescriptions. Unable to complete med rec with daughter but able to confirm pharmacy filled and patient is taking as prescribed. Reviewed s/s of worsening infection that need to be reported to PCP. Celi Norris verbalized understanding and will call for worsening symptoms or no improvement at end of abx course.

## 2018-04-07 LAB
BACTERIA SPEC CULT: ABNORMAL
CC UR VC: ABNORMAL
SERVICE CMNT-IMP: ABNORMAL

## 2018-04-11 RX ORDER — LIRAGLUTIDE 6 MG/ML
INJECTION SUBCUTANEOUS
Qty: 9 ML | Refills: 5 | Status: SHIPPED | OUTPATIENT
Start: 2018-04-11 | End: 2018-06-06 | Stop reason: SDUPTHER

## 2018-04-23 DIAGNOSIS — Z79.4 TYPE 2 DIABETES MELLITUS WITH STAGE 3 CHRONIC KIDNEY DISEASE, WITH LONG-TERM CURRENT USE OF INSULIN (HCC): ICD-10-CM

## 2018-04-23 DIAGNOSIS — N39.0 RECURRENT UTI: Primary | Chronic | ICD-10-CM

## 2018-04-23 DIAGNOSIS — E11.22 TYPE 2 DIABETES MELLITUS WITH STAGE 3 CHRONIC KIDNEY DISEASE, WITH LONG-TERM CURRENT USE OF INSULIN (HCC): ICD-10-CM

## 2018-04-23 DIAGNOSIS — N18.30 TYPE 2 DIABETES MELLITUS WITH STAGE 3 CHRONIC KIDNEY DISEASE, WITH LONG-TERM CURRENT USE OF INSULIN (HCC): ICD-10-CM

## 2018-04-23 RX ORDER — POTASSIUM &MAGNESIUM ASPARTATE 250-250 MG
500 CAPSULE ORAL DAILY
Qty: 100 CAP | Refills: 3 | Status: SHIPPED | OUTPATIENT
Start: 2018-04-23 | End: 2019-01-01

## 2018-04-23 RX ORDER — LACTOBACILLUS RHAMNOSUS GG 10B CELL
1 CAPSULE ORAL 2 TIMES DAILY
Qty: 100 CAP | Refills: 5 | Status: SHIPPED | OUTPATIENT
Start: 2018-04-23 | End: 2019-05-23 | Stop reason: SDUPTHER

## 2018-04-23 NOTE — TELEPHONE ENCOUNTER
From: Len Brumfield  To: Mima Alpers, MD  Sent: 4/23/2018 9:22 AM EDT  Subject: Medication Renewal Request    Original authorizing provider: Mima Alpers, MD Tobey Murrain would like a refill of the following medications:  furosemide (LASIX) 40 mg tablet Mima Alpers, MD]  metFORMIN ER (GLUCOPHAGE XR) 500 mg tablet Mima Alpers, MD]  hydrocortisone (CORTEF) 5 mg tablet Mima Alpers, MD]  levothyroxine (SYNTHROID) 88 mcg tablet Mima Alpers, MD]  fludrocortisone (FLORINEF) 0.1 mg tablet Mima Alpers, MD]  lisinopril (PRINIVIL, ZESTRIL) 40 mg tablet Mima Alpers, MD]    Preferred pharmacy: [facility]    Comment:  Please also include prescriptions for a Probiotic and Cranberry. Colestipol HCL 1 GM Tablet also seems to be missing from the above list. Prior Pharmacy has closed. New pharmacy will be [facility]. They will package medications in blister packs and need scripts for all meds including over the counter items as mentioned above.     Medication renewals requested in this message routed to other providers:  aspirin 81 mg chewable tablet Angella Soares MD]  gabapentin (NEURONTIN) 300 mg capsule Angella Soares MD]  FLUoxetine (PROZAC) 20 mg capsule Angella Soares MD]  atorvastatin (LIPITOR) 80 mg tablet Angella Soares MD]  amLODIPine (NORVASC) 5 mg tablet Angella Soares MD]

## 2018-04-24 RX ORDER — LEVOTHYROXINE SODIUM 88 UG/1
88 TABLET ORAL
Qty: 90 TAB | Refills: 3 | Status: SHIPPED | OUTPATIENT
Start: 2018-04-24 | End: 2018-07-31 | Stop reason: DRUGHIGH

## 2018-04-24 RX ORDER — FUROSEMIDE 40 MG/1
40 TABLET ORAL DAILY
Qty: 90 TAB | Refills: 3 | Status: SHIPPED | OUTPATIENT
Start: 2018-04-24 | End: 2019-05-17 | Stop reason: SDUPTHER

## 2018-04-24 RX ORDER — FLUDROCORTISONE ACETATE 0.1 MG/1
0.1 TABLET ORAL DAILY
Qty: 90 TAB | Refills: 3 | Status: SHIPPED | OUTPATIENT
Start: 2018-04-24 | End: 2019-05-20 | Stop reason: SDUPTHER

## 2018-04-24 RX ORDER — METFORMIN HYDROCHLORIDE 500 MG/1
500 TABLET, EXTENDED RELEASE ORAL 2 TIMES DAILY WITH MEALS
Qty: 180 TAB | Refills: 3 | Status: SHIPPED | OUTPATIENT
Start: 2018-04-24 | End: 2019-05-20 | Stop reason: SDUPTHER

## 2018-04-24 RX ORDER — HYDROCORTISONE 5 MG/1
TABLET ORAL
Qty: 120 TAB | Refills: 10 | Status: SHIPPED | OUTPATIENT
Start: 2018-04-24 | End: 2019-05-20 | Stop reason: SDUPTHER

## 2018-04-24 RX ORDER — LISINOPRIL 40 MG/1
40 TABLET ORAL DAILY
Qty: 90 TAB | Refills: 3 | Status: SHIPPED | OUTPATIENT
Start: 2018-04-24 | End: 2018-08-03 | Stop reason: SDUPTHER

## 2018-04-26 ENCOUNTER — OFFICE VISIT (OUTPATIENT)
Dept: INTERNAL MEDICINE CLINIC | Age: 78
End: 2018-04-26

## 2018-04-26 ENCOUNTER — HOSPITAL ENCOUNTER (OUTPATIENT)
Dept: LAB | Age: 78
Discharge: HOME | End: 2018-04-26
Payer: MEDICARE

## 2018-04-26 VITALS
RESPIRATION RATE: 16 BRPM | OXYGEN SATURATION: 97 % | BODY MASS INDEX: 32.39 KG/M2 | TEMPERATURE: 97.6 F | DIASTOLIC BLOOD PRESSURE: 78 MMHG | HEART RATE: 70 BPM | WEIGHT: 176 LBS | SYSTOLIC BLOOD PRESSURE: 120 MMHG | HEIGHT: 62 IN

## 2018-04-26 DIAGNOSIS — Z11.1 SCREENING-PULMONARY TB: ICD-10-CM

## 2018-04-26 DIAGNOSIS — N18.30 TYPE 2 DIABETES MELLITUS WITH STAGE 3 CHRONIC KIDNEY DISEASE, WITH LONG-TERM CURRENT USE OF INSULIN (HCC): ICD-10-CM

## 2018-04-26 DIAGNOSIS — Z79.4 TYPE 2 DIABETES MELLITUS WITH STAGE 3 CHRONIC KIDNEY DISEASE, WITH LONG-TERM CURRENT USE OF INSULIN (HCC): ICD-10-CM

## 2018-04-26 DIAGNOSIS — Z02.2 ENCOUNTER FOR EXAMINATION FOR ADMISSION TO ASSISTED LIVING FACILITY: Primary | ICD-10-CM

## 2018-04-26 DIAGNOSIS — E24.0 CUSHING DISEASE (HCC): Chronic | ICD-10-CM

## 2018-04-26 DIAGNOSIS — I10 ESSENTIAL HYPERTENSION, BENIGN: Chronic | ICD-10-CM

## 2018-04-26 DIAGNOSIS — E03.4 HYPOTHYROIDISM DUE TO ACQUIRED ATROPHY OF THYROID: Chronic | ICD-10-CM

## 2018-04-26 DIAGNOSIS — Z87.440 HISTORY OF RECURRENT UTIS: ICD-10-CM

## 2018-04-26 DIAGNOSIS — E09.22 DRUG OR CHEMICAL INDUCED DIABETES MELLITUS WITH STAGE 3 CHRONIC KIDNEY DISEASE, WITH LONG-TERM CURRENT USE OF INSULIN: ICD-10-CM

## 2018-04-26 DIAGNOSIS — Z79.4 DRUG OR CHEMICAL INDUCED DIABETES MELLITUS WITH STAGE 3 CHRONIC KIDNEY DISEASE, WITH LONG-TERM CURRENT USE OF INSULIN: ICD-10-CM

## 2018-04-26 DIAGNOSIS — E11.22 TYPE 2 DIABETES MELLITUS WITH STAGE 3 CHRONIC KIDNEY DISEASE, WITH LONG-TERM CURRENT USE OF INSULIN (HCC): ICD-10-CM

## 2018-04-26 DIAGNOSIS — N18.30 DRUG OR CHEMICAL INDUCED DIABETES MELLITUS WITH STAGE 3 CHRONIC KIDNEY DISEASE, WITH LONG-TERM CURRENT USE OF INSULIN: ICD-10-CM

## 2018-04-26 DIAGNOSIS — F32.A DEPRESSION, UNSPECIFIED DEPRESSION TYPE: Chronic | ICD-10-CM

## 2018-04-26 PROCEDURE — 80053 COMPREHEN METABOLIC PANEL: CPT

## 2018-04-26 PROCEDURE — 86480 TB TEST CELL IMMUN MEASURE: CPT

## 2018-04-26 PROCEDURE — 36415 COLL VENOUS BLD VENIPUNCTURE: CPT

## 2018-04-26 PROCEDURE — 81001 URINALYSIS AUTO W/SCOPE: CPT

## 2018-04-26 PROCEDURE — 85025 COMPLETE CBC W/AUTO DIFF WBC: CPT

## 2018-04-26 PROCEDURE — 84439 ASSAY OF FREE THYROXINE: CPT

## 2018-04-26 PROCEDURE — 84443 ASSAY THYROID STIM HORMONE: CPT

## 2018-04-26 RX ORDER — CIPROFLOXACIN 500 MG/1
TABLET ORAL
Refills: 0 | COMMUNITY
Start: 2018-04-19 | End: 2018-05-01 | Stop reason: ALTCHOICE

## 2018-04-26 NOTE — MR AVS SNAPSHOT
[address] [facility], [address] [place] 
[phone] Patient: Eugene Oro MRN: [id number] IUA:2[birth year]19[birth year][id number] Visit Information Date & Time Provider Department Dept. Phone Encounter #  
 4/26/2018  9:20 AM [facility] [phone] [id number] Your Appointments 7/31/2018  9:10 AM  
ROUTINE CARE with MD Donovan [facility]: f/u diabetes cp0.00  
 [address] [facility] [address] [place]  
[address] [facility] [address] [id number] Upcoming Health Maintenance Date Due ZOSTER VACCINE AGE 60> 4/29/2000 Bone Densitometry (Dexa) Screening 6/29/2005 MEDICARE YEARLY EXAM 4/19/2018 EYE EXAM RETINAL OR DILATED Q1 9/20/2018 HEMOGLOBIN A1C Q6M 9/27/2018 GLAUCOMA SCREENING Q2Y 10/12/2018 MICROALBUMIN Q1 12/5/2018 FOOT EXAM Q1 3/27/2019 LIPID PANEL Q1 3/27/2019 DTaP/Tdap/Td series (2 - Tdap) 3/30/2020 Allergies as of 4/26/2018  Review Complete On: 4/26/2018 By: Lanre Shin NP Severity Noted Reaction Type Reactions Amoxicillin  10/13/2016    Unknown (comments) Erythromycin  04/22/2011    Rash, Unknown (comments) fever Current Immunizations  Reviewed on 10/18/2017 Name Date DTaP 3/30/2010 Influenza High Dose Vaccine PF 10/18/2017 Influenza Vaccine 10/30/2015 Influenza Vaccine Whole 10/15/2010 Pneumococcal Conjugate (PCV-13) 11/14/2017 ZZZ-RETIRED (DO NOT USE) Pneumococcal Vaccine (Unspecified Type) 10/15/2009 Not reviewed this visit You Were Diagnosed With   
  
 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  
 32.19 kg/m 2 1.87 m 2 Preferred Pharmacy Pharmacy Name Phone [address] [place] [address] [phone] Your Updated Medication List  
  
   
This list is accurate as of 4/26/18 10:38 AM.  Always use your most recent med list. amLODIPine 5 mg tablet Commonly known as:  Loly Lyndon Take 1 Tab by mouth daily. aspirin 81 mg chewable tablet Take 1 Tab by mouth daily. atorvastatin 80 mg tablet Commonly known as:  LIPITOR Take 1 Tab by mouth daily. colestipol 1 gram tablet Commonly known as:  COLESTID Take 1 g by mouth two (2) times a day. cranberry 500 mg capsule Take 1 Cap by mouth daily. CULTURELLE PROBIOTICS 10 billion cell -200 mg Cpsp Generic drug:  Lactobac. rhamnosus GG-inulin Take 1 Cap by mouth two (2) times a day. ESTRACE 0.01 % (0.1 mg/gram) vaginal cream  
Generic drug:  estradiol  
  
 fludrocortisone 0.1 mg tablet Commonly known as:  FLORINEF Take 1 Tab by mouth daily. FLUoxetine 20 mg capsule Commonly known as:  PROzac Take 1 Cap by mouth daily. furosemide 40 mg tablet Commonly known as:  LASIX Take 1 Tab by mouth daily. gabapentin 300 mg capsule Commonly known as:  NEURONTIN Take 1 Cap by mouth daily. * glucose blood VI test strips strip Commonly known as:  PRODIGY NO CODING Patient test glucose 3 times daily. ICD E11.9  
  
 * PRODIGY NO CODING strip Generic drug:  glucose blood VI test strips TEST BLOOD SUGAR 3 TIMES DAILY. (ICD E11.9) hydrocortisone 5 mg tablet Commonly known as:  CORTEF  
TAKE 3 TABLETS IN THE MORNING AND TAKE 1 TABLET IN THE EVENING  
  
 insulin degludec 100 unit/mL (3 mL) Inpn Commonly known as:  TRESIBA FLEXTOUCH U-100 Inject 50 units daily  
  
 levothyroxine 88 mcg tablet Commonly known as:  SYNTHROID Take 1 Tab by mouth Daily (before breakfast). lisinopril 40 mg tablet Commonly known as:  [place] Take 1 Tab by mouth daily. meloxicam 15 mg tablet Commonly known as:  MOBIC Take 15 mg by mouth daily. metFORMIN  mg tablet Commonly known as:  GLUCOPHAGE XR Take 1 Tab by mouth two (2) times daily (with meals). oxybutynin 5 mg tablet Commonly known as:  DITROPAN  
  
 trospium 20 mg tablet Commonly known as:  [place] TAKE 1 TABLET BY MOUTH TWICE A DAY. VICTOZA 3-EDGARDO 0.6 mg/0.1 mL (18 mg/3 mL) Pnij Generic drug:  Liraglutide INJECT 1.8 MG DAILY AS DIRECTED. * Notice: This list has 2 medication(s) that are the same as other medications prescribed for you. Read the directions carefully, and ask your doctor or other care provider to review them with you. We Performed the Following CBC WITH AUTOMATED DIFF [21330 CPT(R)] CULTURE, URINE [id number] CPT(R)] METABOLIC PANEL, COMPREHENSIVE [70549 CPT(R)] QUANTIFERON TB GOLD [[id number] Custom] TSH RFX ON ABNORMAL TO FREE T4 [[id number] Custom] URINALYSIS W/ RFLX MICROSCOPIC [52535 CPT(R)] Patient Instructions Please get a copy of the Advance Directive and a \"Do Not Resusitate\" order to us and one will need to go to the [facility]! Dear Carlos A tompkins: Thank you for requesting a [employer] account. Our records indicate that you already have an active [employer] account. You can access your account anytime at [email or web address] Did you know that you can access your hospital and ER discharge instructions at any time in [employer]? You can also review all of your test results from your hospital stay or ER visit. Additional Information If you have questions, please visit the Frequently Asked Questions section of the [employer] website at [email or web address]; to [email or web address] Remember, [employer] is NOT to be used for urgent needs. For medical emergencies, dial 911. Now available from your iPhone and Android! Please provide this summary of care documentation to your next provider. Your primary care clinician is listed as Kyler Spann. If you have any questions after today's visit, please call [phone].

## 2018-04-26 NOTE — PROGRESS NOTES
1. Have you been to the ER, urgent care clinic since your last visit? Hospitalized since your last visit? \[facility]\"" ER 4/4/2018 for UTI. 2. Have you seen or consulted any other health care providers outside of the [address] [place] [address] since your last visit? Include any pap smears or colon screening. No     Chief Complaint   Patient presents with    Hip Pain     right hip pain for the past month; has gotten worse recently.  Form Completion     patient needs forms for assisted living filled out.       Not fasting

## 2018-04-26 NOTE — PATIENT INSTRUCTIONS
Please get a copy of the Advance Directive and a \"Do Not Resusitate\" order to us and one will need to go to the [facility]

## 2018-04-26 NOTE — PROGRESS NOTES
69 yo female in to have forms completed for entrance in to [facility]. She also reports R Hip Pain which has been present for a week, but recently getting worse. On review of the chart, she had XR in 2016 (pelvis and frog-leg view of L hip) which showed no abnormality. Discomfort is worse with weight bearing, and she occasionally trips (thinks she drags her toe). She reports brief light-headedness with getting to standing position - this passes quickly. Bowel fx is good. Appetite is good. Her son reports she gets up in middle of night, watches TV for awhile, then goes back to sleep. She has been occasionally \"getting lost\" when coming back from the dining csatle (currently living at [facility]). Her short term memory is failing; this seems especially bad on days her blood sugar is elevated. She is not good about water intake; she likes to drink soft drinks. She denies chest pain or dyspnea. Neither she nor her son with her today can verify what medications she is taking. They are brought to her in \"blister packs\" from the pharmacy which recently became [facility]. Patient has depression, and her   about 6 weeks ago; this has seemed to be something she has a sudden recall for which will start her into grieving (per her son here with her today). She was hospitalized in early April with an E. Coli UTI. This was treated with Macrobid.      PE: Overweight, elderly WF is alert and oriented   She is accompanied by her oldest son/child (he was not listed on her permission to release info to, so we got that done first)   Weight is stable   BP - 120/78   Heart - RRR   Lungs - clear   LEs - no pre-tibial pitting    Imp: Encounter to complete forms for entrance into [facility]   TB screening   Hx Bilat Adrenalectomy/Cushings Disorder   HTN   Type 2 DM - status on Insulin - under care of Dr. Radha Hoffmann, Endocrinologist   Stage 3 Renal Disease   Depression   Hypothyroidism   Recent UTI treated    Plan: Contact with Alex by ELIF Vicente - med list faxed to us - this list is cross-checked with our chart med list with a number of meds missing - son will take these lists home cross-check (reminder message also sent by My Chart)   Labs today   Encouraged patient to drink more water and less sodas   She will return to see Dr. Milli Wang on May 21  ___________________________  Expected course of current diagnosed problem(s) as well as expected progression and possible complications, and desired follow up with provider are discussed with patient. Patient is encouraged to be back in touch with any questions or concerns. Patient expresses understanding of plan of care. Patient is given AVS which includes diagnoses, current medications, vitals.

## 2018-04-27 LAB
ALBUMIN SERPL-MCNC: 4 G/DL (ref 3.5–4.8)
ALBUMIN/GLOB SERPL: 1.8 {RATIO} (ref 1.2–2.2)
ALP SERPL-CCNC: 53 IU/L (ref 39–117)
ALT SERPL-CCNC: 15 IU/L (ref 0–32)
APPEARANCE UR: CLEAR
AST SERPL-CCNC: 18 IU/L (ref 0–40)
BACTERIA #/AREA URNS HPF: ABNORMAL /[HPF]
BASOPHILS # BLD AUTO: 0.1 X10E3/UL (ref 0–0.2)
BASOPHILS NFR BLD AUTO: 0 %
BILIRUB SERPL-MCNC: 0.4 MG/DL (ref 0–1.2)
BILIRUB UR QL STRIP: NEGATIVE
BUN SERPL-MCNC: 28 MG/DL (ref 8–27)
BUN/CREAT SERPL: 24 (ref 12–28)
CALCIUM SERPL-MCNC: 9.1 MG/DL (ref 8.7–10.3)
CASTS URNS QL MICRO: ABNORMAL /LPF
CHLORIDE SERPL-SCNC: 104 MMOL/L (ref 96–106)
CO2 SERPL-SCNC: 25 MMOL/L (ref 18–29)
COLOR UR: YELLOW
CREAT SERPL-MCNC: 1.18 MG/DL (ref 0.57–1)
EOSINOPHIL # BLD AUTO: 0.1 X10E3/UL (ref 0–0.4)
EOSINOPHIL NFR BLD AUTO: 1 %
EPI CELLS #/AREA URNS HPF: ABNORMAL /HPF
ERYTHROCYTE [DISTWIDTH] IN BLOOD BY AUTOMATED COUNT: 14.9 % (ref 12.3–15.4)
GFR SERPLBLD CREATININE-BSD FMLA CKD-EPI: 45 ML/MIN/1.73
GFR SERPLBLD CREATININE-BSD FMLA CKD-EPI: 51 ML/MIN/1.73
GLOBULIN SER CALC-MCNC: 2.2 G/DL (ref 1.5–4.5)
GLUCOSE SERPL-MCNC: 54 MG/DL (ref 65–99)
GLUCOSE UR QL: NEGATIVE
HCT VFR BLD AUTO: 39.5 % (ref 34–46.6)
HGB BLD-MCNC: 12.8 G/DL (ref 11.1–15.9)
HGB UR QL STRIP: NEGATIVE
IMM GRANULOCYTES # BLD: 0 X10E3/UL (ref 0–0.1)
IMM GRANULOCYTES NFR BLD: 0 %
INTERPRETATION: NORMAL
KETONES UR QL STRIP: NEGATIVE
LEUKOCYTE ESTERASE UR QL STRIP: ABNORMAL
LYMPHOCYTES # BLD AUTO: 2.2 X10E3/UL (ref 0.7–3.1)
LYMPHOCYTES NFR BLD AUTO: 19 %
Lab: NORMAL
MCH RBC QN AUTO: 28.2 PG (ref 26.6–33)
MCHC RBC AUTO-ENTMCNC: 32.4 G/DL (ref 31.5–35.7)
MCV RBC AUTO: 87 FL (ref 79–97)
MICRO URNS: ABNORMAL
MONOCYTES # BLD AUTO: 0.7 X10E3/UL (ref 0.1–0.9)
MONOCYTES NFR BLD AUTO: 6 %
NEUTROPHILS # BLD AUTO: 8.7 X10E3/UL (ref 1.4–7)
NEUTROPHILS NFR BLD AUTO: 74 %
NITRITE UR QL STRIP: POSITIVE
PH UR STRIP: 5.5 [PH] (ref 5–7.5)
PLATELET # BLD AUTO: 349 X10E3/UL (ref 150–379)
POTASSIUM SERPL-SCNC: 3.9 MMOL/L (ref 3.5–5.2)
PROT SERPL-MCNC: 6.2 G/DL (ref 6–8.5)
PROT UR QL STRIP: NEGATIVE
RBC # BLD AUTO: 4.54 X10E6/UL (ref 3.77–5.28)
RBC #/AREA URNS HPF: ABNORMAL /HPF
SODIUM SERPL-SCNC: 144 MMOL/L (ref 134–144)
SP GR UR: 1.01 (ref 1–1.03)
T4 FREE SERPL-MCNC: 1.53 NG/DL (ref 0.82–1.77)
TSH SERPL DL<=0.005 MIU/L-ACNC: 0.05 UIU/ML (ref 0.45–4.5)
UROBILINOGEN UR STRIP-MCNC: 0.2 MG/DL (ref 0.2–1)
WBC # BLD AUTO: 11.7 X10E3/UL (ref 3.4–10.8)
WBC #/AREA URNS HPF: ABNORMAL /HPF

## 2018-04-30 ENCOUNTER — TELEPHONE (OUTPATIENT)
Dept: INTERNAL MEDICINE CLINIC | Age: 78
End: 2018-04-30

## 2018-04-30 LAB
ANNOTATION COMMENT IMP: NORMAL
GAMMA INTERFERON BACKGROUND BLD IA-ACNC: 0.04 IU/ML
M TB IFN-G BLD-IMP: NEGATIVE
M TB IFN-G CD4+ BCKGRND COR BLD-ACNC: 0.09 IU/ML
M TB IFN-G CD4+ T-CELLS BLD-ACNC: 0.13 IU/ML
MITOGEN IGNF BLD-ACNC: 6.21 IU/ML
QUANTIFERON INCUBATION: NORMAL
SERVICE CMNT-IMP: NORMAL

## 2018-04-30 NOTE — TELEPHONE ENCOUNTER
Patient's son Carlos Mora called for multiple reasons    1. He wants to know if patient's labs have come back for her admission to assisted living. 2.He wants to know if patient really needs to be taking the liptor and the prozac. 3. He wants to know if there are any other medications that the patient currently takes that she really doesn't need. He wants to reduce the amount of medications that she is taking. 4. He is also contacting the patient's pharmacy about getting better instructions for patient's medications. Specifically the times of day that she should be taking her medications.     Please call Andrez Mcmanus back at [phone]

## 2018-05-01 ENCOUNTER — TELEPHONE (OUTPATIENT)
Dept: INTERNAL MEDICINE CLINIC | Age: 78
End: 2018-05-01

## 2018-05-01 NOTE — TELEPHONE ENCOUNTER
Spoke with Neil Butcher, patient's daughter. Discussed medications some of which are being filled at [address] [place] [address] while her Insulins (Victoza and [place]) are being filled at [facility].  I cross-checked the med lists, looked up the meds in question, and it appears some of them are \"old\" from 2014 & 2016, so I removed them from the list; these include Oxybutinin, Colestipol and Mobic. Reviewed recent lab abnormalities. Thyroid under care of Dr. Nate Amos. Urinary abnormal indicates UTI. She was hospitalized earlier in April with UTI, then saw Dr. Cleveland Eric at [facility] and Alma Vanegas NP. She is to follow up with them. Plan: I will forward recent Urinalysis result to them     Discussed Advance Medical Directive. The patient's son is in the process of getting this done.

## 2018-05-03 ENCOUNTER — TELEPHONE (OUTPATIENT)
Dept: ENDOCRINOLOGY | Age: 78
End: 2018-05-03

## 2018-05-03 NOTE — TELEPHONE ENCOUNTER
----- Message from Aurelia Robertson sent at 5/3/2018  1:38 PM EDT -----  Regarding: Dr. Baumann Sale request for a chon sheikh from the practice to speak with the nurse about the pt's Rx for \"Ferisode\". Best contact number is [phone].

## 2018-05-04 ENCOUNTER — PATIENT OUTREACH (OUTPATIENT)
Dept: INTERNAL MEDICINE CLINIC | Age: 78
End: 2018-05-04

## 2018-05-04 NOTE — TELEPHONE ENCOUNTER
Spoke to Mikaela Lind yesterday but I was unable to give him any information pertaining to his mother due to  Pt's HIPPA 's from. He later went on to say that his mom does not have enough of the Furosemide to last until her next refill. I then called the pt's pharmacy and spoke to Naya and was told jacqueline a 30 day rx was filled for Furosemide on April 24. I then call and left a message for Den who is on pt's HIPPA form for a return call.

## 2018-05-04 NOTE — PROGRESS NOTES
Nurse Navigator noting no ED or inpatient hospitalizations within the last 30 days. No notification of any medical needs and/or barriers to current treatment plan. Patient has attended follow up appointment with PCP as directed. Compliant with scheduling follow up appt with PCP treatment plan as prescribed while hospitalized. Nurse Navigator will be available as medical needs arise. The current hospital episode will be closed at this time.

## 2018-05-09 ENCOUNTER — TELEPHONE (OUTPATIENT)
Dept: INTERNAL MEDICINE CLINIC | Age: 78
End: 2018-05-09

## 2018-05-09 RX ORDER — BLOOD SUGAR DIAGNOSTIC
STRIP MISCELLANEOUS
Qty: 150 STRIP | Refills: 3 | Status: SHIPPED | OUTPATIENT
Start: 2018-05-09 | End: 2018-05-12 | Stop reason: SDUPTHER

## 2018-05-09 NOTE — TELEPHONE ENCOUNTER
Pt is moving into  [address] [place] [address]  On [id number] and leti was told by pt's son that dr Brody Heck had given him verbal orders to discontinue Prozac and gabapentin and leti would like those orders in writing can you fax the orders to [phone] you can contact leti at [phone] if you have any question

## 2018-05-14 RX ORDER — BLOOD SUGAR DIAGNOSTIC
STRIP MISCELLANEOUS
Qty: 100 STRIP | Refills: 5 | Status: SHIPPED | OUTPATIENT
Start: 2018-05-14 | End: 2019-01-01

## 2018-05-14 RX ORDER — PEN NEEDLE, DIABETIC 31 GX5/16"
NEEDLE, DISPOSABLE MISCELLANEOUS
Qty: 30 PEN NEEDLE | Refills: 3 | Status: SHIPPED | OUTPATIENT
Start: 2018-05-14 | End: 2019-01-01 | Stop reason: SDUPTHER

## 2018-05-31 ENCOUNTER — OFFICE VISIT (OUTPATIENT)
Dept: INTERNAL MEDICINE CLINIC | Age: 78
End: 2018-05-31

## 2018-05-31 VITALS
HEIGHT: 62 IN | OXYGEN SATURATION: 97 % | WEIGHT: 172.1 LBS | HEART RATE: 73 BPM | SYSTOLIC BLOOD PRESSURE: 130 MMHG | RESPIRATION RATE: 16 BRPM | DIASTOLIC BLOOD PRESSURE: 80 MMHG | TEMPERATURE: 98.1 F | BODY MASS INDEX: 31.67 KG/M2

## 2018-05-31 DIAGNOSIS — Z13.820 OSTEOPOROSIS SCREENING: ICD-10-CM

## 2018-05-31 DIAGNOSIS — E11.22 TYPE 2 DIABETES MELLITUS WITH STAGE 3 CHRONIC KIDNEY DISEASE, WITH LONG-TERM CURRENT USE OF INSULIN (HCC): ICD-10-CM

## 2018-05-31 DIAGNOSIS — N18.30 TYPE 2 DIABETES MELLITUS WITH STAGE 3 CHRONIC KIDNEY DISEASE, WITH LONG-TERM CURRENT USE OF INSULIN (HCC): ICD-10-CM

## 2018-05-31 DIAGNOSIS — Z00.00 MEDICARE ANNUAL WELLNESS VISIT, SUBSEQUENT: Primary | ICD-10-CM

## 2018-05-31 DIAGNOSIS — Z78.0 MENOPAUSE: ICD-10-CM

## 2018-05-31 DIAGNOSIS — Z79.4 TYPE 2 DIABETES MELLITUS WITH STAGE 3 CHRONIC KIDNEY DISEASE, WITH LONG-TERM CURRENT USE OF INSULIN (HCC): ICD-10-CM

## 2018-05-31 DIAGNOSIS — I10 ESSENTIAL HYPERTENSION, BENIGN: Chronic | ICD-10-CM

## 2018-05-31 NOTE — PROGRESS NOTES
Subjective:     Chief Complaint   Patient presents with    Medication Evaluation     She  is a 68y.o. year old female who presents for evaluation. Recently moved in to assisted living. Here for follow up of low blood sugars. Has a history of adrenal insufficiency / Cushing's / HTN / CVA / DM      Historical Data    Past Medical History:   Diagnosis Date    Arthritis osteoporosis    Cataract     Diabetes ([facility] [address]     Endocrine disease 1972    adrenal insufficiency - bilat adrenalectomy    GERD (gastroesophageal reflux disease)     H/O Cushing disease     Hypertension     Other ill-defined conditions(799.89)     elizabeth's disease    Stroke ([facility] [address] 2016    TIA 2011 & L occipial stroke 4/2016    Thyroid disease hypothyroid    UTI (lower urinary tract infection)         Past Surgical History:   Procedure Laterality Date    ENDOCRINE SURGERY PROC UNLISTED Bilateral 1972    bilat adrenalectomy in 1972    HX APPENDECTOMY      HX CATARACT REMOVAL Left     ON the left    HX CHOLECYSTECTOMY      HX CHOLECYSTECTOMY  2000    HX HYSTERECTOMY      HX TONSILLECTOMY          Outpatient Encounter Prescriptions as of 5/31/2018   Medication Sig Dispense Refill    BD ULTRA-FINE MINI PEN NEEDLE 31 gauge x 3/16\" ndle USE TO INJECT TRESIBA AND VICTOZA AS DIRECTED 30 Pen Needle 3    PRODIGY NO CODING strip USE TO TEST BLOOD SUGAR 3 TIMES A  Strip 5    furosemide (LASIX) 40 mg tablet Take 1 Tab by mouth daily. 90 Tab 3    metFORMIN ER (GLUCOPHAGE XR) 500 mg tablet Take 1 Tab by mouth two (2) times daily (with meals). 180 Tab 3    hydrocortisone (CORTEF) 5 mg tablet TAKE 3 TABLETS IN THE MORNING AND TAKE 1 TABLET IN THE EVENING 120 Tab 10    levothyroxine (SYNTHROID) 88 mcg tablet Take 1 Tab by mouth Daily (before breakfast). 90 Tab 3    fludrocortisone (FLORINEF) 0.1 mg tablet Take 1 Tab by mouth daily. 90 Tab 3    lisinopril (PRINIVIL, ZESTRIL) 40 mg tablet Take 1 Tab by mouth daily.  90 Tab 3    aspirin 81 mg chewable tablet Take 1 Tab by mouth daily. 100 Tab 3    atorvastatin (LIPITOR) 80 mg tablet Take 1 Tab by mouth daily. 90 Tab 3    amLODIPine (NORVASC) 5 mg tablet Take 1 Tab by mouth daily. 30 Tab 3    cranberry 500 mg capsule Take 1 Cap by mouth daily. 100 Cap 3    CULTURELLE PROBIOTICS 10 billion cell -200 mg cpSP Take 1 Cap by mouth two (2) times a day. 100 Cap 5    VICTOZA 3-EDGARDO 0.6 mg/0.1 mL (18 mg/3 mL) pnij INJECT 1.8 MG DAILY AS DIRECTED. 9 mL 5    insulin degludec (TRESIBA FLEXTOUCH U-100) 100 unit/mL (3 mL) inpn Inject 50 units daily 9 Pen 5    trospium (SANCTURA) 20 mg tablet TAKE 1 TABLET BY MOUTH TWICE A DAY. 3    ESTRACE 0.01 % (0.1 mg/gram) vaginal cream   3    glucose blood VI test strips (PRODIGY NO CODING) strip Patient test glucose 3 times daily. ICD E11.9 150 Strip 1     No facility-administered encounter medications on file as of 5/31/2018. Allergies   Allergen Reactions    Amoxicillin Unknown (comments)    Erythromycin Rash and Unknown (comments)     fever        Social History     Social History    Marital status:      Spouse name: N/A    Number of children: N/A    Years of education: N/A     Occupational History    Not on file. Social History Main Topics    Smoking status: Never Smoker    Smokeless tobacco: Never Used    Alcohol use No    Drug use: No    Sexual activity: Not Currently     Other Topics Concern    Not on file     Social History Narrative          Review of Systems  A comprehensive review of systems was negative except for that written in the HPI. Objective:     Vitals:    05/31/18 1036   BP: 130/80   Pulse: 73   Resp: 16   Temp: 98.1 °F (36.7 °C)   TempSrc: Oral   SpO2: 97%   Weight: 172 lb 1.6 oz (78.1 kg)   Height: 5' 2\" (1.575 m)     Pleasant WF in no acute distress. Neck: Supple. Cardiac: RRR without murmurs, gallops or rubs. Lungs: Clear to auscultation. Abdomen: Benign.   Neuro: intact but with impaired cognition. ASSESSMENT / PLAN:   1. Medicare annual wellness visit, subsequent  · Review completed    2. Type 2 diabetes mellitus with stage 3 chronic kidney disease, with long-term current use of insulin (HCC)  · Advised to take snack at hs to avoid am hypoglycemic episodes. · Low glycemic index diet. · Continue metformin / Victoza  · On statin and ACEI. 3. Essential hypertension, benign  · Low salt diet. · Continue lisinopril and amlodipine. 4. Osteoporosis screening    - DEXA BONE DENSITY STUDY AXIAL; Future    5. Menopause    - DEXA BONE DENSITY STUDY AXIAL; Future    Patient Instructions   Follow a low glycemic index diet. Take a snack at bedtime. Continue your current medications. Have a bone density study done. Have the shingles shot at your pharmacy. Follow up with Dr Chey Blanchard as directed. Medicare Wellness Visit, Female    The best way to live healthy is to have a lifestyle where you eat a well-balanced diet, exercise regularly, limit alcohol use, and quit all forms of tobacco/nicotine, if applicable. Regular preventive services are another way to keep healthy. Preventive services (vaccines, screening tests, monitoring & exams) can help personalize your care plan, which helps you manage your own care. Screening tests can find health problems at the earliest stages, when they are easiest to treat. [facility] follows the current, evidence-based guidelines published by the [place] [employer] (USPSTF) when recommending preventive services for our patients. Because we follow these guidelines, sometimes recommendations change over time as research supports it. (For example, mammograms used to be recommended annually.  Even though Medicare will still pay for an annual mammogram, the newer guidelines recommend a mammogram every two years for women of average risk.)    Of course, you and your provider may decide to screen more often for some diseases, based on your risk and co-morbidities (chronic disease you are already diagnosed with). Preventive services for you include:    - Medicare offers their members a free annual wellness visit, which is time for you and your primary care provider to discuss and plan for your preventive service needs. Take advantage of this benefit every year!    -All people over age 72 should receive the recommended pneumonia vaccines. Current USPSTF guidelines recommend a series of two vaccines for the best pneumonia protection.     -All adults should have a yearly flu vaccine and a tetanus vaccine every 10 years. All adults age 61 years should receive a shingles vaccine once in their lifetime.      -A bone mass density test is recommended when a woman turns 65 to screen for osteoporosis. This test is only recommended once as a screening. Some providers will use this same test as a disease monitoring tool if you already have osteoporosis. -All adults age 38-68 years who are overweight should have a diabetes screening test once every three years.     -Other screening tests & preventive services for persons with diabetes include: an eye exam to screen for diabetic retinopathy, a kidney function test, a foot exam, and stricter control over your cholesterol.     -Cardiovascular screening for adults with routine risk involves an electrocardiogram (ECG) at intervals determined by the provider.     -Colorectal cancer screenings should be done for adults age 54-65 years with normal risk. There are a number of acceptable methods of screening for this type of cancer. Each test has its own benefits and drawbacks. Discuss with your provider what is most appropriate for you during your annual wellness visit. The different tests include: colonoscopy (considered the best screening method), a fecal occult blood test, a fecal DNA test, and sigmoidoscopy.     -Breast cancer screenings are recommended every other year for women of normal risk age 54-69 years.     -Cervical cancer screenings for women over age 72 are only recommended with certain risk factors.     -All adults born between [birth year] and 1965 should be screened once for Hepatitis C. Here is a list of your current Health Maintenance items (your personalized list of preventive services) with a due date: There are no preventive care reminders to display for this patient. Follow-up Disposition:  Return in about 4 months (around 9/30/2018) for Medication re-evaluation. Advised her to call back or return to office if symptoms worsen/change/persist.  Discussed expected course/resolution/complications of diagnosis in detail with patient. Medication risks/benefits/costs/interactions/alternatives discussed with patient. She was given an after visit summary which includes diagnoses, current medications, & vitals. She expressed understanding with the diagnosis and plan. This is the Subsequent Medicare Annual Wellness Exam, performed 12 months or more after the Initial AWV or the last Subsequent AWV    I have reviewed the patient's medical history in detail and updated the computerized patient record.      History     Past Medical History:   Diagnosis Date    Arthritis osteoporosis    Cataract     Diabetes ([facility] [address]     Endocrine disease 0    adrenal insufficiency - bilat adrenalectomy    GERD (gastroesophageal reflux disease)     H/O Cushing disease     Hypertension     Other ill-defined conditions(799.89)     elizabeth's disease    Stroke ([facility] [address] 2016    TIA 2011 & L occipial stroke 4/2016    Thyroid disease hypothyroid    UTI (lower urinary tract infection)       Past Surgical History:   Procedure Laterality Date    ENDOCRINE SURGERY PROC UNLISTED Bilateral 1972    bilat adrenalectomy in 0    HX APPENDECTOMY      HX CATARACT REMOVAL Left     ON the left    HX CHOLECYSTECTOMY      HX CHOLECYSTECTOMY  2000    HX HYSTERECTOMY      HX TONSILLECTOMY Current Outpatient Prescriptions   Medication Sig Dispense Refill    BD ULTRA-FINE MINI PEN NEEDLE 31 gauge x 3/16\" ndle USE TO INJECT TRESIBA AND VICTOZA AS DIRECTED 30 Pen Needle 3    PRODIGY NO CODING strip USE TO TEST BLOOD SUGAR 3 TIMES A  Strip 5    furosemide (LASIX) 40 mg tablet Take 1 Tab by mouth daily. 90 Tab 3    metFORMIN ER (GLUCOPHAGE XR) 500 mg tablet Take 1 Tab by mouth two (2) times daily (with meals). 180 Tab 3    hydrocortisone (CORTEF) 5 mg tablet TAKE 3 TABLETS IN THE MORNING AND TAKE 1 TABLET IN THE EVENING 120 Tab 10    levothyroxine (SYNTHROID) 88 mcg tablet Take 1 Tab by mouth Daily (before breakfast). 90 Tab 3    fludrocortisone (FLORINEF) 0.1 mg tablet Take 1 Tab by mouth daily. 90 Tab 3    lisinopril (PRINIVIL, ZESTRIL) 40 mg tablet Take 1 Tab by mouth daily. 90 Tab 3    aspirin 81 mg chewable tablet Take 1 Tab by mouth daily. 100 Tab 3    atorvastatin (LIPITOR) 80 mg tablet Take 1 Tab by mouth daily. 90 Tab 3    amLODIPine (NORVASC) 5 mg tablet Take 1 Tab by mouth daily. 30 Tab 3    cranberry 500 mg capsule Take 1 Cap by mouth daily. 100 Cap 3    CULTURELLE PROBIOTICS 10 billion cell -200 mg cpSP Take 1 Cap by mouth two (2) times a day. 100 Cap 5    VICTOZA 3-EDGARDO 0.6 mg/0.1 mL (18 mg/3 mL) pnij INJECT 1.8 MG DAILY AS DIRECTED. 9 mL 5    insulin degludec (TRESIBA FLEXTOUCH U-100) 100 unit/mL (3 mL) inpn Inject 50 units daily 9 Pen 5    trospium (SANCTURA) 20 mg tablet TAKE 1 TABLET BY MOUTH TWICE A DAY. 3    ESTRACE 0.01 % (0.1 mg/gram) vaginal cream   3    glucose blood VI test strips (PRODIGY NO CODING) strip Patient test glucose 3 times daily.  ICD E11.9 150 Strip 1     Allergies   Allergen Reactions    Amoxicillin Unknown (comments)    Erythromycin Rash and Unknown (comments)     fever     Family History   Problem Relation Age of Onset    Cancer Mother     Diabetes Mother     Stroke Mother     Psychiatric Disorder Mother     Dementia Mother    [facility] Headache Mother     Heart Disease Father     Psychiatric Disorder Father     Stroke Father     Asthma Sister     Diabetes Sister     Asthma Brother     Heart Disease Brother      Social History   Substance Use Topics    Smoking status: Never Smoker    Smokeless tobacco: Never Used    Alcohol use No     Patient Active Problem List   Diagnosis Code    TIA (transient ischemic attack) G45.9    Essential hypertension, benign I10    Recurrent UTI N39.0    RLS (restless legs syndrome) G25.81    Esophageal reflux K21.9    Cushing disease ([facility] [address] E24.0    Depression F32.9    Elevated cholesterol E78.00    Vitamin D deficiency E55.9    Adrenal insufficiency (HCC) E27.40    Hypothyroidism E03.9    Intracranial vascular stenosis I67.9    Meningioma ([facility] [address] D32.9    Cerebral infarction due to stenosis of left posterior cerebral artery ([facility]) E66.238    Unstable angina ([facility]) I20.0    Type 2 diabetes mellitus with stage 3 chronic kidney disease, with long-term current use of insulin ([facility]) E11.22, N18.3, Z79.4       Depression Risk Factor Screening:     PHQ over the last two weeks 4/26/2018   Little interest or pleasure in doing things Not at all   Feeling down, depressed or hopeless Not at all   Total Score PHQ 2 0     Alcohol Risk Factor Screening: You do not drink alcohol or very rarely. Functional Ability and Level of Safety:   Hearing Loss  The patient wears hearing aids. Activities of Daily Living  The home contains: handrails and grab bars  Patient does total self care    Fall Risk  Fall Risk Assessment, last 12 mths 4/18/2017   Able to walk? Yes   Fall in past 12 months?  No       Abuse Screen  Patient is not abused    Cognitive Screening   Evaluation of Cognitive Function:  Has your family/caregiver stated any concerns about your memory: yes  Abnormal    Patient Care Team   Patient Care Team:  Chi Nice MD as PCP - General (Internal Medicine)  Kike Eldridge MD as Consulting Provider (Endocrinology)    Assessment/Plan   Education and counseling provided:  Are appropriate based on today's review and evaluation  End-of-Life planning (with patient's consent)    Diagnoses and all orders for this visit:    1. Medicare annual wellness visit, subsequent    2. Type 2 diabetes mellitus with stage 3 chronic kidney disease, with long-term current use of insulin ([facility] [address]    3. Essential hypertension, benign    4. Osteoporosis screening  -     DEXA BONE DENSITY STUDY AXIAL; Future    5. Menopause  -     DEXA BONE DENSITY STUDY AXIAL; Future    Other orders  -     varicella-zoster recombinant, PF, (SHINGRIX, PF,) 50 mcg/0.5 mL susr injection; 0.5 mL by IntraMUSCular route once for 1 dose. Indications: PREVENTION OF HERPES ZOSTER      There are no preventive care reminders to display for this patient.

## 2018-05-31 NOTE — PROGRESS NOTES
Chief Complaint   Patient presents with    Medication Evaluation     Reviewed record in preparation for visit and have obtained necessary documentation. Identified pt with two pt identifiers(name and ). Health Maintenance Due   Topic    ZOSTER VACCINE AGE 60>     Bone Densitometry (Dexa) Screening     MEDICARE YEARLY EXAM          Chief Complaint   Patient presents with    Medication Evaluation        Wt Readings from Last 3 Encounters:   18 172 lb 1.6 oz (78.1 kg)   18 176 lb (79.8 kg)   18 172 lb (78 kg)     Temp Readings from Last 3 Encounters:   18 98.1 °F (36.7 °C) (Oral)   18 97.6 °F (36.4 °C) (Oral)   18 98.2 °F (36.8 °C)     BP Readings from Last 3 Encounters:   18 130/80   18 120/78   18 116/60     Pulse Readings from Last 3 Encounters:   18 73   18 70   18 70           Learning Assessment:  :     Learning Assessment 2017   PRIMARY LEARNER Patient   HIGHEST LEVEL OF EDUCATION - PRIMARY LEARNER  > 4 YEARS OF COLLEGE   BARRIERS PRIMARY LEARNER NONE   PRIMARY LANGUAGE ENGLISH   LEARNER PREFERENCE PRIMARY LISTENING   ANSWERED BY patient   RELATIONSHIP SELF       Depression Screening:  :     PHQ over the last two weeks 2018   Little interest or pleasure in doing things Not at all   Feeling down, depressed or hopeless Not at all   Total Score PHQ 2 0       Fall Risk Assessment:  :     Fall Risk Assessment, last 12 mths 2017   Able to walk? Yes   Fall in past 12 months? No       Abuse Screening:  :     Abuse Screening Questionnaire 2017   Do you ever feel afraid of your partner? N   Are you in a relationship with someone who physically or mentally threatens you? N   Is it safe for you to go home?  Y       Coordination of Care Questionnaire:  :     1) Have you been to an emergency room, urgent care clinic since your last visit? no   Hospitalized since your last visit? no             2) Have you seen or consulted any other health care providers outside of [address] [place] [address] since your last visit? yes  (Include any pap smears or colon screenings in this section.)    3) Do you have an Advance Directive on file? yes    4) Are you interested in receiving information on Advance Directives? NO      Patient is accompanied by nursing attendant I have received verbal consent from Chiara Deleon to discuss any/all medical information while they are present in the room. Reviewed record  In preparation for visit and have obtained necessary documentation.

## 2018-05-31 NOTE — MR AVS SNAPSHOT
[address] [facility], [address] [facility] 
[phone] Patient: Jasen Bautista MRN: [id number] [id number] Visit Information Date & Time Provider Department Dept. Phone Encounter #  
 2018 10:45 AM Josr Solis MD [facility] [phone] [id number] Follow-up Instructions Return in about 4 months (around 2018) for Medication re-evaluation. Your Appointments 2018  9:50 AM  
ROUTINE CARE with Ellie Vu MD  
[facility] [address] [place] [address]) [address] [facility] [address] [place] [id number] Upcoming Health Maintenance Date Due Bone Densitometry (Dexa) Screening 2018* ZOSTER VACCINE AGE 60> 2018* Influenza Age 5 to Adult 2018 EYE EXAM RETINAL OR DILATED Q1 2018 HEMOGLOBIN A1C Q6M 2018 GLAUCOMA SCREENING Q2Y 10/12/2018 MICROALBUMIN Q1 2018 FOOT EXAM Q1 3/27/2019 LIPID PANEL Q1 3/27/2019 MEDICARE YEARLY EXAM 2019 DTaP/Tdap/Td series (2 - Tdap) 3/30/2020 *Topic was postponed. The date shown is not the original due date. Allergies as of 2018  Review Complete On: 2018 By: Josr Solis MD  
  
 Severity Noted Reaction Type Reactions Amoxicillin  10/13/2016    Unknown (comments) Erythromycin  2011    Rash, Unknown (comments) fever Current Immunizations  Reviewed on 10/18/2017 Name Date DTaP 3/30/2010 Influenza High Dose Vaccine PF 10/18/2017 Influenza Vaccine 10/30/2015 Influenza Vaccine Whole 10/15/2010 Pneumococcal Conjugate (PCV-13) 2017 ZZZ-RETIRED (DO NOT USE) Pneumococcal Vaccine (Unspecified Type) 10/15/2009 Not reviewed this visit You Were Diagnosed With   
  
 Codes Comments Medicare annual wellness visit, subsequent    -  Primary ICD-10-CM: Z00.00 ICD-9-CM: V70.0 Type 2 diabetes mellitus with stage 3 chronic kidney disease, with long-term current use of insulin (HCC)     ICD-10-CM: E11.22, N18.3, Z79.4 ICD-9-CM: 250.40, 585.3, V58.67 Essential hypertension, benign     ICD-10-CM: I10 
ICD-9-CM: 401.1 Osteoporosis screening     ICD-10-CM: Z13.820 ICD-9-CM: V82.81 Menopause     ICD-10-CM: Z78.0 ICD-9-CM: 627.2 Vitals BP Pulse Temp Resp Height(growth percentile) Weight(growth percentile) 130/80 (BP 1 Location: Right arm, BP Patient Position: Sitting) 73 98.1 °F (36.7 °C) (Oral) 16 5' 2\" (1.575 m) 172 lb 1.6 oz (78.1 kg) SpO2 BMI OB Status Smoking Status 97% 31.48 kg/m2 Postmenopausal Never Smoker Vitals History BMI and BSA Data Body Mass Index Body Surface Area  
 31.48 kg/m 2 1.85 m 2 Preferred Pharmacy Pharmacy Name Phone [phone] [address] [phone] Your Updated Medication List  
  
   
This list is accurate as of 5/31/18 10:59 AM.  Always use your most recent med list. amLODIPine 5 mg tablet Commonly known as:  Oanh Tiago Take 1 Tab by mouth daily. aspirin 81 mg chewable tablet Take 1 Tab by mouth daily. atorvastatin 80 mg tablet Commonly known as:  LIPITOR Take 1 Tab by mouth daily. BD ULTRA-FINE MINI PEN NEEDLE 31 gauge x 3/16\" Ndle Generic drug:  Insulin Needles (Disposable) USE TO INJECT TRESIBA AND VICTOZA AS DIRECTED  
  
 cranberry 500 mg capsule Take 1 Cap by mouth daily. CULTURELLE PROBIOTICS 10 billion cell -200 mg Cpsp Generic drug:  Lactobac. rhamnosus GG-inulin Take 1 Cap by mouth two (2) times a day. ESTRACE 0.01 % (0.1 mg/gram) vaginal cream  
Generic drug:  estradiol  
  
 fludrocortisone 0.1 mg tablet Commonly known as:  FLORINEF Take 1 Tab by mouth daily. furosemide 40 mg tablet Commonly known as:  LASIX Take 1 Tab by mouth daily. * glucose blood VI test strips strip Commonly known as:  PRODIGY NO CODING Patient test glucose 3 times daily. ICD E11.9  
  
 * PRODIGY NO CODING strip Generic drug:  glucose blood VI test strips USE TO TEST BLOOD SUGAR 3 TIMES A DAY  
  
 hydrocortisone 5 mg tablet Commonly known as:  CORTEF  
TAKE 3 TABLETS IN THE MORNING AND TAKE 1 TABLET IN THE EVENING  
  
 insulin degludec 100 unit/mL (3 mL) Inpn Commonly known as:  TRESIBA FLEXTOUCH U-100 Inject 50 units daily  
  
 levothyroxine 88 mcg tablet Commonly known as:  SYNTHROID Take 1 Tab by mouth Daily (before breakfast). lisinopril 40 mg tablet Commonly known as:  Edna Nguyen Take 1 Tab by mouth daily. metFORMIN  mg tablet Commonly known as:  GLUCOPHAGE XR Take 1 Tab by mouth two (2) times daily (with meals). trospium 20 mg tablet Commonly known as:  Melendez Boutte TAKE 1 TABLET BY MOUTH TWICE A DAY. varicella-zoster recombinant (PF) 50 mcg/0.5 mL Susr injection Commonly known as:  SHINGRIX (PF)  
0.5 mL by IntraMUSCular route once for 1 dose. Indications: PREVENTION OF HERPES ZOSTER  
  
 VICTOZA 3-EDGARDO 0.6 mg/0.1 mL (18 mg/3 mL) Pnij Generic drug:  Liraglutide INJECT 1.8 MG DAILY AS DIRECTED. * Notice: This list has 2 medication(s) that are the same as other medications prescribed for you. Read the directions carefully, and ask your doctor or other care provider to review them with you. Prescriptions Printed Refills  
 varicella-zoster recombinant, PF, (SHINGRIX, PF,) 50 mcg/0.5 mL susr injection 0 Si.5 mL by IntraMUSCular route once for 1 dose. Indications: PREVENTION OF HERPES ZOSTER Class: Print Route: IntraMUSCular Follow-up Instructions Return in about 4 months (around 2018) for Medication re-evaluation. To-Do List   
 2018 Imaging:  DEXA BONE DENSITY STUDY AXIAL Patient Instructions Follow a low glycemic index diet. Take a snack at bedtime. Continue your current medications. Have a bone density study done. Have the shingles shot at your pharmacy. Follow up with Dr Dilan Chang as directed. Medicare Wellness Visit, Female The best way to live healthy is to have a lifestyle where you eat a well-balanced diet, exercise regularly, limit alcohol use, and quit all forms of tobacco/nicotine, if applicable. Regular preventive services are another way to keep healthy. Preventive services (vaccines, screening tests, monitoring & exams) can help personalize your care plan, which helps you manage your own care. Screening tests can find health problems at the earliest stages, when they are easiest to treat. [facility] follows the current, evidence-based guidelines published by the [facility] Elias Bonnie ([facility]) when recommending preventive services for our patients. Because we follow these guidelines, sometimes recommendations change over time as research supports it. (For example, mammograms used to be recommended annually. Even though Medicare will still pay for an annual mammogram, the newer guidelines recommend a mammogram every two years for women of average risk.) Of course, you and your provider may decide to screen more often for some diseases, based on your risk and co-morbidities (chronic disease you are already diagnosed with). Preventive services for you include: - Medicare offers their members a free annual wellness visit, which is time for you and your primary care provider to discuss and plan for your preventive service needs. Take advantage of this benefit every year! 
 
-All people over age 72 should receive the recommended pneumonia vaccines. Current USPSTF guidelines recommend a series of two vaccines for the best pneumonia protection. -All adults should have a yearly flu vaccine and a tetanus vaccine every 10 years. All adults age 61 years should receive a shingles vaccine once in their lifetime.   
 
-A bone mass density test is recommended when a woman turns 65 to screen for osteoporosis. This test is only recommended once as a screening. Some providers will use this same test as a disease monitoring tool if you already have osteoporosis. -All adults age 38-68 years who are overweight should have a diabetes screening test once every three years.  
 
-Other screening tests & preventive services for persons with diabetes include: an eye exam to screen for diabetic retinopathy, a kidney function test, a foot exam, and stricter control over your cholesterol.  
 
-Cardiovascular screening for adults with routine risk involves an electrocardiogram (ECG) at intervals determined by the provider.  
 
-Colorectal cancer screenings should be done for adults age 54-65 years with normal risk. There are a number of acceptable methods of screening for this type of cancer. Each test has its own benefits and drawbacks. Discuss with your provider what is most appropriate for you during your annual wellness visit. The different tests include: colonoscopy (considered the best screening method), a fecal occult blood test, a fecal DNA test, and sigmoidoscopy. -Breast cancer screenings are recommended every other year for women of normal risk age 54-69 years.  
 
-Cervical cancer screenings for women over age 72 are only recommended with certain risk factors.  
 
-All adults born between [facility] should be screened once for Hepatitis C. Here is a list of your current Health Maintenance items (your personalized list of preventive services) with a due date: There are no preventive care reminders to display for this patient. Introducing [facility]! Dear Sandrine Wong: Thank you for requesting a JotSpot account.   Our records indicate that you already have an active Azoi account. You can access your account anytime at [email or web address] Did you know that you can access your hospital and ER discharge instructions at any time in Azoi? You can also review all of your test results from your hospital stay or ER visit. Additional Information If you have questions, please visit the Frequently Asked Questions section of the Azoi website at [email or web address] Remember, Azoi is NOT to be used for urgent needs. For medical emergencies, dial 911. Now available from your iPhone and Android! Please provide this summary of care documentation to your next provider. Your primary care clinician is listed as Dagmar Jose. If you have any questions after today's visit, please call [phone].

## 2018-05-31 NOTE — PATIENT INSTRUCTIONS
Follow a low glycemic index diet. Take a snack at bedtime. Continue your current medications. Have a bone density study done. Have the shingles shot at your pharmacy. Follow up with Dr Isabel Flood as directed. Medicare Wellness Visit, Female    The best way to live healthy is to have a lifestyle where you eat a well-balanced diet, exercise regularly, limit alcohol use, and quit all forms of tobacco/nicotine, if applicable. Regular preventive services are another way to keep healthy. Preventive services (vaccines, screening tests, monitoring & exams) can help personalize your care plan, which helps you manage your own care. Screening tests can find health problems at the earliest stages, when they are easiest to treat. [facility] follows the current, evidence-based guidelines published by the [facility] Elias Nicole ([facility]) when recommending preventive services for our patients. Because we follow these guidelines, sometimes recommendations change over time as research supports it. (For example, mammograms used to be recommended annually. Even though Medicare will still pay for an annual mammogram, the newer guidelines recommend a mammogram every two years for women of average risk.)    Of course, you and your provider may decide to screen more often for some diseases, based on your risk and co-morbidities (chronic disease you are already diagnosed with). Preventive services for you include:    - Medicare offers their members a free annual wellness visit, which is time for you and your primary care provider to discuss and plan for your preventive service needs. Take advantage of this benefit every year!    -All people over age 72 should receive the recommended pneumonia vaccines. Current USPSTF guidelines recommend a series of two vaccines for the best pneumonia protection.     -All adults should have a yearly flu vaccine and a tetanus vaccine every 10 years.  All adults age 61 years should receive a shingles vaccine once in their lifetime.      -A bone mass density test is recommended when a woman turns 65 to screen for osteoporosis. This test is only recommended once as a screening. Some providers will use this same test as a disease monitoring tool if you already have osteoporosis. -All adults age 38-68 years who are overweight should have a diabetes screening test once every three years.     -Other screening tests & preventive services for persons with diabetes include: an eye exam to screen for diabetic retinopathy, a kidney function test, a foot exam, and stricter control over your cholesterol.     -Cardiovascular screening for adults with routine risk involves an electrocardiogram (ECG) at intervals determined by the provider.     -Colorectal cancer screenings should be done for adults age 54-65 years with normal risk. There are a number of acceptable methods of screening for this type of cancer. Each test has its own benefits and drawbacks. Discuss with your provider what is most appropriate for you during your annual wellness visit. The different tests include: colonoscopy (considered the best screening method), a fecal occult blood test, a fecal DNA test, and sigmoidoscopy. -Breast cancer screenings are recommended every other year for women of normal risk age 54-69 years.     -Cervical cancer screenings for women over age 72 are only recommended with certain risk factors.     -All adults born between [facility] should be screened once for Hepatitis C. Here is a list of your current Health Maintenance items (your personalized list of preventive services) with a due date: There are no preventive care reminders to display for this patient.

## 2018-06-21 ENCOUNTER — DOCUMENTATION ONLY (OUTPATIENT)
Dept: INTERNAL MEDICINE CLINIC | Age: 78
End: 2018-06-21

## 2018-06-21 NOTE — PROGRESS NOTES
Spoke to daughter and discussed sundowning. Will give her a little more time to adjust to her new setting before trying medications.   Dr Kailyn Jones

## 2018-07-05 ENCOUNTER — HOSPITAL ENCOUNTER (OUTPATIENT)
Dept: BONE DENSITY | Age: 78
Discharge: HOME | End: 2018-07-05
Attending: INTERNAL MEDICINE
Payer: MEDICARE

## 2018-07-05 DIAGNOSIS — Z78.0 MENOPAUSE: ICD-10-CM

## 2018-07-05 DIAGNOSIS — Z13.820 OSTEOPOROSIS SCREENING: ICD-10-CM

## 2018-07-05 PROCEDURE — 77080 DXA BONE DENSITY AXIAL: CPT

## 2018-07-06 ENCOUNTER — TELEPHONE (OUTPATIENT)
Dept: INTERNAL MEDICINE CLINIC | Age: 78
End: 2018-07-06

## 2018-07-06 ENCOUNTER — HOSPITAL ENCOUNTER (OUTPATIENT)
Dept: MRI IMAGING | Age: 78
Discharge: HOME | End: 2018-07-06
Attending: NURSE PRACTITIONER
Payer: MEDICARE

## 2018-07-06 ENCOUNTER — OFFICE VISIT (OUTPATIENT)
Dept: INTERNAL MEDICINE CLINIC | Age: 78
End: 2018-07-06

## 2018-07-06 ENCOUNTER — DOCUMENTATION ONLY (OUTPATIENT)
Dept: INTERNAL MEDICINE CLINIC | Age: 78
End: 2018-07-06

## 2018-07-06 VITALS
SYSTOLIC BLOOD PRESSURE: 130 MMHG | DIASTOLIC BLOOD PRESSURE: 80 MMHG | TEMPERATURE: 97 F | OXYGEN SATURATION: 98 % | BODY MASS INDEX: 31.69 KG/M2 | HEART RATE: 76 BPM | HEIGHT: 62 IN | WEIGHT: 172.2 LBS | RESPIRATION RATE: 16 BRPM

## 2018-07-06 DIAGNOSIS — F32.A DEPRESSION, UNSPECIFIED DEPRESSION TYPE: Primary | Chronic | ICD-10-CM

## 2018-07-06 DIAGNOSIS — R51.9 WORSENING HEADACHES: ICD-10-CM

## 2018-07-06 DIAGNOSIS — D32.9 MENINGIOMA (HCC): ICD-10-CM

## 2018-07-06 DIAGNOSIS — D32.9 MENINGIOMA (HCC): Primary | ICD-10-CM

## 2018-07-06 PROBLEM — F03.90 DEMENTIA (HCC): Status: ACTIVE | Noted: 2018-07-06

## 2018-07-06 PROCEDURE — 74011250636 HC RX REV CODE- 250/636: Performed by: NURSE PRACTITIONER

## 2018-07-06 PROCEDURE — 70553 MRI BRAIN STEM W/O & W/DYE: CPT

## 2018-07-06 PROCEDURE — A9585 GADOBUTROL INJECTION: HCPCS | Performed by: NURSE PRACTITIONER

## 2018-07-06 RX ORDER — NAPROXEN 500 MG/1
500 TABLET ORAL 2 TIMES DAILY WITH MEALS
COMMUNITY
End: 2019-01-01

## 2018-07-06 RX ORDER — CLOTRIMAZOLE AND BETAMETHASONE DIPROPIONATE 10; .64 MG/G; MG/G
CREAM TOPICAL 2 TIMES DAILY
COMMUNITY
End: 2019-01-01

## 2018-07-06 RX ORDER — ESCITALOPRAM OXALATE 10 MG/1
10 TABLET ORAL DAILY
Qty: 30 TAB | Refills: 2 | Status: SHIPPED | OUTPATIENT
Start: 2018-07-06 | End: 2018-07-26 | Stop reason: SDUPTHER

## 2018-07-06 RX ORDER — CEFUROXIME AXETIL 250 MG/1
250 TABLET ORAL 2 TIMES DAILY
COMMUNITY
End: 2018-10-27

## 2018-07-06 RX ORDER — FLUTICASONE PROPIONATE 50 MCG
2 SPRAY, SUSPENSION (ML) NASAL DAILY
Qty: 1 BOTTLE | Refills: 0 | Status: SHIPPED | OUTPATIENT
Start: 2018-07-06 | End: 2018-07-20

## 2018-07-06 RX ADMIN — GADOBUTROL 8 ML: 604.72 INJECTION INTRAVENOUS at 15:16

## 2018-07-06 NOTE — PROGRESS NOTES
Spoke to daughter about patient and the depression that she is experiencing, especially with all the changes that she has been through as of late ( dying, move to memory unit, etc). She has been very tearful, etc.  Will send script for Lexapro to help her through this tough time.   Dr Alejo Corn

## 2018-07-06 NOTE — PROGRESS NOTES
Chief Complaint   Patient presents with    Headache     Reviewed record in preparation for visit and have obtained necessary documentation. Identified pt with two pt identifiers(name and ). Health Maintenance Due   Topic    ZOSTER VACCINE AGE 60>     Bone Densitometry (Dexa) Screening          Chief Complaint   Patient presents with    Headache        Wt Readings from Last 3 Encounters:   18 172 lb 3.2 oz (78.1 kg)   18 172 lb 1.6 oz (78.1 kg)   18 176 lb (79.8 kg)     Temp Readings from Last 3 Encounters:   18 97 °F (36.1 °C) (Oral)   18 98.1 °F (36.7 °C) (Oral)   18 97.6 °F (36.4 °C) (Oral)     BP Readings from Last 3 Encounters:   18 130/80   18 130/80   18 120/78     Pulse Readings from Last 3 Encounters:   18 76   18 73   18 70           Learning Assessment:  :     Learning Assessment 2017   PRIMARY LEARNER Patient   HIGHEST LEVEL OF EDUCATION - PRIMARY LEARNER  > 4 YEARS OF COLLEGE   BARRIERS PRIMARY LEARNER NONE   PRIMARY LANGUAGE ENGLISH   LEARNER PREFERENCE PRIMARY LISTENING   ANSWERED BY patient   RELATIONSHIP SELF       Depression Screening:  :     PHQ over the last two weeks 2018   Little interest or pleasure in doing things Not at all   Feeling down, depressed or hopeless Not at all   Total Score PHQ 2 0       Fall Risk Assessment:  :     Fall Risk Assessment, last 12 mths 2018   Able to walk? Yes   Fall in past 12 months? No       Abuse Screening:  :     Abuse Screening Questionnaire 2017   Do you ever feel afraid of your partner? N   Are you in a relationship with someone who physically or mentally threatens you? N   Is it safe for you to go home?  Y       Coordination of Care Questionnaire:  :     1) Have you been to an emergency room, urgent care clinic since your last visit? no   Hospitalized since your last visit? no             2) Have you seen or consulted any other health care providers outside of [address] [place] [address] since your last visit? no  (Include any pap smears or colon screenings in this section.)    3) Do you have an Advance Directive on file? yes    4) Are you interested in receiving information on Advance Directives? NO      Patient is accompanied by self I have received verbal consent from Jaun Pittman to discuss any/all medical information while they are present in the room. Reviewed record  In preparation for visit and have obtained necessary documentation.

## 2018-07-06 NOTE — PROGRESS NOTES
HISTORY OF PRESENT ILLNESS  Yolande Dukes is a 66 y.o. female. Patient reports persistent headache x 3 days in right frontal region extending to top of head. She has not tried any medication for the headache. Patient's caregiver is with the patient. Patient was recently moved to the memory unit due to worsening dementia. Caregiver has mentioned increased tearfulness on a daily basis and worsening sundowning. Patient lost her  about 3 months ago. Patient was also recently taken off prozac and caregiver feels she needs this medication or another antidepressant. She also states the patient seems to be scared of the other residents in the memory care unit where she now lives. Caregiver requests the patient be put on medication for anxiety/depression. Dr. Zay Rosales spoke with the patient's daughter about a week ago and has decided to wait and see if patient adjusts to new living situation prior to restarting prozac. Patient has history of meningioma. A repeat MRI was ordered about 4 months ago by Dr. Bryan Dupree, but it was never done. Visit Vitals    /80 (BP 1 Location: Left arm, BP Patient Position: Sitting)    Pulse 76    Temp 97 °F (36.1 °C) (Oral)    Resp 16    Ht 5' 2\" (1.575 m)    Wt 172 lb 3.2 oz (78.1 kg)    SpO2 98%    BMI 31.5 kg/m2       Headache   The history is provided by the patient. This is a recurrent problem. The problem has been gradually worsening. Associated symptoms include headaches. Review of Systems   Neurological: Positive for headaches. Physical Exam   Constitutional: She is oriented to person, place, and time. She appears well-developed. HENT:   Head: Normocephalic. Nose: Mucosal edema (erythematous) present. Right sinus exhibits frontal sinus tenderness. Mouth/Throat: Uvula is midline, oropharynx is clear and moist and mucous membranes are normal.   Eyes:   Diminished pupil reactivity   Neck: Normal range of motion. Neck supple.    Cardiovascular: Normal rate and regular rhythm. Pulmonary/Chest: Effort normal and breath sounds normal.   Neurological: She is alert and oriented to person, place, and time. Skin: Skin is warm and dry. Psychiatric: She has a normal mood and affect. Her speech is normal. Cognition and memory are impaired. ASSESSMENT and PLAN    ICD-10-CM ICD-9-CM    1. Meningioma (HCC) D32.9 225.2 MRI BRAIN W WO CONT   2.  Worsening headaches R51 784.0 MRI BRAIN W WO CONT     Orders Placed This Encounter    MRI BRAIN W WO CONT    fluticasone (FLONASE) 50 mcg/actuation nasal spray    loperamide HCl (IMODIUM PO)    clotrimazole-betamethasone (LOTRISONE) topical cream    naproxen (NAPROSYN) 500 mg tablet    cefUROXime (CEFTIN) 250 mg tablet    OTHER

## 2018-07-06 NOTE — TELEPHONE ENCOUNTER
Attempted to reach Edy Mackey, patient's daughter, but there was no answer. Spoke with Gladies Boast, patient's caregiver, about MRI results. Caregiver expressed concerns about patient's depression and requested I have Dr. Maninder Crawley given Edy Mackey a call. She reports patient is crying every day and seems scared of other residents. She also recently lost her .

## 2018-07-06 NOTE — MR AVS SNAPSHOT
[address] [facility], [address] [place] 
[phone] Patient: Alexander Jones MRN: [id number] VKZ:5/06/0400 Visit Information Date & Time Provider Department Dept. Phone Encounter #  
 7/6/2018 10:45 AM [facility]  Your Appointments 7/26/2018  9:50 AM  
ROUTINE CARE with MD [facility] [address] [place] [address]  
[phone]  
  
   
 [address] [facility] [address]  
  
    
 9/20/2018 10:30 AM  
ROUTINE CARE with MD [facility] Appt Note: 4m Medication re-evaluation. [address], [address] [place]  
[address], [place] [facility] [address] Upcoming Health Maintenance Date Due ZOSTER VACCINE AGE 60> 4/29/2000 Bone Densitometry (Dexa) Screening 6/29/2005 Influenza Age 5 to Adult 8/1/2018 HEMOGLOBIN A1C Q6M 9/27/2018 MICROALBUMIN Q1 12/5/2018 FOOT EXAM Q1 3/27/2019 LIPID PANEL Q1 3/27/2019 MEDICARE YEARLY EXAM 6/1/2019 EYE EXAM RETINAL OR DILATED Q1 6/14/2019 DTaP/Tdap/Td series (2 - Tdap) 3/30/2020 GLAUCOMA SCREENING Q2Y 6/14/2020 Allergies as of 7/6/2018  Review Complete On: 7/6/2018 By: Wendy To NP Severity Noted Reaction Type Reactions Amoxicillin  10/13/2016    Unknown (comments) Erythromycin  04/22/2011    Rash, Unknown (comments) fever Current Immunizations  Reviewed on 10/18/2017 Name Date DTaP 3/30/2010 Influenza High Dose Vaccine PF 10/18/2017 Influenza Vaccine 10/30/2015 Influenza Vaccine Whole 10/15/2010 Pneumococcal Conjugate (PCV-13) 11/14/2017 ZZZ-RETIRED (DO NOT USE) Pneumococcal Vaccine (Unspecified Type) 10/15/2009 Not reviewed this visit You Were Diagnosed With   
  
 Codes Comments Meningioma ([facility] [place] [facility]    -  Primary ICD-10-CM: D32.9 ICD-9-CM: 225.2 Worsening headaches     ICD-10-CM: R51 ICD-9-CM: 372. 0 Vitals BP Pulse Temp Resp Height(growth percentile) Weight(growth percentile) 130/80 (BP 1 Location: Left arm, BP Patient Position: Sitting) 76 97 °F (36.1 °C) (Oral) 16 5' 2\" (1.575 m) 172 lb 3.2 oz (78.1 kg) SpO2 BMI OB Status Smoking Status 98% 31.5 kg/m2 Postmenopausal Never Smoker BMI and BSA Data Body Mass Index Body Surface Area 31.5 kg/m 2 1.85 m 2 Preferred Pharmacy Pharmacy Name Phone [address] [place] [address] [phone] Your Updated Medication List  
  
   
This list is accurate as of 7/6/18 11:20 AM.  Always use your most recent med list. amLODIPine 5 mg tablet Commonly known as:  Beth Blade Take 1 Tab by mouth daily. aspirin 81 mg chewable tablet Take 1 Tab by mouth daily. atorvastatin 80 mg tablet Commonly known as:  LIPITOR Take 1 Tab by mouth daily. BD ULTRA-FINE MINI PEN NEEDLE 31 gauge x 3/16\" Ndle Generic drug:  Insulin Needles (Disposable) USE TO INJECT TRESIBA AND VICTOZA AS DIRECTED  
  
 cranberry 500 mg capsule Take 1 Cap by mouth daily. CULTURELLE PROBIOTICS 10 billion cell -200 mg Cpsp Generic drug:  Lactobac. rhamnosus GG-inulin Take 1 Cap by mouth two (2) times a day. ESTRACE 0.01 % (0.1 mg/gram) vaginal cream  
Generic drug:  estradiol  
  
 fludrocortisone 0.1 mg tablet Commonly known as:  FLORINEF Take 1 Tab by mouth daily. fluticasone 50 mcg/actuation nasal spray Commonly known as:  Jo Ann Guild 2 Sprays by Both Nostrils route daily for 14 days. furosemide 40 mg tablet Commonly known as:  LASIX Take 1 Tab by mouth daily. * glucose blood VI test strips strip Commonly known as:  PRODIGY NO CODING  
 Patient test glucose 3 times daily. ICD E11.9  
  
 * PRODIGY NO CODING strip Generic drug:  glucose blood VI test strips USE TO TEST BLOOD SUGAR 3 TIMES A DAY  
  
 hydrocortisone 5 mg tablet Commonly known as:  CORTEF  
TAKE 3 TABLETS IN THE MORNING AND TAKE 1 TABLET IN THE EVENING  
  
 levothyroxine 88 mcg tablet Commonly known as:  SYNTHROID Take 1 Tab by mouth Daily (before breakfast). lisinopril 40 mg tablet Commonly known as:  Michaelyn [place] Take 1 Tab by mouth daily. metFORMIN  mg tablet Commonly known as:  GLUCOPHAGE XR Take 1 Tab by mouth two (2) times daily (with meals). TRESIBA FLEXTOUCH U-100 100 unit/mL (3 mL) Inpn Generic drug:  insulin degludec INJECT 50 UNITS SUBCUTANEOUSLY DAILY  
  
 trospium 20 mg tablet Commonly known as:  Melendez Ashton TAKE 1 TABLET BY MOUTH TWICE A DAY. VICTOZA 3-EDGARDO 0.6 mg/0.1 mL (18 mg/3 mL) Pnij Generic drug:  Liraglutide INJECT 1.8ML SUBCUTANEOUSLY DAILY * Notice: This list has 2 medication(s) that are the same as other medications prescribed for you. Read the directions carefully, and ask your doctor or other care provider to review them with you. Prescriptions Printed Refills  
 fluticasone (FLONASE) 50 mcg/actuation nasal spray 0 Si Sprays by Both Nostrils route daily for 14 days. Class: Print Route: Both Nostrils To-Do List   
 2018 Imaging:  MRI BRAIN W WO CONT   
  
 2018 2:30 PM  
  Appointment with [facility] MRI 2 at [facility] ([phone]) 1. Please bring a list or a bag of your current medications to your appointment 2. Please be sure to remove ALL hair clips, pins, extensions, etc., prior to arriving for your MRI procedure. 3. If you have any medical implants or devices, please bring associated medical card with you.  4. Bring any non [facility] films or CDs pertaining to the area being imaged with you on the day of appointment. 5. A written order with a valid diagnosis and Physicians  signature is required for all scheduled tests. 6. Check in at registration 30min before your appointment time unless you were instructed to do otherwise. Referral Information Referral ID Referred By Referred To  
  
 [id number] Treasure Chacon MOHAMUD Not Available Visits Status Start Date End Date 1 New Request 7/6/18 7/6/19 If your referral has a status of pending review or denied, additional information will be sent to support the outcome of this decision. Introducing [facility]! Dear Cristino Romero: Thank you for requesting a [employer] account. Our records indicate that you already have an active [employer] account. You can access your account anytime at [email or web address] Did you know that you can access your hospital and ER discharge instructions at any time in [employer]? You can also review all of your test results from your hospital stay or ER visit. Additional Information If you have questions, please visit the Frequently Asked Questions section of the [employer] website at [email or web address] Remember, [employer] is NOT to be used for urgent needs. For medical emergencies, dial 911. Now available from your iPhone and Android! Please provide this summary of care documentation to your next provider. Your primary care clinician is listed as Marlen Mcintosh. If you have any questions after today's visit, please call [phone].

## 2018-07-26 ENCOUNTER — OFFICE VISIT (OUTPATIENT)
Dept: ENDOCRINOLOGY | Age: 78
End: 2018-07-26

## 2018-07-26 VITALS
RESPIRATION RATE: 16 BRPM | WEIGHT: 176 LBS | BODY MASS INDEX: 32.39 KG/M2 | OXYGEN SATURATION: 97 % | HEART RATE: 70 BPM | HEIGHT: 62 IN | DIASTOLIC BLOOD PRESSURE: 57 MMHG | SYSTOLIC BLOOD PRESSURE: 104 MMHG

## 2018-07-26 DIAGNOSIS — D32.9 MENINGIOMA (HCC): ICD-10-CM

## 2018-07-26 DIAGNOSIS — E27.40 ADRENAL INSUFFICIENCY (HCC): Chronic | ICD-10-CM

## 2018-07-26 DIAGNOSIS — E03.4 HYPOTHYROIDISM DUE TO ACQUIRED ATROPHY OF THYROID: Chronic | ICD-10-CM

## 2018-07-26 DIAGNOSIS — F32.A DEPRESSION, UNSPECIFIED DEPRESSION TYPE: Chronic | ICD-10-CM

## 2018-07-26 DIAGNOSIS — Z79.4 TYPE 2 DIABETES MELLITUS WITH STAGE 3 CHRONIC KIDNEY DISEASE, WITH LONG-TERM CURRENT USE OF INSULIN (HCC): Primary | ICD-10-CM

## 2018-07-26 DIAGNOSIS — E11.22 TYPE 2 DIABETES MELLITUS WITH STAGE 3 CHRONIC KIDNEY DISEASE, WITH LONG-TERM CURRENT USE OF INSULIN (HCC): Primary | ICD-10-CM

## 2018-07-26 DIAGNOSIS — N18.30 TYPE 2 DIABETES MELLITUS WITH STAGE 3 CHRONIC KIDNEY DISEASE, WITH LONG-TERM CURRENT USE OF INSULIN (HCC): Primary | ICD-10-CM

## 2018-07-26 RX ORDER — INSULIN DEGLUDEC 100 U/ML
40 INJECTION, SOLUTION SUBCUTANEOUS DAILY
Qty: 15 ML | Refills: 3 | Status: SHIPPED | OUTPATIENT
Start: 2018-07-26 | End: 2019-02-07 | Stop reason: SDUPTHER

## 2018-07-26 RX ORDER — INSULIN LISPRO 100 [IU]/ML
INJECTION, SOLUTION INTRAVENOUS; SUBCUTANEOUS
Qty: 5 PEN | Refills: 3 | Status: SHIPPED | OUTPATIENT
Start: 2018-07-26 | End: 2019-02-07 | Stop reason: SDUPTHER

## 2018-07-26 NOTE — PROGRESS NOTES
Endocrinology Visit    Chief Complaint: Type 2 diabetes, adrenal insufficiency, hypothyroidism    History of Present Illness: Tracey Wright is a 66 y.o. female who returns for f/u of adrenal insufficiency, hypothyroidism, and type 2 diabetes mellitus. Records from her previous endocrinologist Dr Martha Kingston indicate pt had pituitary Cushings - treatment of which involved XRT (presumably to her pituitary) and BL adrenalectomy. She subsequently developed primary AI and is on hydrocortisone and fludrocortisone replacement. I saw her in initial consultation in December 2016 at which time I made several adjustments to her medication regimen as detailed by problem below. In May, we decided to try switching Novolog to Victoza + metformin to help improve her blood sugar control (A1c was 9.6%) and weight. She lost 16 lbs since and A1c had decreased to 8.0% in August. However, since then she has had daytime hyperglycemia, perhaps due to sneaking desserts. Since her last visit, she has moved to [place] [address]. Her medications are being administered for her, and the staff is checking her blood sugar 3 times/day (see recorded values on log). Current glycemic medication regimen is Victoza 1.8mg daily, metformin XR 500mg BID, and Tresiba 50 units Qam.   Home blood glucose monitoring frequency: 2-3 times/day  Glucose range at home: . Log shows lows are usually in the morning and she typically trends up as the day progresses. Known complications include CKD and neuropathy. Last eye exam was in June - no retinopathy. She does exercise, walks every day. Her food is provided at her [facility]. She does not necessarily try to restrict dietary carbohydrate intake - but today cannot seem to remember what she's been eating, could not perform 24h recall. She snacks between meals sometimes. Likes juice. She also has a history of hypothyroidism and is currently on generic levothyroxine 88 mcg daily.  This has been reduced gradually from 125 to 112 to 100, now 88. 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 She takes her medication first thing in the morning on an empty stomach and waits at least 30 minutes before eating or taking her other meds. She does umana insomnia (no trouble falling asleep, just wakes up around midnight every night). Denies palpitations, tremors, changes in bowel habits or energy level. Regarding her AI, she currently takes HC 15mg Qam, 5mg Qpm. I reduced her morning dose from 20mg to 15mg. She is also taking fludrocortisone 0.1 mg daily.  At her last visit and in April, her sodium, potassium, and renin levels were normal.     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    Depression F32.9    Elevated cholesterol E78.00    Vitamin D deficiency E55.9    Adrenal insufficiency (HCC) E27.40    Hypothyroidism E03.9    Intracranial vascular stenosis I67.9    Meningioma ([address] D32.9    Cerebral infarction due to stenosis of left posterior cerebral artery (HCC) K19.326    Unstable angina ([address] I20.0    Type 2 diabetes mellitus with stage 3 chronic kidney disease, with long-term current use of insulin ([facility]) E11.22, N18.3, Z79.4    Dementia F03.90       Past Medical History:    Past Medical History:   Diagnosis Date    Arthritis osteoporosis    Cataract     Diabetes ([address]     Endocrine disease 1972    adrenal insufficiency - bilat adrenalectomy    GERD (gastroesophageal reflux disease)     H/O Cushing disease     Hypertension     Other ill-defined conditions(799.89)     elizabeth's disease    Stroke ([facility] 2016    TIA 2011 & L occipial stroke 4/2016    Thyroid disease hypothyroid    UTI (lower urinary tract infection)        Past Surgical History:  Past Surgical History:   Procedure Laterality Date    ENDOCRINE SURGERY PROC UNLISTED Bilateral 1972    bilat adrenalectomy in 1972    HX APPENDECTOMY      HX CATARACT REMOVAL Left     ON the left    HX CHOLECYSTECTOMY      HX CHOLECYSTECTOMY  2000    HX HYSTERECTOMY      HX TONSILLECTOMY         Social History:  Social History     Social History    Marital status:      Spouse name: N/A    Number of children: N/A    Years of education: N/A     Occupational History    Not on file. Social History Main Topics    Smoking status: Never Smoker    Smokeless tobacco: Never Used    Alcohol use No    Drug use: No    Sexual activity: Not Currently     Other Topics Concern    Not on file     Social History Narrative       Family History:  Family History   Problem Relation Age of Onset   [facility] Cancer Mother     Diabetes Mother     Stroke Mother     Psychiatric Disorder Mother     Dementia Mother     Headache Mother     Heart Disease Father     Psychiatric Disorder Father     Stroke Father     Asthma Sister     Diabetes Sister     Asthma Brother     Heart Disease Brother        Medications:     Current Outpatient Prescriptions:     loperamide HCl (IMODIUM PO), Take  by mouth., Disp: , Rfl:     clotrimazole-betamethasone (LOTRISONE) topical cream, Apply  to affected area two (2) times a day., Disp: , Rfl:     naproxen (NAPROSYN) 500 mg tablet, Take 500 mg by mouth two (2) times daily (with meals). , Disp: , Rfl:     TRESIBA FLEXTOUCH U-100 100 unit/mL (3 mL) inpn, INJECT 50 UNITS SUBCUTANEOUSLY DAILY, Disp: 15 mL, Rfl: 3    VICTOZA 3-EDGARDO 0.6 mg/0.1 mL (18 mg/3 mL) pnij, INJECT 1.8ML SUBCUTANEOUSLY DAILY, Disp: 9 mL, Rfl: 3    BD ULTRA-FINE MINI PEN NEEDLE 31 gauge x 3/16\" ndle, USE TO INJECT TRESIBA AND VICTOZA AS DIRECTED, Disp: 30 Pen Needle, Rfl: 3   PRODIGY NO CODING strip, USE TO TEST BLOOD SUGAR 3 TIMES A DAY, Disp: 100 Strip, Rfl: 5    furosemide (LASIX) 40 mg tablet, Take 1 Tab by mouth daily. , Disp: 90 Tab, Rfl: 3    metFORMIN ER (GLUCOPHAGE XR) 500 mg tablet, Take 1 Tab by mouth two (2) times daily (with meals). , Disp: 180 Tab, Rfl: 3    hydrocortisone (CORTEF) 5 mg tablet, TAKE 3 TABLETS IN THE MORNING AND TAKE 1 TABLET IN THE EVENING, Disp: 120 Tab, Rfl: 10    levothyroxine (SYNTHROID) 88 mcg tablet, Take 1 Tab by mouth Daily (before breakfast). , Disp: 90 Tab, Rfl: 3    fludrocortisone (FLORINEF) 0.1 mg tablet, Take 1 Tab by mouth daily. , Disp: 90 Tab, Rfl: 3    lisinopril (PRINIVIL, ZESTRIL) 40 mg tablet, Take 1 Tab by mouth daily. , Disp: 90 Tab, Rfl: 3    aspirin 81 mg chewable tablet, Take 1 Tab by mouth daily. , Disp: 100 Tab, Rfl: 3    atorvastatin (LIPITOR) 80 mg tablet, Take 1 Tab by mouth daily. , Disp: 90 Tab, Rfl: 3    amLODIPine (NORVASC) 5 mg tablet, Take 1 Tab by mouth daily. , Disp: 30 Tab, Rfl: 3    CULTURELLE PROBIOTICS 10 billion cell -200 mg cpSP, Take 1 Cap by mouth two (2) times a day., Disp: 100 Cap, Rfl: 5    ESTRACE 0.01 % (0.1 mg/gram) vaginal cream, , Disp: , Rfl: 3    glucose blood VI test strips (PRODIGY NO CODING) strip, Patient test glucose 3 times daily. ICD E11.9, Disp: 150 Strip, Rfl: 1    cefUROXime (CEFTIN) 250 mg tablet, Take 250 mg by mouth two (2) times a day. Indications: as needed for UTI, Disp: , Rfl:     OTHER, Indications: accu-chek three times a day, Disp: , Rfl:     escitalopram oxalate (LEXAPRO) 10 mg tablet, Take 1 Tab by mouth daily. Indications: major depressive disorder, Disp: 30 Tab, Rfl: 2    cranberry 500 mg capsule, Take 1 Cap by mouth daily. , Disp: 100 Cap, Rfl: 3    trospium (SANCTURA) 20 mg tablet, TAKE 1 TABLET BY MOUTH TWICE A DAY., Disp: , Rfl: 3    Allergies:   Allergies   Allergen Reactions    Amoxicillin Unknown (comments)    Erythromycin Rash and Unknown (comments)     fever Physical Examination:  Visit Vitals    /57    Pulse 70    Resp 16    Ht 5' 2\" (1.575 m)    Wt 176 lb (79.8 kg)    SpO2 97%    BMI 32.19 kg/m2      Gen: elderly female in no acute distress  HEENT: mucous membranes moist  Thyroid: no enlargement or nodules noted  CAD: normal rate, regular rhythm  PULM: unlabored respirations  EXT: no clubbing, cyanosis or edema  Neuro: grossly non focal, reflexes are brisk but no tremor of outstretched hands  Psych: pleasant, fair insight into medical hx  Skin: warm, dry    Clinical Data Review:  Lab Results   Component Value Date/Time    Hemoglobin A1c 8.9 (H) 03/27/2018 10:25 AM     Lab Results   Component Value Date/Time    Sodium 144 04/26/2018 10:53 AM    Potassium 3.9 04/26/2018 10:53 AM    Chloride 104 04/26/2018 10:53 AM    CO2 25 04/26/2018 10:53 AM    Anion gap 7 04/04/2018 10:00 PM    Glucose 54 (L) 04/26/2018 10:53 AM    BUN 28 (H) 04/26/2018 10:53 AM    Creatinine 1.18 (H) 04/26/2018 10:53 AM    BUN/Creatinine ratio 24 04/26/2018 10:53 AM    GFR est AA 51 (L) 04/26/2018 10:53 AM    GFR est non-AA 45 (L) 04/26/2018 10:53 AM    Calcium 9.1 04/26/2018 10:53 AM     Lab Results   Component Value Date/Time    Microalb/Creat ratio (ug/mg creat.) 88.5 (H) 12/05/2017 09:36 AM     Lab Results   Component Value Date/Time    Cholesterol, total 100 03/27/2018 10:25 AM    HDL Cholesterol 52 03/27/2018 10:25 AM    LDL, calculated 33 03/27/2018 10:25 AM    VLDL, calculated 15 03/27/2018 10:25 AM    Triglyceride 74 03/27/2018 10:25 AM    CHOL/HDL Ratio 6.0 (H) 10/13/2016 12:13 PM     Lab Results   Component Value Date/Time    Hemoglobin A1c 8.9 (H) 03/27/2018 10:25 AM     Lab Results   Component Value Date/Time    TSH 0.051 (L) 04/26/2018 10:53 AM    TSH 0.090 (L) 03/27/2018 10:25 AM    T4, Free 1.36 03/27/2018 10:25 AM    T4, Total 10.1 04/23/2011 04:07 AM      Assessment and Plan:  Ana Clay is a 66 y.o. female here for type 2 diabetes, previously suboptimally controlled on multiple daily insulin injection regimen. A1c improved after switching mealtime insulin to GLP1a (9.6 --> 8.0%) however she has had hyperglycemia intermittently in the past few months presumably from high carbohydrate food choices. At her last visit we discussed possibly resuming mealtime insulin, but this was logistically difficult with her living situation. Now that she is cared [facility] at an [facility], will resume mealtime Humalog as below. Reduce Tresiba given fasting hypoglycemia. 1. Type 2 diabetes mellitus with stage 3 chronic kidney disease, with long-term current use of insulin ([facility])     Glycemic Medication Changes:  - reduce Tresiba to 40 units daily  - start taking Humalog 8 units with each meal    - hold if pre-meal blood sugar is below 100  - continue Victoza 1.8mg daily  - continue metformin XR 500mg twice a day    DM Health Maintenance: pertinent items updated in HM tab  A1c: update today  Cv/Lipids: on atorvastatin, lipids UTD  BP/Renal: BP at goal, on ACEi, microalbumin UTD and elevated  Podiatry: no active issues, encouraged well-fitting footwear and daily inspection  Neuro: + neuropathy, foot exam UTD  Ophtho: yearly eye exam recommended  Diet and exercise: discussed healthy eating and exercise recommendations, particularly reduction in dietary carbohydrates      2. Adrenal insufficiency ([facility]): primary s/p BL adrenalectomy. Continue HC 15mg Qam, 5mg Qpm. Recent sodium/potassium levels were normal - continue fludrocortisone. 3. Acquired hypothyroidism: euthyroid on exam after recent LT4 dose adjustment (decrease from 100 to 88). Check T3/T4 to confirm this today. I am following these levels rather than TSH due to  central hypothyroidism. I spent 25 minutes with the patient today and > 50% of the time was spent counseling the patient about thyroid and adrenal medication management, also diabetes management including medication options and dietary modification.  Patient verbalized an understanding and will return to clinic in 3 months. Thank you for the opportunity to participate in this patient's care.     Janey Jauregui MD  [facility]  [facility]

## 2018-07-26 NOTE — PATIENT INSTRUCTIONS
Diabetes Instructions:  - continue Victoza 1.8mg daily  - reduce Tresiba to 40 units daily  - start taking Humalog 8 units with each meal    - hold if pre-meal blood sugar is below 100  - continue metformin XR 500mg twice a day    Check blood sugar at least FOUR times a day: before each meal and at bedtime    Feel free to call my office at [phone] if you have any questions. If her formulary does not cover Humalog, we will use Novolog instead (equivalent dosing).        New office:    [facility] [facility] will be moving to a new location effective Monday September 10, 2018    The new office will be on the . [address] [facility] located in the [address] [place] [address], [address] [place] [address], [address] [place] [address]    The phone number will stay the same

## 2018-07-26 NOTE — MR AVS SNAPSHOT
[address] [facility] [address] [place] 
[phone] Patient: Alexsander Paul MRN: [id number] CI:5/16/1987 Visit Information Date & Time Provider Department Dept. Phone Encounter #  
 7/26/2018  9:50 AM MD [facility] [phone] Your Appointments 9/20/2018 10:30 AM  
ROUTINE CARE with Brad Douglas MD  
[place] [address] [facility] [address] [place] [address]) Appt Note: 4m Medication re-evaluation. [address], [address] [place]  
[address], Geovanny Darnell De [address] [phone] Upcoming Health Maintenance Date Due ZOSTER VACCINE AGE 60> 4/29/2000 Influenza Age 5 to Adult 8/1/2018 HEMOGLOBIN A1C Q6M 9/27/2018 MICROALBUMIN Q1 12/5/2018 FOOT EXAM Q1 3/27/2019 LIPID PANEL Q1 3/27/2019 MEDICARE YEARLY EXAM 6/1/2019 EYE EXAM RETINAL OR DILATED Q1 6/14/2019 DTaP/Tdap/Td series (2 - Tdap) 3/30/2020 GLAUCOMA SCREENING Q2Y 6/14/2020 Allergies as of 7/26/2018  Review Complete On: 7/6/2018 By: Analy Todd NP Severity Noted Reaction Type Reactions Amoxicillin  10/13/2016    Unknown (comments) Erythromycin  04/22/2011    Rash, Unknown (comments) fever Current Immunizations  Reviewed on 10/18/2017 Name Date DTaP 3/30/2010 Influenza High Dose Vaccine PF 10/18/2017 Influenza Vaccine 10/30/2015 Influenza Vaccine Whole 10/15/2010 Pneumococcal Conjugate (PCV-13) 11/14/2017 ZZZ-RETIRED (DO NOT USE) Pneumococcal Vaccine (Unspecified Type) 10/15/2009 Not reviewed this visit You Were Diagnosed With   
  
 Codes Comments Type 2 diabetes mellitus with stage 3 chronic kidney disease, with long-term current use of insulin (HCC)    -  Primary ICD-10-CM: E11.22, N18.3, Z79.4 ICD-9-CM: 250.40, 585.3, V58.67   
 Hypothyroidism due to acquired atrophy of thyroid     ICD-10-CM: E03.4 ICD-9-CM: 244.8, 246.8 Vitals BP Pulse Resp Height(growth percentile) Weight(growth percentile) SpO2  
 104/57 70 16 5' 2\" (1.575 m) 176 lb (79.8 kg) 97% BMI OB Status Smoking Status 32.19 kg/m2 Postmenopausal Never Smoker Vitals History BMI and BSA Data Body Mass Index Body Surface Area  
 32.19 kg/m 2 1.87 m 2 [facility] [phone] Your Updated Medication List  
  
   
This list is accurate as of 7/26/18 10:41 AM.  Always use your most recent med list. amLODIPine 5 mg tablet Commonly known as:  Lyubov Trudy Take 1 Tab by mouth daily. aspirin 81 mg chewable tablet Take 1 Tab by mouth daily. atorvastatin 80 mg tablet Commonly known as:  LIPITOR Take 1 Tab by mouth daily. BD ULTRA-FINE MINI PEN NEEDLE 31 gauge x 3/16\" Ndle Generic drug:  Insulin Needles (Disposable) USE TO INJECT TRESIBA AND VICTOZA AS DIRECTED CEFTIN 250 mg tablet Generic drug:  cefUROXime Take 250 mg by mouth two (2) times a day. Indications: as needed for UTI  
  
 clotrimazole-betamethasone topical cream  
Commonly known as:  Meseret Ojeda Apply  to affected area two (2) times a day. cranberry 500 mg capsule Take 1 Cap by mouth daily. CULTURELLE PROBIOTICS 10 billion cell -200 mg Cpsp Generic drug:  Lactobac. rhamnosus GG-inulin Take 1 Cap by mouth two (2) times a day. escitalopram oxalate 10 mg tablet Commonly known as:  Alexi Legacy Take 1 Tab by mouth daily. Indications: major depressive disorder ESTRACE 0.01 % (0.1 mg/gram) vaginal cream  
Generic drug:  estradiol  
  
 fludrocortisone 0.1 mg tablet Commonly known as:  FLORINEF Take 1 Tab by mouth daily. furosemide 40 mg tablet Commonly known as:  LASIX Take 1 Tab by mouth daily. * glucose blood VI test strips strip Commonly known as:  PRODIGY NO CODING Patient test glucose 3 times daily. ICD E11.9  
  
 * PRODIGY NO CODING strip Generic drug:  glucose blood VI test strips USE TO TEST BLOOD SUGAR 3 TIMES A DAY  
  
 hydrocortisone 5 mg tablet Commonly known as:  CORTEF  
TAKE 3 TABLETS IN THE MORNING AND TAKE 1 TABLET IN THE EVENING  
  
 IMODIUM PO Take  by mouth. insulin degludec 100 unit/mL (3 mL) Inpn Commonly known as:  TRESIBA FLEXTOUCH U-100  
40 Units by SubCUTAneous route daily. insulin lispro 100 unit/mL kwikpen Commonly known as:  HumaLOG KwikPen Insulin Inject 8 units TIDcc  
  
 levothyroxine 88 mcg tablet Commonly known as:  SYNTHROID Take 1 Tab by mouth Daily (before breakfast). lisinopril 40 mg tablet Commonly known as:  Arnold Files Take 1 Tab by mouth daily. metFORMIN  mg tablet Commonly known as:  GLUCOPHAGE XR Take 1 Tab by mouth two (2) times daily (with meals). naproxen 500 mg tablet Commonly known as:  NAPROSYN Take 500 mg by mouth two (2) times daily (with meals). OTHER Indications: accu-chek three times a day  
  
 trospium 20 mg tablet Commonly known as:  Melendez [place] TAKE 1 TABLET BY MOUTH TWICE A DAY. VICTOZA 3-EDGARDO 0.6 mg/0.1 mL (18 mg/3 mL) Pnij Generic drug:  Liraglutide INJECT 1.8ML SUBCUTANEOUSLY DAILY * Notice: This list has 2 medication(s) that are the same as other medications prescribed for you. Read the directions carefully, and ask your doctor or other care provider to review them with you. Prescriptions Printed Refills  
 insulin lispro (HUMALOG KWIKPEN INSULIN) 100 unit/mL kwikpen 3 Sig: Inject 8 units TIDcc Class: Print  
 insulin degludec (TRESIBA FLEXTOUCH U-100) 100 unit/mL (3 mL) inpn 3 Si Units by SubCUTAneous route daily. Class: Print Route: SubCUTAneous We Performed the Following HEMOGLOBIN A1C WITH EAG [81659 CPT(R)] T3, FREE [id number] CPT(R)] T4, FREE [id number] CPT(R)] Patient Instructions Diabetes Instructions: 
- continue Victoza 1.8mg daily 
- reduce Tresiba to 40 units daily 
- start taking Humalog 8 units with each meal  
 - hold if pre-meal blood sugar is below 100 
- continue metformin XR 500mg twice a day Check blood sugar at least FOUR times a day: before each meal and at bedtime Feel free to call my office at [phone] if you have any questions. If her formulary does not cover Humalog, we will use Novolog instead (equivalent dosing). New office: 
 
[facility] office will be moving to a new location effective Monday September 10, 2018 The new office will be on the . [address] located in the [address] [place] [address], [address] [place] [address], [address] [place] [address] The phone number will stay the same Introducing [facility]! Dear Vivian Godinez: Thank you for requesting a CENTERSONIC account. Our records indicate that you already have an active CENTERSONIC account. You can access your account anytime at [email or web address]. [employer]/Ion Torrent Did you know that you can access your hospital and ER discharge instructions at any time in CENTERSONIC? You can also review all of your test results from your hospital stay or ER visit. Additional Information If you have questions, please visit the Frequently Asked Questions section of the CENTERSONIC website at [email or web address] [employer]/[place]on [employer]/. Remember, CENTERSONIC is NOT to be used for urgent needs. For medical emergencies, dial 911. Now available from your iPhone and Android! Please provide this summary of care documentation to your next provider. Your primary care clinician is listed as Ferrell Soulier. If you have any questions after today's visit, please call [phone].

## 2018-07-27 LAB
EST. AVERAGE GLUCOSE BLD GHB EST-MCNC: 206 MG/DL
HBA1C MFR BLD: 8.8 % (ref 4.8–5.6)
T3FREE SERPL-MCNC: 1.6 PG/ML (ref 2–4.4)
T4 FREE SERPL-MCNC: 1.07 NG/DL (ref 0.82–1.77)

## 2018-07-30 RX ORDER — ESCITALOPRAM OXALATE 10 MG/1
10 TABLET ORAL DAILY
Qty: 30 TAB | Refills: 3 | Status: SHIPPED | OUTPATIENT
Start: 2018-07-30 | End: 2019-05-23 | Stop reason: SDUPTHER

## 2018-07-30 NOTE — TELEPHONE ENCOUNTER
From: Jaun Pittman  To: Jefferson Yañez MD  Sent: 7/26/2018 10:50 AM EDT  Subject: Medication Renewal Request    Original authorizing provider: MD Jaun Melara would like a refill of the following medications:  escitalopram oxalate (LEXAPRO) 10 mg tablet Jefferson Yañez MD]    Preferred pharmacy: [address] [place] [address] 18, [facility]    Comment:

## 2018-07-31 RX ORDER — LEVOTHYROXINE SODIUM 100 UG/1
100 TABLET ORAL
Qty: 90 TAB | Refills: 1 | Status: SHIPPED | OUTPATIENT
Start: 2018-07-31 | End: 2018-08-01 | Stop reason: SDUPTHER

## 2018-08-01 RX ORDER — LEVOTHYROXINE SODIUM 100 UG/1
100 TABLET ORAL
Qty: 90 TAB | Refills: 1 | Status: SHIPPED | OUTPATIENT
Start: 2018-08-01 | End: 2019-01-25 | Stop reason: SDUPTHER

## 2018-08-03 RX ORDER — LISINOPRIL 40 MG/1
TABLET ORAL
Qty: 30 TAB | Refills: 3 | Status: SHIPPED | OUTPATIENT
Start: 2018-08-03 | End: 2018-09-27

## 2018-09-05 RX ORDER — AMLODIPINE BESYLATE 5 MG/1
TABLET ORAL
Qty: 30 TAB | Refills: 0 | Status: SHIPPED | OUTPATIENT
Start: 2018-09-05 | End: 2018-09-27

## 2018-09-20 ENCOUNTER — OFFICE VISIT (OUTPATIENT)
Dept: INTERNAL MEDICINE CLINIC | Age: 78
End: 2018-09-20

## 2018-09-20 VITALS
TEMPERATURE: 97.5 F | HEIGHT: 62 IN | OXYGEN SATURATION: 97 % | WEIGHT: 168.1 LBS | RESPIRATION RATE: 16 BRPM | HEART RATE: 71 BPM | DIASTOLIC BLOOD PRESSURE: 60 MMHG | SYSTOLIC BLOOD PRESSURE: 100 MMHG | BODY MASS INDEX: 30.93 KG/M2

## 2018-09-20 DIAGNOSIS — Z79.4 TYPE 2 DIABETES MELLITUS WITH STAGE 3 CHRONIC KIDNEY DISEASE, WITH LONG-TERM CURRENT USE OF INSULIN (HCC): ICD-10-CM

## 2018-09-20 DIAGNOSIS — E24.0 CUSHING DISEASE (HCC): Chronic | ICD-10-CM

## 2018-09-20 DIAGNOSIS — N18.30 TYPE 2 DIABETES MELLITUS WITH STAGE 3 CHRONIC KIDNEY DISEASE, WITH LONG-TERM CURRENT USE OF INSULIN (HCC): ICD-10-CM

## 2018-09-20 DIAGNOSIS — E27.40 ADRENAL INSUFFICIENCY (HCC): Chronic | ICD-10-CM

## 2018-09-20 DIAGNOSIS — F32.A DEPRESSION, UNSPECIFIED DEPRESSION TYPE: Chronic | ICD-10-CM

## 2018-09-20 DIAGNOSIS — E11.22 TYPE 2 DIABETES MELLITUS WITH STAGE 3 CHRONIC KIDNEY DISEASE, WITH LONG-TERM CURRENT USE OF INSULIN (HCC): ICD-10-CM

## 2018-09-20 DIAGNOSIS — I10 ESSENTIAL HYPERTENSION, BENIGN: Primary | Chronic | ICD-10-CM

## 2018-09-20 NOTE — PROGRESS NOTES
Subjective: Chief Complaint Patient presents with  Medication Evaluation She  is a 66y.o. year old female who presents for evaluation. Here for follow up of [place] / DM / HTN Per Endocrinologist, her current diabetic regimen is: 
Glycemic Medication Changes: 
- reduce Tresiba to 40 units daily 
- start taking Humalog 8 units with each meal  
                      - hold if pre-meal blood sugar is below 100 
- continue Victoza 1.8mg daily 
- continue metformin XR 500mg twice a day Having BS checked but patient doesn't know what they are. Has some lightheadedness in am. 
 
 
 
Historical Data Past Medical History:  
Diagnosis Date  Arthritis osteoporosis  Cataract  Diabetes ([address]  Endocrine disease 1972  
 adrenal insufficiency - bilat adrenalectomy  GERD (gastroesophageal reflux disease)  H/O Cushing disease  Hypertension  Other ill-defined conditions(799.89)   
 elizabeth's disease  Stroke [facility]) 2016 TIA 2011 & L occipial stroke 4/2016  Thyroid disease hypothyroid  UTI (lower urinary tract infection) Past Surgical History:  
Procedure Laterality Date  ENDOCRINE SURGERY PROC UNLISTED Bilateral 1972  
 bilat adrenalectomy in 1972  HX APPENDECTOMY  HX CATARACT REMOVAL Left ON the left  HX CHOLECYSTECTOMY [place]  HX HYSTERECTOMY  HX TONSILLECTOMY Outpatient Encounter Prescriptions as of 9/20/2018 Medication Sig Dispense Refill  amLODIPine (NORVASC) 5 mg tablet TAKE ONE TABLET BY MOUTH EVERY DAY 30 Tab 0  
 lisinopril (PRINIVIL, ZESTRIL) 40 mg tablet TAKE 1 TABLET BY MOUTH EVERY DAY 30 Tab 3  
 levothyroxine (SYNTHROID) 100 mcg tablet Take 1 Tab by mouth Daily (before breakfast). 90 Tab 1  
 escitalopram oxalate (LEXAPRO) 10 mg tablet Take 1 Tab by mouth daily.  Indications: major depressive disorder 30 Tab 3  
 insulin lispro (HUMALOG KWIKPEN INSULIN) 100 unit/mL kwikpen Inject 8 units TIDcc 5 Pen 3  
 insulin degludec (TRESIBA FLEXTOUCH U-100) 100 unit/mL (3 mL) inpn 40 Units by SubCUTAneous route daily. 15 mL 3  
 loperamide HCl (IMODIUM PO) Take  by mouth.  clotrimazole-betamethasone (LOTRISONE) topical cream Apply  to affected area two (2) times a day.  naproxen (NAPROSYN) 500 mg tablet Take 500 mg by mouth two (2) times daily (with meals).  cefUROXime (CEFTIN) 250 mg tablet Take 250 mg by mouth two (2) times a day. Indications: as needed for UTI  OTHER Indications: accu-chek three times a day  VICTOZA 3-EDGARDO 0.6 mg/0.1 mL (18 mg/3 mL) pnij INJECT 1.8ML SUBCUTANEOUSLY DAILY 9 mL 3  
 BD ULTRA-FINE MINI PEN NEEDLE 31 gauge x 3/16\" ndle USE TO INJECT TRESIBA AND VICTOZA AS DIRECTED 30 Pen Needle 3  
 PRODIGY NO CODING strip USE TO TEST BLOOD SUGAR 3 TIMES A  Strip 5  furosemide (LASIX) 40 mg tablet Take 1 Tab by mouth daily. 90 Tab 3  
 metFORMIN ER (GLUCOPHAGE XR) 500 mg tablet Take 1 Tab by mouth two (2) times daily (with meals). 180 Tab 3  
 hydrocortisone (CORTEF) 5 mg tablet TAKE 3 TABLETS IN THE MORNING AND TAKE 1 TABLET IN THE EVENING 120 Tab 10  
 fludrocortisone (FLORINEF) 0.1 mg tablet Take 1 Tab by mouth daily. 90 Tab 3  
 aspirin 81 mg chewable tablet Take 1 Tab by mouth daily. 100 Tab 3  
 atorvastatin (LIPITOR) 80 mg tablet Take 1 Tab by mouth daily. 90 Tab 3  
 cranberry 500 mg capsule Take 1 Cap by mouth daily. 100 Cap 3  
 CULTURELLE PROBIOTICS 10 billion cell -200 mg cpSP Take 1 Cap by mouth two (2) times a day. 100 Cap 5  
 trospium (SANCTURA) 20 mg tablet TAKE 1 TABLET BY MOUTH TWICE A DAY. 3  
 ESTRACE 0.01 % (0.1 mg/gram) vaginal cream   3  
 glucose blood VI test strips (PRODIGY NO CODING) strip Patient test glucose 3 times daily. ICD E11.9 150 Strip 1 No facility-administered encounter medications on file as of 9/20/2018. Allergies Allergen Reactions  Amoxicillin Unknown (comments)  Erythromycin Rash and Unknown (comments) fever Social History Social History  Marital status:  Spouse name: N/A  
 Number of children: N/A  
 Years of education: N/A Occupational History  Not on file. Social History Main Topics  Smoking status: Never Smoker  Smokeless tobacco: Never Used  Alcohol use No  
 Drug use: No  
 Sexual activity: Not Currently Other Topics Concern  Not on file Social History Narrative Review of Systems A comprehensive review of systems was negative except for that written in the HPI. Objective:  
 
Vitals:  
 09/20/18 1039 BP: 100/60 Pulse: 71 Resp: 16 Temp: 97.5 °F (36.4 °C) TempSrc: Oral  
SpO2: 97% Weight: 168 lb 1.6 oz (76.2 kg) Height: 5' 2\" (1.575 m) Pleasant WF in no acute distress. Neck: Supple. No masses. Cardiac:  RRR without murmurs, gallops or rubs. Lungs: Clear to auscultation. Abdomen: Soft and non-tender. No masses. Extremities: No edema Neuro: Intact ASSESSMENT / PLAN:  
1. Essential hypertension, benign · Low sodium diet. · Continue amlodipine / lisinopril 2. Type 2 diabetes mellitus with stage 3 chronic kidney disease, with long-term current use of insulin ([address] · Stable and controlled on current medical regimen. · Continue metformin, Tresiba, Humalog · On ACEI and statin. 3. Depression, unspecified depression type · Continue Lexapro. 4. Cushing disease ([address] · Pituitary / S/P XRT and bilateral adernalectomy 5. Adrenal insufficiency ([address] · Continue Florinef and Cortef Patient Instructions Follow a low glycemic index diet. Stay active. Continue your current medications. Reduce lisinopril from 40 mg daily to 20 mg daily. Bring copy of blood sugar report on next visit. Follow-up Disposition: 
Return in about 4 months (around 1/20/2019) for Regular follow up, F/U HTN.   
Advised her to call back or return to office if symptoms worsen/change/persist. 
Discussed expected course/resolution/complications of diagnosis in detail with patient. Medication risks/benefits/costs/interactions/alternatives discussed with patient. She was given an after visit summary which includes diagnoses, current medications, & vitals. She expressed understanding with the diagnosis and plan.

## 2018-09-20 NOTE — PATIENT INSTRUCTIONS
Follow a low glycemic index diet. Stay active. Continue your current medications. Reduce lisinopril from 40 mg daily to 20 mg daily. Bring copy of blood sugar report on next visit.

## 2018-09-20 NOTE — PROGRESS NOTES
Chief Complaint Patient presents with  Medication Evaluation Reviewed record in preparation for visit and have obtained necessary documentation. Identified pt with two pt identifiers(name and ). Health Maintenance Due Topic  ZOSTER VACCINE AGE 60>   
 Influenza Age 5 to Adult Chief Complaint Patient presents with  Medication Evaluation Wt Readings from Last 3 Encounters:  
18 168 lb 1.6 oz (76.2 kg) 18 176 lb (79.8 kg) 18 172 lb 3.2 oz (78.1 kg) Temp Readings from Last 3 Encounters:  
18 97.5 °F (36.4 °C) (Oral) 18 97 °F (36.1 °C) (Oral) 18 98.1 °F (36.7 °C) (Oral) BP Readings from Last 3 Encounters:  
18 100/60  
18 104/57  
18 130/80 Pulse Readings from Last 3 Encounters:  
18 71  
18 70  
18 76 Learning Assessment: 
:  
 
Learning Assessment 2017 PRIMARY LEARNER Patient HIGHEST LEVEL OF EDUCATION - PRIMARY LEARNER  > 4 YEARS OF COLLEGE  
BARRIERS PRIMARY LEARNER NONE PRIMARY LANGUAGE ENGLISH  
LEARNER PREFERENCE PRIMARY LISTENING  
ANSWERED BY patient RELATIONSHIP SELF Depression Screening: 
:  
 
PHQ over the last two weeks 2018 Little interest or pleasure in doing things Not at all Feeling down, depressed, irritable, or hopeless Not at all Total Score PHQ 2 0 Fall Risk Assessment: 
:  
 
Fall Risk Assessment, last 12 mths 2018 Able to walk? Yes Fall in past 12 months? No  
 
 
Abuse Screening: 
:  
 
Abuse Screening Questionnaire 2017 Do you ever feel afraid of your partner? Ophelia Distel Are you in a relationship with someone who physically or mentally threatens you? Ophelia Distel Is it safe for you to go home? Caleb Cardoso Coordination of Care Questionnaire: 
:  
 
1) Have you been to an emergency room, urgent care clinic since your last visit? no  
Hospitalized since your last visit? no          
 
 2) Have you seen or consulted any other health care providers outside of [address] [place] [address] since your last visit? no  (Include any pap smears or colon screenings in this section.) 3) Do you have an Advance Directive on file? yes 4) Are you interested in receiving information on Advance Directives? NO Patient is accompanied by health aid I have received verbal consent from Veronica James to discuss any/all medical information while they are present in the room. Reviewed record  In preparation for visit and have obtained necessary documentation.

## 2018-09-20 NOTE — MR AVS SNAPSHOT
[address] [facility], [address] [place] [address] 
[phone] Patient: Alexander Jones MRN: [id number] KIB:[birth year] Visit Information Date & Time Provider Department Dept. Phone Encounter #  
 9/20/2018 10:30 AM MD Jaya Martinva  [facility] [phone] [id number] Follow-up Instructions Return in about 4 months (around 1/20/2019) for Regular follow up, F/U HTN. Your Appointments 10/25/2018 11:30 AM  
Follow Up with MD Vladislav [facility]) Appt Note: f/u diabetes cp0.00  
 [address] [address]  
[phone]  
  
   
 [address] [facility] [address] Upcoming Health Maintenance Date Due Influenza Age 5 to Adult 8/1/2018 ZOSTER VACCINE AGE 60> 11/30/2018* MICROALBUMIN Q1 12/5/2018 HEMOGLOBIN A1C Q6M 1/26/2019 FOOT EXAM Q1 3/27/2019 LIPID PANEL Q1 3/27/2019 MEDICARE YEARLY EXAM 6/1/2019 EYE EXAM RETINAL OR DILATED Q1 6/14/2019 DTaP/Tdap/Td series (2 - Tdap) 3/30/2020 GLAUCOMA SCREENING Q2Y 6/14/2020 *Topic was postponed. The date shown is not the original due date. Allergies as of 9/20/2018  Review Complete On: 7/26/2018 By: Chintan Power MD  
  
 Severity Noted Reaction Type Reactions Amoxicillin  10/13/2016    Unknown (comments) Erythromycin  04/22/2011    Rash, Unknown (comments) fever Current Immunizations  Reviewed on 10/18/2017 Name Date DTaP 3/30/2010 Influenza High Dose Vaccine PF 10/18/2017 Influenza Vaccine 10/30/2015 Influenza Vaccine Whole 10/15/2010 Pneumococcal Conjugate (PCV-13) 11/14/2017 ZZZ-RETIRED (DO NOT USE) Pneumococcal Vaccine (Unspecified Type) 10/15/2009 Not reviewed this visit You Were Diagnosed With   
  
 Codes Comments  Essential hypertension, benign    -  Primary ICD-10-CM: I10 
 ICD-9-CM: 401.1 Type 2 diabetes mellitus with stage 3 chronic kidney disease, with long-term current use of insulin (HCC)     ICD-10-CM: E11.22, N18.3, Z79.4 ICD-9-CM: 250.40, 585.3, V58.67 Depression, unspecified depression type     ICD-10-CM: F32.9 ICD-9-CM: 031 Vitals BP Pulse Temp Resp Height(growth percentile) Weight(growth percentile) 100/60 (BP 1 Location: Left arm, BP Patient Position: Sitting) 71 97.5 °F (36.4 °C) (Oral) 16 5' 2\" (1.575 m) 168 lb 1.6 oz (76.2 kg) SpO2 BMI OB Status Smoking Status 97% 30.75 kg/m2 Postmenopausal Never Smoker Vitals History BMI and BSA Data Body Mass Index Body Surface Area 30.75 kg/m 2 1.83 m 2 Preferred Pharmacy Pharmacy Name Phone [address] [place] [address] [phone] Your Updated Medication List  
  
   
This list is accurate as of 9/20/18 10:57 AM.  Always use your most recent med list. amLODIPine 5 mg tablet Commonly known as:  Lennis Eagles TAKE ONE TABLET BY MOUTH EVERY DAY  
  
 aspirin 81 mg chewable tablet Take 1 Tab by mouth daily. atorvastatin 80 mg tablet Commonly known as:  LIPITOR Take 1 Tab by mouth daily. BD ULTRA-FINE MINI PEN NEEDLE 31 gauge x 3/16\" Ndle Generic drug:  Insulin Needles (Disposable) USE TO INJECT TRESIBA AND VICTOZA AS DIRECTED CEFTIN 250 mg tablet Generic drug:  cefUROXime Take 250 mg by mouth two (2) times a day. Indications: as needed for UTI  
  
 clotrimazole-betamethasone topical cream  
Commonly known as:  Kathyrn Nutley Apply  to affected area two (2) times a day. cranberry 500 mg capsule Take 1 Cap by mouth daily. CULTURELLE PROBIOTICS 10 billion cell -200 mg Cpsp Generic drug:  Lactobac. rhamnosus GG-inulin Take 1 Cap by mouth two (2) times a day. escitalopram oxalate 10 mg tablet Commonly known as:  Cloria Kohut Take 1 Tab by mouth daily. Indications: major depressive disorder ESTRACE 0.01 % (0.1 mg/gram) vaginal cream  
Generic drug:  estradiol  
  
 fludrocortisone 0.1 mg tablet Commonly known as:  FLORINEF Take 1 Tab by mouth daily. furosemide 40 mg tablet Commonly known as:  LASIX Take 1 Tab by mouth daily. * glucose blood VI test strips strip Commonly known as:  PRODIGY NO CODING Patient test glucose 3 times daily. ICD E11.9  
  
 * PRODIGY NO CODING strip Generic drug:  glucose blood VI test strips USE TO TEST BLOOD SUGAR 3 TIMES A DAY  
  
 hydrocortisone 5 mg tablet Commonly known as:  CORTEF  
TAKE 3 TABLETS IN THE MORNING AND TAKE 1 TABLET IN THE EVENING  
  
 IMODIUM PO Take  by mouth. insulin degludec 100 unit/mL (3 mL) Inpn Commonly known as:  TRESIBA FLEXTOUCH U-100  
40 Units by SubCUTAneous route daily. insulin lispro 100 unit/mL kwikpen Commonly known as:  HumaLOG KwikPen Insulin Inject 8 units TIDcc  
  
 levothyroxine 100 mcg tablet Commonly known as:  SYNTHROID Take 1 Tab by mouth Daily (before breakfast). lisinopril 40 mg tablet Commonly known as:  PRINIVIL, ZESTRIL  
TAKE 1 TABLET BY MOUTH EVERY DAY  
  
 metFORMIN  mg tablet Commonly known as:  GLUCOPHAGE XR Take 1 Tab by mouth two (2) times daily (with meals). naproxen 500 mg tablet Commonly known as:  NAPROSYN Take 500 mg by mouth two (2) times daily (with meals). OTHER Indications: accu-chek three times a day  
  
 trospium 20 mg tablet Commonly known as:  Melendez [place] TAKE 1 TABLET BY MOUTH TWICE A DAY. VICTOZA 3-EDGARDO 0.6 mg/0.1 mL (18 mg/3 mL) Pnij Generic drug:  Liraglutide INJECT 1.8ML SUBCUTANEOUSLY DAILY * Notice: This list has 2 medication(s) that are the same as other medications prescribed for you. Read the directions carefully, and ask your doctor or other care provider to review them with you. Follow-up Instructions Return in about 4 months (around 1/20/2019) for Regular follow up, F/U HTN. Patient Instructions Follow a low glycemic index diet. Stay active. Continue your current medications. Reduce lisinopril from 40 mg daily to 20 mg daily. Bring copy of blood sugar report on next visit. Introducing [facility] SERVICES! Dear Jackie Alexander: Thank you for requesting a Rezora account. Our records indicate that you already have an active Rezora account. You can access your account anytime at [email or web address] Did you know that you can access your hospital and ER discharge instructions at any time in Rezora? You can also review all of your test results from your hospital stay or ER visit. Additional Information If you have questions, please visit the Frequently Asked Questions section of the Rezora website at [email or web address] Remember, Rezora is NOT to be used for urgent needs. For medical emergencies, dial 911. Now available from your iPhone and Android! Please provide this summary of care documentation to your next provider. Your primary care clinician is listed as Stephanie Cotton. If you have any questions after today's visit, please call [phone].

## 2018-09-27 RX ORDER — LISINOPRIL 20 MG/1
TABLET ORAL
Qty: 16 TAB | Refills: 4 | Status: SHIPPED | OUTPATIENT
Start: 2018-09-27 | End: 2019-05-23 | Stop reason: SDUPTHER

## 2018-09-27 RX ORDER — AMLODIPINE BESYLATE 5 MG/1
TABLET ORAL
Qty: 30 TAB | Refills: 4 | Status: SHIPPED | OUTPATIENT
Start: 2018-09-27 | End: 2019-05-23 | Stop reason: SDUPTHER

## 2018-10-26 ENCOUNTER — APPOINTMENT (OUTPATIENT)
Dept: CT IMAGING | Age: 78
End: 2018-10-26
Attending: EMERGENCY MEDICINE
Payer: MEDICARE

## 2018-10-26 ENCOUNTER — HOSPITAL ENCOUNTER (EMERGENCY)
Age: 78
Discharge: HOME | End: 2018-10-26
Attending: EMERGENCY MEDICINE
Payer: MEDICARE

## 2018-10-26 VITALS
DIASTOLIC BLOOD PRESSURE: 86 MMHG | RESPIRATION RATE: 16 BRPM | HEART RATE: 64 BPM | SYSTOLIC BLOOD PRESSURE: 157 MMHG | OXYGEN SATURATION: 97 % | TEMPERATURE: 98.7 F
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
## 2018-10-26 DIAGNOSIS — M54.2 NECK PAIN: ICD-10-CM

## 2018-10-26 DIAGNOSIS — S22.089A CLOSED FRACTURE OF TWELFTH THORACIC VERTEBRA, UNSPECIFIED FRACTURE MORPHOLOGY, INITIAL ENCOUNTER (HCC): Primary | ICD-10-CM

## 2018-10-26 DIAGNOSIS — S09.90XA MINOR HEAD INJURY, INITIAL ENCOUNTER: ICD-10-CM

## 2018-10-26 LAB
ALBUMIN SERPL-MCNC: 3.6 G/DL (ref 3.5–5)
ALBUMIN/GLOB SERPL: 1 {RATIO} (ref 1.1–2.2)
ALP SERPL-CCNC: 74 U/L (ref 45–117)
ALT SERPL-CCNC: 17 U/L (ref 12–78)
ANION GAP SERPL CALC-SCNC: 7 MMOL/L (ref 5–15)
AST SERPL-CCNC: 31 U/L (ref 15–37)
ATRIAL RATE: 66 BPM
BILIRUB SERPL-MCNC: 0.6 MG/DL (ref 0.2–1)
BUN SERPL-MCNC: 22 MG/DL (ref 6–20)
BUN/CREAT SERPL: 18 (ref 12–20)
CALCIUM SERPL-MCNC: 8.6 MG/DL (ref 8.5–10.1)
CALCULATED P AXIS, ECG09: 58 DEGREES
CALCULATED R AXIS, ECG10: -63 DEGREES
CALCULATED T AXIS, ECG11: 8 DEGREES
CHLORIDE SERPL-SCNC: 111 MMOL/L (ref 97–108)
CO2 SERPL-SCNC: 27 MMOL/L (ref 21–32)
COMMENT, HOLDF: NORMAL
CREAT SERPL-MCNC: 1.22 MG/DL (ref 0.55–1.02)
DIAGNOSIS, 93000: NORMAL
GLOBULIN SER CALC-MCNC: 3.6 G/DL (ref 2–4)
GLUCOSE SERPL-MCNC: 91 MG/DL (ref 65–100)
P-R INTERVAL, ECG05: 192 MS
POTASSIUM SERPL-SCNC: 3.2 MMOL/L (ref 3.5–5.1)
PROT SERPL-MCNC: 7.2 G/DL (ref 6.4–8.2)
Q-T INTERVAL, ECG07: 430 MS
QRS DURATION, ECG06: 118 MS
QTC CALCULATION (BEZET), ECG08: 450 MS
SAMPLES BEING HELD,HOLD: NORMAL
SODIUM SERPL-SCNC: 145 MMOL/L (ref 136–145)
VENTRICULAR RATE, ECG03: 66 BPM

## 2018-10-26 PROCEDURE — 72131 CT LUMBAR SPINE W/O DYE: CPT

## 2018-10-26 PROCEDURE — 97116 GAIT TRAINING THERAPY: CPT

## 2018-10-26 PROCEDURE — G8978 MOBILITY CURRENT STATUS: HCPCS

## 2018-10-26 PROCEDURE — 80053 COMPREHEN METABOLIC PANEL: CPT | Performed by: STUDENT IN AN ORGANIZED HEALTH CARE EDUCATION/TRAINING PROGRAM

## 2018-10-26 PROCEDURE — 93005 ELECTROCARDIOGRAM TRACING: CPT

## 2018-10-26 PROCEDURE — G8979 MOBILITY GOAL STATUS: HCPCS

## 2018-10-26 PROCEDURE — 72125 CT NECK SPINE W/O DYE: CPT

## 2018-10-26 PROCEDURE — G8980 MOBILITY D/C STATUS: HCPCS

## 2018-10-26 PROCEDURE — 70450 CT HEAD/BRAIN W/O DYE: CPT

## 2018-10-26 PROCEDURE — L0172 CERV COL SR FOAM 2PC PRE OTS: HCPCS

## 2018-10-26 PROCEDURE — 74011250637 HC RX REV CODE- 250/637: Performed by: EMERGENCY MEDICINE

## 2018-10-26 PROCEDURE — 97161 PT EVAL LOW COMPLEX 20 MIN: CPT

## 2018-10-26 PROCEDURE — 99285 EMERGENCY DEPT VISIT HI MDM: CPT

## 2018-10-26 PROCEDURE — 36415 COLL VENOUS BLD VENIPUNCTURE: CPT | Performed by: STUDENT IN AN ORGANIZED HEALTH CARE EDUCATION/TRAINING PROGRAM

## 2018-10-26 RX ORDER — ACETAMINOPHEN 325 MG/1
650 TABLET ORAL
Status: COMPLETED | OUTPATIENT
Start: 2018-10-26 | End: 2018-10-26

## 2018-10-26 RX ADMIN — ACETAMINOPHEN 650 MG: 325 TABLET, FILM COATED ORAL at 11:53

## 2018-10-26 NOTE — ED NOTES
1210: Patient incontinent of urine, brief changed and new pad placed. Patient complaining of pain along neck and back, repositioned for comfort. MD notified.
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
## 2018-10-26 NOTE — DISCHARGE INSTRUCTIONS
Neck Pain: Care Instructions  Your Care Instructions    You can have neck pain anywhere from the bottom of your head to the top of your shoulders. It can spread to the upper back or arms. Injuries, painting a ceiling, sleeping with your neck twisted, staying in one position for too long, and many other activities can cause neck pain. Most neck pain gets better with home care. Your doctor may recommend medicine to relieve pain or relax your muscles. He or she may suggest exercise and physical therapy to increase flexibility and relieve stress. You may need to wear a special (cervical) collar to support your neck for a day or two. Follow-up care is a key part of your treatment and safety. Be sure to make and go to all appointments, and call your doctor if you are having problems. It's also a good idea to know your test results and keep a list of the medicines you take. How can you care for yourself at home? · Try using a heating pad on a low or medium setting for 15 to 20 minutes every 2 or 3 hours. Try a warm shower in place of one session with the heating pad. · You can also try an ice pack for 10 to 15 minutes every 2 to 3 hours. Put a thin cloth between the ice and your skin. · Take pain medicines exactly as directed. ? If the doctor gave you a prescription medicine for pain, take it as prescribed. ? If you are not taking a prescription pain medicine, ask your doctor if you can take an over-the-counter medicine. · If your doctor recommends a cervical collar, wear it exactly as directed. When should you call for help? Call your doctor now or seek immediate medical care if:    · You have new or worsening numbness in your arms, buttocks or legs.     · You have new or worsening weakness in your arms or legs.  (This could make it hard to stand up.)     · You lose control of your bladder or bowels.    Watch closely for changes in your health, and be sure to contact your doctor if:    · Your neck pain is getting worse.     · You are not getting better after 1 week.     · You do not get better as expected. Where can you learn more? Go to http://love-beth.info/. Enter [id number] in the search box to learn more about \"Neck Pain: Care Instructions. \"  Current as of: November 29, 2017  Content Version: 11.8  © 8383-2899 [employer]. Care instructions adapted under license by [employer] (which disclaims liability or warranty for this information). If you have questions about a medical condition or this instruction, always ask your healthcare professional. [place] [address] any warranty or liability for your use of this information. Learning About a Closed Head Injury  What is a closed head injury? A closed head injury happens when your head gets hit hard. The strong force of the blow causes your brain to shake in your skull. This movement can cause the brain to bruise, swell, or tear. Sometimes nerves or blood vessels also get damaged. This can cause bleeding in or around the brain. A concussion is a type of closed head injury. What are the symptoms? If you have a mild concussion, you may have a mild headache or feel \"not quite right. \" These symptoms are common. They usually go away over a few days to 4 weeks. But sometimes after a concussion, you feel like you can't function as well as before the injury. And you have new symptoms. This is called postconcussive syndrome. You may:  · Find it harder to solve problems, think, concentrate, or remember. · Have headaches. · Have changes in your sleep patterns, such as not being able to sleep or sleeping all the time. · Have changes in your personality. · Not be interested in your usual activities. · Feel angry or anxious without a clear reason. · Lose your sense of taste or smell. · Be dizzy, lightheaded, or unsteady. It may be hard to stand or walk. How is a closed head injury treated?   Any person who may have a concussion needs to see a doctor. Some people have to stay in the hospital to be watched. Others can go home safely. If you go home, follow your doctor's instructions. He or she will tell you if you need someone to watch you closely for the next 24 hours or longer. Rest is the best treatment. Get plenty of sleep at night. And try to rest during the day. · Avoid activities that are physically or mentally demanding. These include housework, exercise, and schoolwork. And don't play video games, send text messages, or use the computer. You may need to change your school or work schedule to be able to avoid these activities. · Ask your doctor when it's okay to drive, ride a bike, or operate machinery. · Take an over-the-counter pain medicine, such as acetaminophen (Tylenol), ibuprofen (Advil, Motrin), or naproxen (Aleve). Be safe with medicines. Read and follow all instructions on the label. · Check with your doctor before you use any other medicines for pain. · Do not drink alcohol or use illegal drugs. They can slow recovery. They can also increase your risk of getting a second head injury. Follow-up care is a key part of your treatment and safety. Be sure to make and go to all appointments, and call your doctor if you are having problems. It's also a good idea to know your test results and keep a list of the medicines you take. Where can you learn more? Go to http://love-beth.info/. Enter E235 in the search box to learn more about \"Learning About a Closed Head Injury. \"  Current as of: June 4, 2018  Content Version: 11.8  © 9654-0489 [employer]. Care instructions adapted under license by "[employer]" (which disclaims liability or warranty for this information).  If you have questions about a medical condition or this instruction, always ask your healthcare professional. [place] [address] any warranty or liability for your use of this information. Compression Fracture of the Spine: Care Instructions  Your Care Instructions    A compression fracture happens when the front part of a spinal bone breaks and collapses. A fall or other accident can cause it. A minor injury or moving the wrong way can cause a break if you have thin or brittle bones (osteoporosis). These types of breaks will heal in 8 to 10 weeks. You will need rest and pain medicines. Your doctor may recommend physical therapy. Some doctors recommend that certain people with compression fractures wear braces. Your doctor also may treat thin or brittle bones. You may need surgery if you have lasting pain or if the bone presses on the spinal cord or nerves. You heal best when you take good care of yourself. Eat a variety of healthy foods, and don't smoke. Follow-up care is a key part of your treatment and safety. Be sure to make and go to all appointments, and call your doctor if you are having problems. It's also a good idea to know your test results and keep a list of the medicines you take. How can you care for yourself at home? · Be safe with medicines. Read and follow all instructions on the label. ? If the doctor gave you a prescription medicine for pain, take it as prescribed. ? If you are not taking a prescription pain medicine, ask your doctor if you can take an over-the-counter medicine. · Talk to your doctor about how to make your bones stronger. You may need medicines or a change in what you eat. · Be active only as directed by your doctor. When should you call for help? Call 911 anytime you think you may need emergency care. For example, call if:    · You are unable to move an arm or a leg at all.   [facility] your doctor now or seek immediate medical care if:    · You have new or worse symptoms in your arms, legs, belly, or buttocks. Symptoms may include:  ? Numbness or tingling. ? Weakness.   ? Pain.     · You lose bladder or bowel control.     · You have belly pain, bloating, vomiting, or nausea.    Watch closely for changes in your health, and be sure to contact your doctor if:    · You do not get better as expected. Where can you learn more? Go to http://love-beth.info/. Enter P445 in the search box to learn more about \"Compression Fracture of the Spine: Care Instructions. \"  Current as of: November 29, 2017  Content Version: 11.8  © 2323-7150 [employer]. Care instructions adapted under license by [employer] (which disclaims liability or warranty for this information). If you have questions about a medical condition or this instruction, always ask your healthcare professional. [address] any warranty or liability for your use of this information.

## 2018-10-26 NOTE — PROGRESS NOTES
Date of previous inpatient admission/ ED visit? ED visit 4/4/18-4/5/18 What brought the patient back to ED? Patient presents to the ED s/p fall at [facility]. Did patient decline recommended services during last admission/ ED visit (if yes, what)? No 
 
Has patient seen a provider since their last inpatient admission/ED visit (if yes, when)? Yes. PCP is Prabhjot Soares seen in office 9/20/18. CM Interventions: 
From previous inpatient admission/ED visit: No previous CM intervention noted From current inpatient admission/ED visit: Assessment SSED/CM consult received and appreciated. EMR reviewed. Updates received from [address]. Patient for d/c back to facility. Discussed w/ Lola Michel RN regarding transportation arrangements. CM placed call to daughter Dony Gutierres [phone] informed of d/c planning process can  patient at [phone]. CM met w/Ms. Travis Flood and informed of plan and daughter to drive. No additional transitional care needs verbalized. Medicare A/B and [employer] are insurance providers. Care Management Interventions PCP Verified by CM: Yes Last Visit to PCP: 09/20/18 Palliative Care Criteria Met (RRAT>21 & CHF Dx)?: No 
Transition of Care Consult (CM Consult): Discharge Planning MyChart Signup: Yes Discharge Durable Medical Equipment: No 
Health Maintenance Reviewed: Yes Physical Therapy Consult: Yes Occupational Therapy Consult: No 
Speech Therapy Consult: No 
Current Support Network: Assisted Living Confirm Follow Up Transport: Family Plan discussed with Pt/Family/Caregiver: Yes [employer] Information Provided?: No 
Discharge Location Discharge Placement: Assisted Living

## 2018-10-26 NOTE — ED NOTES
Cervical collar applied to patients neck, tolerated well. Patient does report neck pain post fall. Script called in.  Pt notified.
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
## 2018-10-27 ENCOUNTER — APPOINTMENT (OUTPATIENT)
Dept: MRI IMAGING | Age: 78
End: 2018-10-27
Attending: NURSE PRACTITIONER
Payer: MEDICARE

## 2018-10-27 ENCOUNTER — HOSPITAL ENCOUNTER (EMERGENCY)
Age: 78
Discharge: HOME | End: 2018-10-27
Attending: EMERGENCY MEDICINE
Payer: MEDICARE

## 2018-10-27 VITALS
SYSTOLIC BLOOD PRESSURE: 133 MMHG | OXYGEN SATURATION: 94 % | HEART RATE: 84 BPM | DIASTOLIC BLOOD PRESSURE: 59 MMHG | RESPIRATION RATE: 20 BRPM | BODY MASS INDEX: 29.78 KG/M2 | HEIGHT: 63 IN | TEMPERATURE: 98.8 F

## 2018-10-27 DIAGNOSIS — S22.010A TRAUMATIC COMPRESSION FRACTURE OF T1 THORACIC VERTEBRA, CLOSED, INITIAL ENCOUNTER (HCC): ICD-10-CM

## 2018-10-27 DIAGNOSIS — M54.50 ACUTE MIDLINE LOW BACK PAIN WITHOUT SCIATICA: ICD-10-CM

## 2018-10-27 DIAGNOSIS — S22.009D CLOSED FRACTURE OF THORACIC SPINE WITHOUT SPINAL CORD LESION WITH ROUTINE HEALING, SUBSEQUENT ENCOUNTER: Primary | ICD-10-CM

## 2018-10-27 DIAGNOSIS — S22.080A COMPRESSION FRACTURE OF T12 VERTEBRA (HCC): ICD-10-CM

## 2018-10-27 LAB — HEMOCCULT STL QL: NEGATIVE

## 2018-10-27 PROCEDURE — 72146 MRI CHEST SPINE W/O DYE: CPT

## 2018-10-27 PROCEDURE — 99284 EMERGENCY DEPT VISIT MOD MDM: CPT

## 2018-10-27 PROCEDURE — 51798 US URINE CAPACITY MEASURE: CPT

## 2018-10-27 PROCEDURE — 74011250637 HC RX REV CODE- 250/637: Performed by: NURSE PRACTITIONER

## 2018-10-27 PROCEDURE — 82272 OCCULT BLD FECES 1-3 TESTS: CPT | Performed by: NURSE PRACTITIONER

## 2018-10-27 RX ORDER — HYDROCODONE BITARTRATE AND ACETAMINOPHEN 5; 325 MG/1; MG/1
1 TABLET ORAL
Status: COMPLETED | OUTPATIENT
Start: 2018-10-27 | End: 2018-10-27

## 2018-10-27 RX ORDER — TRAMADOL HYDROCHLORIDE 50 MG/1
50 TABLET ORAL
Qty: 20 TAB | Refills: 0 | Status: SHIPPED | OUTPATIENT
Start: 2018-10-27 | End: 2019-03-11 | Stop reason: SDUPTHER

## 2018-10-27 RX ADMIN — HYDROCODONE BITARTRATE AND ACETAMINOPHEN 1 TABLET: 5; 325 TABLET ORAL at 20:07

## 2018-10-27 NOTE — ED NOTES
Bedside and Verbal shift change report given to Kim Rios RN (oncoming nurse) by Lakeisha Villalta RN (offgoing nurse). Report included the following information ED Summary.

## 2018-10-27 NOTE — ED NOTES
Bladder scan findings 217-277 ML urine in mid bladder, informed Debra Allan NP that pt could feel the pressure as I was scanning she did not feel that she needed to urinate

## 2018-10-27 NOTE — ED NOTES
April RN and I cleaned pt of diarrhea stool and removed her soiled pants, pants given to daughter in sealed bag pt tolerates well is slow to move positions due to her recent back pain. Informed Michelle Long that pt could not feel that she had a liquid stool while cleaning her. Pt states that normally feels when she has to have a BM. Repositioned pt provided warm blanket and dimmed lights for comfort. Daughter at the bedside

## 2018-10-27 NOTE — ED NOTES
AIDET communication provided to patient and daughter. They were informed of purposeful rounding to include collaboration of entire care team; patient acknowledged understanding. Pillow and blanket given for comfort.
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
## 2018-10-28 NOTE — ED NOTES
Care assumed at 9:30pm while awaiting MRI, MRI returned showing mild compression fractures of T1 and T12 without retropulsion or spinal stenosis. She was able to ambulate without difficulty in the ED. Feel that she is safe for discharge home with PCP and NS follow up. She was rx'd tramadol for pain. She will be discharged back to her nursing facility where she will be monitored closely. This plan was discussed with the patient's daughter at the bedside and she stated both understanding and agreement.

## 2018-10-28 NOTE — ED NOTES
Pt requested to urinate. Pt assisted on bed pan with 2 staff. Pt voided without difficulty 300 ml clear bobo urine.

## 2018-10-28 NOTE — ED NOTES
Discharge note: The patient was discharged home in stable condition, accompanied by daughter. The patient is alert and oriented, is in no respiratory distress and has vital signs noted. The patient's diagnosis, condition and treatment were explained to patient by Dr Roxanna García. The daughter expressed understanding of discharge instructions, prescriptions, and plan of care. A discharge plan has been developed. A  was not involved in the process. Patient assessed out via personal wheelchair to car with family member.

## 2018-10-29 ENCOUNTER — OFFICE VISIT (OUTPATIENT)
Dept: INTERNAL MEDICINE CLINIC | Age: 78
End: 2018-10-29

## 2018-10-29 VITALS
WEIGHT: 166.9 LBS | HEART RATE: 77 BPM | BODY MASS INDEX: 29.57 KG/M2 | TEMPERATURE: 98 F | HEIGHT: 63 IN | SYSTOLIC BLOOD PRESSURE: 130 MMHG | RESPIRATION RATE: 16 BRPM | OXYGEN SATURATION: 96 % | DIASTOLIC BLOOD PRESSURE: 80 MMHG

## 2018-10-29 DIAGNOSIS — Z79.4 TYPE 2 DIABETES MELLITUS WITH STAGE 3 CHRONIC KIDNEY DISEASE, WITH LONG-TERM CURRENT USE OF INSULIN (HCC): ICD-10-CM

## 2018-10-29 DIAGNOSIS — I10 ESSENTIAL HYPERTENSION, BENIGN: Chronic | ICD-10-CM

## 2018-10-29 DIAGNOSIS — S22.000A CLOSED COMPRESSION FRACTURE OF THORACIC VERTEBRA, INITIAL ENCOUNTER (HCC): Primary | ICD-10-CM

## 2018-10-29 DIAGNOSIS — E11.22 TYPE 2 DIABETES MELLITUS WITH STAGE 3 CHRONIC KIDNEY DISEASE, WITH LONG-TERM CURRENT USE OF INSULIN (HCC): ICD-10-CM

## 2018-10-29 DIAGNOSIS — N18.30 TYPE 2 DIABETES MELLITUS WITH STAGE 3 CHRONIC KIDNEY DISEASE, WITH LONG-TERM CURRENT USE OF INSULIN (HCC): ICD-10-CM

## 2018-10-29 NOTE — PROGRESS NOTES
Chief Complaint   [facility]     ER follow up     Reviewed record in preparation for visit and have obtained necessary documentation. Identified pt with two pt identifiers(name and ). Health Maintenance Due   Topic    Shingrix Vaccine Age 49> (1 of 2)         Chief Complaint   [facility] with   [facility]     ER follow up        Wt Readings from Last 3 Encounters:   10/29/18 166 lb 14.4 oz (75.7 kg)   18 168 lb 1.6 oz (76.2 kg)   18 176 lb (79.8 kg)     Temp Readings from Last 3 Encounters:   10/29/18 98 °F (36.7 °C)   10/27/18 98.8 °F (37.1 °C)   10/26/18 98.7 °F (37.1 °C)     BP Readings from Last 3 Encounters:   10/29/18 130/80   10/27/18 133/59   10/26/18 157/86     Pulse Readings from Last 3 Encounters:   10/29/18 77   10/27/18 84   10/26/18 64           Learning Assessment:  :     Learning Assessment 2017   PRIMARY LEARNER Patient   HIGHEST LEVEL OF EDUCATION - PRIMARY LEARNER  > 4 YEARS OF COLLEGE   BARRIERS PRIMARY LEARNER NONE   PRIMARY LANGUAGE ENGLISH   LEARNER PREFERENCE PRIMARY LISTENING   ANSWERED BY patient   RELATIONSHIP SELF       Depression Screening:  :     PHQ over the last two weeks 2018   Little interest or pleasure in doing things Not at all   Feeling down, depressed, irritable, or hopeless Not at all   Total Score PHQ 2 0       Fall Risk Assessment:  :     Fall Risk Assessment, last 12 mths 2018   Able to walk? Yes   Fall in past 12 months? No       Abuse Screening:  :     Abuse Screening Questionnaire 2017   Do you ever feel afraid of your partner? N   Are you in a relationship with someone who physically or mentally threatens you? N   Is it safe for you to go home? Y       Coordination of Care Questionnaire:  :     1) Have you been to an emergency room, urgent care clinic since your last visit?  yes   Hospitalized since your last visit? no             2) Have you seen or consulted any other health care providers outside of [address] [place] [address] since your last visit? no  (Include any pap smears or colon screenings in this section.)    3) Do you have an Advance Directive on file? yes    4) Are you interested in receiving information on Advance Directives? NO      Patient is accompanied by daughter I have received verbal consent from [place] to discuss any/all medical information while they are present in the room. Reviewed record  In preparation for visit and have obtained necessary documentation.

## 2018-10-29 NOTE — PATIENT INSTRUCTIONS
Use lidocaine patch to areas of tenderness (low neck and mid back) once daily for 12 hours. Use Tiger balm to area when patches not in place. Follow up with neurosurgeon as recommended. Continue your other medications as prescribed.

## 2018-10-29 NOTE — PROGRESS NOTES
Subjective:     Chief Complaint   Patient presents with   [facility] Follow Up     ER follow up     She  is a 66y.o. year old female who presents for evaluation. Ivan Rodriguez Friday morning and was in ED. Went back the next day and had MRI and got pain medications. Told that she had two vertebral fractures. (T1 and T12). Sees neurosurgeon in two weeks. Historical Data    Past Medical History:   Diagnosis Date    Arthritis osteoporosis    Cataract     Diabetes ([facility] [address]     Endocrine disease 1972    adrenal insufficiency - bilat adrenalectomy    GERD (gastroesophageal reflux disease)     H/O Cushing disease     Hypertension     Other ill-defined conditions(799.89)     elizabeth's disease    Stroke ([facility] [address] 2016    TIA 2011 & L occipial stroke 4/2016    Thyroid disease hypothyroid    UTI (lower urinary tract infection)         Past Surgical History:   Procedure Laterality Date    ENDOCRINE SURGERY PROC UNLISTED Bilateral 1972    bilat adrenalectomy in 1972    HX APPENDECTOMY      HX CATARACT REMOVAL Left     ON the left    HX CHOLECYSTECTOMY      HX CHOLECYSTECTOMY  2000    HX HYSTERECTOMY      HX TONSILLECTOMY          Outpatient Encounter Medications as of 10/29/2018   Medication Sig Dispense Refill    traMADol (ULTRAM) 50 mg tablet Take 1 Tab by mouth every six (6) hours as needed for Pain. Max Daily Amount: 200 mg. 20 Tab 0    amLODIPine (NORVASC) 5 mg tablet TAKE ONE TABLET BY MOUTH EVERY DAY 30 Tab 4    lisinopril (PRINIVIL, ZESTRIL) 20 mg tablet TAKE ONE TABLET BY MOUTH EVERY DAY FOR HIGH BLOOD PRESSURE 16 Tab 4    levothyroxine (SYNTHROID) 100 mcg tablet Take 1 Tab by mouth Daily (before breakfast). 90 Tab 1    escitalopram oxalate (LEXAPRO) 10 mg tablet Take 1 Tab by mouth daily.  Indications: major depressive disorder 30 Tab 3    insulin lispro (HUMALOG KWIKPEN INSULIN) 100 unit/mL kwikpen Inject 8 units TIDcc 5 Pen 3    insulin degludec (TRESIBA FLEXTOUCH U-100) 100 unit/mL (3 mL) inpn 40 Units by SubCUTAneous route daily. 15 mL 3    loperamide HCl (IMODIUM PO) Take  by mouth.  clotrimazole-betamethasone (LOTRISONE) topical cream Apply  to affected area two (2) times a day.  naproxen (NAPROSYN) 500 mg tablet Take 500 mg by mouth two (2) times daily (with meals).  OTHER Indications: accu-chek three times a day      VICTOZA 3-EDGARDO 0.6 mg/0.1 mL (18 mg/3 mL) pnij INJECT 1.8ML SUBCUTANEOUSLY DAILY 9 mL 3    BD ULTRA-FINE MINI PEN NEEDLE 31 gauge x 3/16\" ndle USE TO INJECT TRESIBA AND VICTOZA AS DIRECTED 30 Pen Needle 3    PRODIGY NO CODING strip USE TO TEST BLOOD SUGAR 3 TIMES A  Strip 5    furosemide (LASIX) 40 mg tablet Take 1 Tab by mouth daily. 90 Tab 3    metFORMIN ER (GLUCOPHAGE XR) 500 mg tablet Take 1 Tab by mouth two (2) times daily (with meals). 180 Tab 3    hydrocortisone (CORTEF) 5 mg tablet TAKE 3 TABLETS IN THE MORNING AND TAKE 1 TABLET IN THE EVENING 120 Tab 10    fludrocortisone (FLORINEF) 0.1 mg tablet Take 1 Tab by mouth daily. 90 Tab 3    aspirin 81 mg chewable tablet Take 1 Tab by mouth daily. 100 Tab 3    atorvastatin (LIPITOR) 80 mg tablet Take 1 Tab by mouth daily. 90 Tab 3    cranberry 500 mg capsule Take 1 Cap by mouth daily. 100 Cap 3    CULTURELLE PROBIOTICS 10 billion cell -200 mg cpSP Take 1 Cap by mouth two (2) times a day. 100 Cap 5    trospium (SANCTURA) 20 mg tablet TAKE 1 TABLET BY MOUTH TWICE A DAY. 3    ESTRACE 0.01 % (0.1 mg/gram) vaginal cream   3    glucose blood VI test strips (PRODIGY NO CODING) strip Patient test glucose 3 times daily. ICD E11.9 150 Strip 1     No facility-administered encounter medications on file as of 10/29/2018.          Allergies   Allergen Reactions    Amoxicillin Unknown (comments)    Erythromycin Rash and Unknown (comments)     fever        Social History     Socioeconomic History    Marital status:      Spouse name: Not on file    Number of children: Not on file    Years of education: Not on file    Highest education level: Not on file   Social Needs    Financial resource strain: Not on file    Food insecurity - worry: Not on file    Food insecurity - inability: Not on file    Transportation needs - medical: Not on file   NSS Labs needs - non-medical: Not on file   Occupational History    Not on file   Tobacco Use    Smoking status: Never Smoker    Smokeless tobacco: Never Used   Substance and Sexual Activity    Alcohol use: No    Drug use: No    Sexual activity: Not Currently   Other Topics Concern    Not on file   Social History Narrative    Not on file        Review of Systems  Pertinent items are noted in HPI. Objective:     Vitals:    10/29/18 1505   BP: 130/80   Pulse: 77   Resp: 16   Temp: 98 °F (36.7 °C)   SpO2: 96%   Weight: 166 lb 14.4 oz (75.7 kg)   Height: 5' 3\" (1.6 m)     Pleasant white female in on acute distress. Uses walker. ENT: WNL  Cardiac: RRR without murmurs, gallops or rubs. Lungs: Clear to auscultation. Neuro: No focal motor deficits. Skin: No lesions. Back: Tender over T1 and T12.      ASSESSMENT / PLAN:   1. Closed compression fracture of thoracic vertebra, initial encounter [facility])  · Seeing neurosurgeon in two weeks. · Trial of topical Lidocaine alternating with Tiger Balm. 2. Essential hypertension, benign  · Continue current Rx    3. Type 2 diabetes mellitus with stage 3 chronic kidney disease, with long-term current use of insulin (HCC)  · On Tresiba and Humalog. Patient Instructions   Use lidocaine patch to areas of tenderness (low neck and mid back) once daily for 12 hours. Use Tiger balm to area when patches not in place. Follow up with neurosurgeon as recommended. Continue your other medications as prescribed. Follow-up Disposition:  Return in about 3 weeks (around 11/19/2018).    Advised her to call back or return to office if symptoms worsen/change/persist.  Discussed expected course/resolution/complications of diagnosis in detail with patient. Medication risks/benefits/costs/interactions/alternatives discussed with patient. She was given an after visit summary which includes diagnoses, current medications, & vitals. She expressed understanding with the diagnosis and plan.

## 2018-10-30 ENCOUNTER — HOSPITAL ENCOUNTER (EMERGENCY)
Age: 78
Discharge: NURSING, REHAB OR LONG-TERM CARE | End: 2018-10-31
Attending: EMERGENCY MEDICINE | Admitting: EMERGENCY MEDICINE
Payer: MEDICARE

## 2018-10-30 DIAGNOSIS — R07.9 ACUTE CHEST PAIN: Primary | ICD-10-CM

## 2018-10-30 DIAGNOSIS — E16.2 HYPOGLYCEMIA: ICD-10-CM

## 2018-10-30 PROCEDURE — 99285 EMERGENCY DEPT VISIT HI MDM: CPT

## 2018-10-31 ENCOUNTER — APPOINTMENT (OUTPATIENT)
Dept: GENERAL RADIOLOGY | Age: 78
End: 2018-10-31
Attending: EMERGENCY MEDICINE
Payer: MEDICARE

## 2018-10-31 VITALS
OXYGEN SATURATION: 94 % | BODY MASS INDEX: 29.57 KG/M2 | WEIGHT: 166.89 LBS | TEMPERATURE: 98.1 F | SYSTOLIC BLOOD PRESSURE: 163 MMHG | RESPIRATION RATE: 16 BRPM | DIASTOLIC BLOOD PRESSURE: 61 MMHG | HEIGHT: 63 IN | HEART RATE: 71 BPM

## 2018-10-31 LAB
ALBUMIN SERPL-MCNC: 3.5 G/DL (ref 3.5–5)
ALBUMIN/GLOB SERPL: 1 {RATIO} (ref 1.1–2.2)
ALP SERPL-CCNC: 82 U/L (ref 45–117)
ALT SERPL-CCNC: 28 U/L (ref 12–78)
ANION GAP SERPL CALC-SCNC: 9 MMOL/L (ref 5–15)
AST SERPL-CCNC: 35 U/L (ref 15–37)
ATRIAL RATE: 71 BPM
BASOPHILS # BLD: 0.1 K/UL (ref 0–0.1)
BASOPHILS NFR BLD: 0 % (ref 0–1)
BILIRUB SERPL-MCNC: 0.7 MG/DL (ref 0.2–1)
BUN SERPL-MCNC: 33 MG/DL (ref 6–20)
BUN/CREAT SERPL: 20 (ref 12–20)
CALCIUM SERPL-MCNC: 8.5 MG/DL (ref 8.5–10.1)
CALCULATED P AXIS, ECG09: 76 DEGREES
CALCULATED R AXIS, ECG10: -67 DEGREES
CALCULATED T AXIS, ECG11: 24 DEGREES
CHLORIDE SERPL-SCNC: 106 MMOL/L (ref 97–108)
CO2 SERPL-SCNC: 29 MMOL/L (ref 21–32)
COMMENT, HOLDF: NORMAL
CREAT SERPL-MCNC: 1.61 MG/DL (ref 0.55–1.02)
DIAGNOSIS, 93000: NORMAL
DIFFERENTIAL METHOD BLD: ABNORMAL
EOSINOPHIL # BLD: 0.2 K/UL (ref 0–0.4)
EOSINOPHIL NFR BLD: 1 % (ref 0–7)
ERYTHROCYTE [DISTWIDTH] IN BLOOD BY AUTOMATED COUNT: 13.6 % (ref 11.5–14.5)
GLOBULIN SER CALC-MCNC: 3.5 G/DL (ref 2–4)
GLUCOSE BLD STRIP.AUTO-MCNC: 108 MG/DL (ref 65–100)
GLUCOSE BLD STRIP.AUTO-MCNC: 143 MG/DL (ref 65–100)
GLUCOSE BLD STRIP.AUTO-MCNC: 46 MG/DL (ref 65–100)
GLUCOSE BLD STRIP.AUTO-MCNC: 49 MG/DL (ref 65–100)
GLUCOSE BLD STRIP.AUTO-MCNC: 50 MG/DL (ref 65–100)
GLUCOSE BLD STRIP.AUTO-MCNC: 55 MG/DL (ref 65–100)
GLUCOSE SERPL-MCNC: 53 MG/DL (ref 65–100)
HCT VFR BLD AUTO: 39.1 % (ref 35–47)
HGB BLD-MCNC: 12.5 G/DL (ref 11.5–16)
IMM GRANULOCYTES # BLD: 0.1 K/UL (ref 0–0.04)
IMM GRANULOCYTES NFR BLD AUTO: 0 % (ref 0–0.5)
LYMPHOCYTES # BLD: 3.5 K/UL (ref 0.8–3.5)
LYMPHOCYTES NFR BLD: 22 % (ref 12–49)
MCH RBC QN AUTO: 26.9 PG (ref 26–34)
MCHC RBC AUTO-ENTMCNC: 32 G/DL (ref 30–36.5)
MCV RBC AUTO: 84.3 FL (ref 80–99)
MONOCYTES # BLD: 1.1 K/UL (ref 0–1)
MONOCYTES NFR BLD: 7 % (ref 5–13)
NEUTS SEG # BLD: 11 K/UL (ref 1.8–8)
NEUTS SEG NFR BLD: 69 % (ref 32–75)
NRBC # BLD: 0 K/UL (ref 0–0.01)
NRBC BLD-RTO: 0 PER 100 WBC
P-R INTERVAL, ECG05: 176 MS
PLATELET # BLD AUTO: 336 K/UL (ref 150–400)
PMV BLD AUTO: 9.5 FL (ref 8.9–12.9)
POTASSIUM SERPL-SCNC: 3 MMOL/L (ref 3.5–5.1)
PROT SERPL-MCNC: 7 G/DL (ref 6.4–8.2)
Q-T INTERVAL, ECG07: 430 MS
QRS DURATION, ECG06: 128 MS
QTC CALCULATION (BEZET), ECG08: 467 MS
RBC # BLD AUTO: 4.64 M/UL (ref 3.8–5.2)
SAMPLES BEING HELD,HOLD: NORMAL
SERVICE CMNT-IMP: ABNORMAL
SODIUM SERPL-SCNC: 144 MMOL/L (ref 136–145)
TROPONIN I SERPL-MCNC: <0.05 NG/ML
VENTRICULAR RATE, ECG03: 71 BPM
WBC # BLD AUTO: 15.9 K/UL (ref 3.6–11)

## 2018-10-31 PROCEDURE — 36415 COLL VENOUS BLD VENIPUNCTURE: CPT | Performed by: EMERGENCY MEDICINE

## 2018-10-31 PROCEDURE — 80053 COMPREHEN METABOLIC PANEL: CPT | Performed by: EMERGENCY MEDICINE

## 2018-10-31 PROCEDURE — 74011000250 HC RX REV CODE- 250: Performed by: EMERGENCY MEDICINE

## 2018-10-31 PROCEDURE — 82962 GLUCOSE BLOOD TEST: CPT

## 2018-10-31 PROCEDURE — 71045 X-RAY EXAM CHEST 1 VIEW: CPT

## 2018-10-31 PROCEDURE — 84484 ASSAY OF TROPONIN QUANT: CPT | Performed by: EMERGENCY MEDICINE

## 2018-10-31 PROCEDURE — 85025 COMPLETE CBC W/AUTO DIFF WBC: CPT | Performed by: EMERGENCY MEDICINE

## 2018-10-31 PROCEDURE — 93005 ELECTROCARDIOGRAM TRACING: CPT

## 2018-10-31 PROCEDURE — 96374 THER/PROPH/DIAG INJ IV PUSH: CPT

## 2018-10-31 RX ORDER — SODIUM CHLORIDE 0.9 % (FLUSH) 0.9 %
5-10 SYRINGE (ML) INJECTION AS NEEDED
Status: DISCONTINUED | OUTPATIENT
Start: 2018-10-31 | End: 2018-10-31 | Stop reason: HOSPADM

## 2018-10-31 RX ORDER — DEXTROSE 50 % IN WATER (D50W) INTRAVENOUS SYRINGE
25 AS NEEDED
Status: DISCONTINUED | OUTPATIENT
Start: 2018-10-31 | End: 2018-10-31 | Stop reason: HOSPADM

## 2018-10-31 RX ORDER — DEXTROSE 50 % IN WATER (D50W) INTRAVENOUS SYRINGE
Status: DISCONTINUED
Start: 2018-10-31 | End: 2018-10-31 | Stop reason: HOSPADM

## 2018-10-31 RX ORDER — SODIUM CHLORIDE 0.9 % (FLUSH) 0.9 %
5-10 SYRINGE (ML) INJECTION EVERY 8 HOURS
Status: DISCONTINUED | OUTPATIENT
Start: 2018-10-31 | End: 2018-10-31 | Stop reason: HOSPADM

## 2018-10-31 RX ADMIN — Medication 10 ML: at 01:33

## 2018-10-31 RX ADMIN — DEXTROSE MONOHYDRATE 12.5 G: 25 INJECTION, SOLUTION INTRAVENOUS at 01:33

## 2018-10-31 NOTE — ED NOTES
Dr. Ankit Xie gave and reviewed discharge instructions with the patient. The patient verbalized understanding. The patient was given opportunity for questions. Patient discharged in stable condition to the waiting room via stretcher with EMS.

## 2018-10-31 NOTE — ED PROVIDER NOTES
66 y.o. female with extensive past medical history, please see list, significant for Dementia, DM, HTN, adrenal insufficiency s/p bilateral adrenalectomy, GERD, Cushing's and Elizabeth's, h/o stroke, presents via EMS complaining of headache, back pain, and chest pain. Per EMS, pt fell 4 days ago and has been complaining of chest pressure, headaches, back pain, neck pain, and shortness of breath. Pt seen s/p fall 10/26/2018 at [facility] & 10/27/2018 at [facility]. Head CT and cervical spine CT showed no acute findings. Lumbar CT showed a T12 fracture. MRI thoracic spine showed acute mild compression fractures of T1 and T12 without significant retropulsion. Patient has continued to complain of pain since discharge, so patient's facility sent her back to the ED for further evaluation. Pt was given ASA by nursing home staff PTA of EMS tonight. HPI overall limited due to the patient's history of dementia. PCP: Katey Ferro MD 
Social Hx: Patient is a resident of a memory care unit. Full history, physical exam, and ROS unable to be obtained due to:  dementia. Note written by Geronimo Khanna. Riley Leiva, as dictated by Delfino Quach MD 11:59 PM 
 
 
 
The history is provided by the EMS personnel, medical records and the patient. The history is limited by the condition of the patient (h/o dementia). No  was used. Past Medical History:  
Diagnosis Date  Arthritis osteoporosis  Cataract  Diabetes ([facility] [address]  Endocrine disease 1972  
 adrenal insufficiency - bilat adrenalectomy  GERD (gastroesophageal reflux disease)  H/O Cushing disease  Hypertension  Other ill-defined conditions(799.89)   
 elizabeth's disease  Stroke [facility]) 2016 TIA 2011 & L occipial stroke 4/2016  Thyroid disease hypothyroid  UTI (lower urinary tract infection) Past Surgical History:  
Procedure Laterality Date  ENDOCRINE SURGERY PROC UNLISTED Bilateral 0305 bilat adrenalectomy in 1972  HX APPENDECTOMY  HX CATARACT REMOVAL Left ON the left  HX CHOLECYSTECTOMY [place]  HX HYSTERECTOMY  HX TONSILLECTOMY Family History:  
Problem Relation Age of Onset  Cancer Mother  Diabetes Mother  Stroke Mother  Psychiatric Disorder Mother  Dementia Mother  Headache Mother  Heart Disease Father  Psychiatric Disorder Father  Stroke Father  Asthma Sister  Diabetes Sister  Asthma Brother  Heart Disease Brother Social History Socioeconomic History  Marital status:  Spouse name: Not on file  Number of children: Not on file  Years of education: Not on file  Highest education level: Not on file Social Needs  Financial resource strain: Not on file  Food insecurity - worry: Not on file  Food insecurity - inability: Not on file  Transportation needs - medical: Not on file  Transportation needs - non-medical: Not on file Occupational History  Not on file Tobacco Use  Smoking status: Never Smoker  Smokeless tobacco: Never Used Substance and Sexual Activity  Alcohol use: No  
 Drug use: No  
 Sexual activity: Not Currently Other Topics Concern  Not on file Social History Narrative  Not on file ALLERGIES: Amoxicillin and Erythromycin Review of Systems Unable to perform ROS: Dementia Vitals:  
 10/30/18 2349 10/30/18 2350 10/31/18 0000 10/31/18 0015 BP: 156/74 156/74 149/66 167/74 Pulse:  72  75 Resp:  16  14 Temp:  98.1 °F (36.7 °C) SpO2:  100%  100% Weight:  75.7 kg (166 lb 14.2 oz) Height:  5' 3\" (1.6 m) Physical Exam  
Constitutional: She appears well-developed and well-nourished. HENT:  
Head: Normocephalic and atraumatic. Eyes: Conjunctivae are normal.  
Neck: No tracheal deviation present. Cardiovascular: Normal rate.   
Pulmonary/Chest: Effort normal.  
 Abdominal: She exhibits no distension. Musculoskeletal: She exhibits no edema. TTP of thoracic spine region Neurological: She is alert. Moderate to severe dementia Skin: Skin is dry. Nursing note and vitals reviewed. Note written by Arsen Haskins. Franc Whiteibvani, as dictated by Devorah Vickers MD 11:59 PM 
 
 
MDM Procedures ED EKG interpretation: 
Rhythm: normal sinus rhythm; Rate (approx.): 71 bpm; Axis: left axis deviation; Nonspecific IVCD. Note written by Arsen Haskins. Iam Hurley, as dictated by Devorah Vickers MD 12:16 AM  
 
2:39 AM 
Progress Note: 
Results, treatment, and follow up plan have been discussed with patient. Questions were answered. Pao Lynch MD 
 
A/P: CP, HA, back pain S/P recent fall; seen in the ED @ and 3 days ago and had MRI that showed mild compression Fx's of T1 and T12; reassuring appearance and exam; VSS; CBC, CMP (hypoglycemia noted), trop, EKG, CXR ok (or at baseline); home with PCP f/u.   Suspect hypoglycemia due to insulin given but not eating; given half amp D50 in ED and ate part of meal.  Pao Lynch MD

## 2018-10-31 NOTE — DISCHARGE INSTRUCTIONS
Return for persistent or worsening symptoms. Chest Pain: Care Instructions  Your Care Instructions    There are many things that can cause chest pain. Some are not serious and will get better on their own in a few days. But some kinds of chest pain need more testing and treatment. Your doctor may have recommended a follow-up visit in the next 8 to 12 hours. If you are not getting better, you may need more tests or treatment. Even though your doctor has released you, you still need to watch for any problems. The doctor carefully checked you, but sometimes problems can develop later. If you have new symptoms or if your symptoms do not get better, get medical care right away. If you have worse or different chest pain or pressure that lasts more than 5 minutes or you passed out (lost consciousness), call 911 or seek other emergency help right away. A medical visit is only one step in your treatment. Even if you feel better, you still need to do what your doctor recommends, such as going to all suggested follow-up appointments and taking medicines exactly as directed. This will help you recover and help prevent future problems. How can you care for yourself at home? · Rest until you feel better. · Take your medicine exactly as prescribed. Call your doctor if you think you are having a problem with your medicine. · Do not drive after taking a prescription pain medicine. When should you call for help? Call 911 if:    · You passed out (lost consciousness).     · You have severe difficulty breathing.     · You have symptoms of a heart attack. These may include:  ? Chest pain or pressure, or a strange feeling in your chest.  ? Sweating. ? Shortness of breath. ? Nausea or vomiting. ? Pain, pressure, or a strange feeling in your back, neck, jaw, or upper belly or in one or both shoulders or arms. ? Lightheadedness or sudden weakness. ? A fast or irregular heartbeat.   After you call 911, the  may tell you to chew 1 adult-strength or 2 to 4 low-dose aspirin. Wait for an ambulance. Do not try to drive yourself.    Call your doctor today if:    · You have any trouble breathing.     · Your chest pain gets worse.     · You are dizzy or lightheaded, or you feel like you may faint.     · You are not getting better as expected.     · You are having new or different chest pain. Where can you learn more? Go to http://love-beth.info/. Enter A120 in the search box to learn more about \"Chest Pain: Care Instructions. \"  Current as of: November 20, 2017  Content Version: 11.8  © 8823-9867 [employer]. Care instructions adapted under license by [employer] (which disclaims liability or warranty for this information). If you have questions about a medical condition or this instruction, always ask your healthcare professional. [address] any warranty or liability for your use of this information. Learning About Low Blood Sugar (Hypoglycemia) in Diabetes  What is low blood sugar (hypoglycemia)? Hypoglycemia means that your blood sugar is low and your body (especially your brain) is not getting enough fuel. If you have diabetes, your blood sugar can go too low if you take too much of some diabetes medicines. It can also go too low if you miss a meal. And it can happen if you exercise too hard without eating enough food. Some medicines used to treat other health problems can cause low blood sugar too. What are the symptoms? Symptoms of low blood sugar can start quickly. It may take just 10 to 15 minutes. If you have had diabetes for many years, you may not realize that your blood sugar is low until it drops very low. · If your blood sugar level drops below 70 (mild low blood sugar), you may feel tired, anxious, dizzy, weak, shaky, or sweaty. You may have a fast heartbeat or blurry vision.   · If your blood sugar level continues to drop (usually below 40), your behavior may change. You may feel more irritable. You may find it hard to concentrate or talk. And you may feel unsteady when you stand or walk. You may become too weak or confused to eat something with sugar to raise your blood sugar level. · If your blood sugar level drops very low (usually below 20), you may pass out (lose consciousness). Or you may have a seizure or stroke. If you have symptoms of severe low blood sugar, you need to get medical care right away. If you had a low blood sugar level during the night, you may wake up tired or with a headache. Or you may sweat so much during the night that your pajamas or sheets are damp when you wake up. How is low blood sugar treated? You can treat low blood sugar by eating or drinking something that has 15 grams of carbohydrate. These should be quick-sugar foods. Check your blood sugar level again 15 minutes after having a quick-sugar food to make sure your level is getting back to your target range. Here are examples of quick-sugar foods that have 15 grams of carbohydrate:  · 3 to 4 glucose tablets  · 1 tube of glucose gel  · Hard candy (such as 3 Jolly Ranchers or 5 to 7 Life Savers)  · 1 tablespoon honey  · 2 tablespoons of raisins  · ½ cup to ¾ cup (4 to 6 ounces) of fruit juice or regular (not diet) soda  · 1 tablespoon of sugar  · 1 cup of fat-free milk  If you have problems with severe low blood sugar, someone else may have to give you a shot of glucagon. This is a hormone that raises blood sugar levels quickly. How can you prevent low blood sugar? You can take steps to prevent low blood sugar. · Follow your treatment plan. Take your insulin or other diabetes medicine exactly as your doctor prescribed it. Talk with your doctor if you're having low blood sugar often. Your medicine may need to be adjusted if it's causing your low blood sugar. · Check your blood sugar levels often.  This helps you find early changes before an emergency happens. · Keep a quick-sugar food with you in case your blood sugar level drops low. · Eat small meals more often so that you don't get too hungry between meals. Don't skip meals. · Balance extra exercise with eating more. Check your blood sugar and learn how it changes after exercise. If your blood sugar stays at a normal level, you may not need to eat after you exercise. · Limit how much alcohol you drink. Alcohol can make low blood sugar go even lower. Don't drink alcohol if you have problems recognizing the early signs of low blood sugar. · Keep a diary of your symptoms. This helps you learn when changes in your body may signal low blood sugar. And keep track of how often you have low blood sugar, including when you last ate and what you ate. This will help you learn what causes your blood sugar to drop. · Learn about diabetes and low blood sugar. Support groups or a diabetes education center can help you understand how medicines, diet, and exercise affect your blood sugar levels. Since low blood sugar levels can quickly become an emergency, be sure to wear medical alert jewelry, such as a medical alert bracelet. This is to let people know you have diabetes so they can get help for you. You can buy this at most drugstores. And make sure your family, friends, and coworkers know the symptoms of low blood sugar. Teach them what to do to get your sugar level up. Follow-up care is a key part of your treatment and safety. Be sure to make and go to all appointments, and call your doctor if you are having problems. It's also a good idea to know your test results and keep a list of the medicines you take. Where can you learn more? Go to http://love-beth.info/. Enter C262 in the search box to learn more about \"Learning About Low Blood Sugar (Hypoglycemia) in Diabetes. \"  Current as of: December 7, 2017  Content Version: 11.8  © 7987-9811 [employer], [employer].  Care instructions adapted under license by [employer] (which disclaims liability or warranty for this information). If you have questions about a medical condition or this instruction, always ask your healthcare professional. [address] any warranty or liability for your use of this information.
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
## 2018-11-14 ENCOUNTER — PATIENT MESSAGE (OUTPATIENT)
Dept: ENDOCRINOLOGY | Age: 78
End: 2018-11-14

## 2018-11-27 RX ORDER — LIRAGLUTIDE 6 MG/ML
INJECTION SUBCUTANEOUS
Qty: 9 ML | Refills: 5 | Status: SHIPPED | OUTPATIENT
Start: 2018-11-27 | End: 2018-11-28 | Stop reason: SDUPTHER

## 2018-11-28 RX ORDER — LIRAGLUTIDE 6 MG/ML
INJECTION SUBCUTANEOUS
Qty: 9 ML | Refills: 0 | Status: SHIPPED | OUTPATIENT
Start: 2018-11-28 | End: 2019-04-23

## 2018-12-05 NOTE — TELEPHONE ENCOUNTER
From: Seven Powell  Sent: 12/5/2018 9:54 AM EST  To: [facility] Nurse Pool  Subject: RE: Non-Urgent Medical Question    ----- Message from [address] [place] [address] [id number], [facility] sent at 12/5/2018 9:54 AM EST -----    Can you resend the prescription for this to [facility] at [address] [facility],[address], [address]  Phone: [phone] [facility] has really messed up on delivering the meter and we found out that [facility] is considerably cheaper. Thanks.    ----- Message -----  From: "[employer]  Sent: 11/16/2018 5:21 PM EST  To: Seven Powell  Subject: RE: Non-Urgent Medical Question  [id number] [address]!!  Have a great weekend! ! Stacey Bautista    ----- Message -----   From: Seven Powell   Sent: 11/16/2018 1:56 PM EST   To: Sari Christensen MD  Subject: RE: Non-Urgent Medical Question    I see that the new meter has been called in to the [facility] on [address] [place] [address]. I will pick it up there. Can you please update my file to show that I use [facility] now. Please use them for all future prescriptions. Thanks.  ----- Message -----  From: "[employer]  Sent: 11/16/2018 12:41 PM EST  To: Seven Powell  Subject: RE: Non-Urgent Medical Question  Hi Mrs Brent Jain,    [place] sending this request to your insurance to authorize an approval.    Stacey Bautista    ----- Message -----   From: Seven Powell   Sent: 11/15/2018 11:30 AM EST   To: Sari Christensen MD  Subject: RE: Non-Urgent Medical Question    I take Novolog before meals (3 times a day), Victoza once a day and [place] once a day. I test blood sugar 4 times a day.  ----- Message -----  From: "[employer]  Sent: 11/14/2018 6:05 PM EST  To: Seven Powell  Subject: RE: Non-Urgent Medical Question  Hi Mrs Brent Jain,    This is Stacey Zuluaga nurse.  How many times during the day are you injecting your insulin and how often are you checking your blood sugars?    ----- Message -----   From: Seven Powell   Sent: 11/14/2018 11:17 AM EST   To: Sari Christensen MD  Subject: Non-Urgent Medical Question    I just saw an ad for the [employer] 14 Day CGM. I was wondering if this would be a good option for me instead of BGM?

## 2019-01-01 ENCOUNTER — TELEPHONE (OUTPATIENT)
Dept: INTERNAL MEDICINE CLINIC | Age: 79
End: 2019-01-01

## 2019-01-01 ENCOUNTER — TELEPHONE (OUTPATIENT)
Dept: ENDOCRINOLOGY | Age: 79
End: 2019-01-01

## 2019-01-01 ENCOUNTER — APPOINTMENT (OUTPATIENT)
Dept: CT IMAGING | Age: 79
End: 2019-01-01
Attending: EMERGENCY MEDICINE
Payer: MEDICARE

## 2019-01-01 ENCOUNTER — OFFICE VISIT (OUTPATIENT)
Dept: ENDOCRINOLOGY | Age: 79
End: 2019-01-01

## 2019-01-01 ENCOUNTER — OFFICE VISIT (OUTPATIENT)
Dept: INTERNAL MEDICINE CLINIC | Age: 79
End: 2019-01-01

## 2019-01-01 ENCOUNTER — HOSPITAL ENCOUNTER (EMERGENCY)
Age: 79
Discharge: HOME | End: 2019-09-12
Attending: EMERGENCY MEDICINE
Payer: MEDICARE

## 2019-01-01 VITALS
SYSTOLIC BLOOD PRESSURE: 112 MMHG | HEART RATE: 73 BPM | BODY MASS INDEX: 30.06 KG/M2 | WEIGHT: 159.2 LBS | DIASTOLIC BLOOD PRESSURE: 46 MMHG | HEIGHT: 61 IN

## 2019-01-01 VITALS
DIASTOLIC BLOOD PRESSURE: 70 MMHG | TEMPERATURE: 98.2 F | HEIGHT: 62 IN | RESPIRATION RATE: 16 BRPM | SYSTOLIC BLOOD PRESSURE: 132 MMHG | OXYGEN SATURATION: 98 % | BODY MASS INDEX: 30.55 KG/M2 | WEIGHT: 166 LBS | HEART RATE: 74 BPM

## 2019-01-01 VITALS
OXYGEN SATURATION: 100 % | DIASTOLIC BLOOD PRESSURE: 60 MMHG | SYSTOLIC BLOOD PRESSURE: 145 MMHG | TEMPERATURE: 97.5 F | HEART RATE: 60 BPM | RESPIRATION RATE: 15 BRPM

## 2019-01-01 DIAGNOSIS — E03.8 CENTRAL HYPOTHYROIDISM: ICD-10-CM

## 2019-01-01 DIAGNOSIS — Z13.39 SCREENING FOR ALCOHOLISM: ICD-10-CM

## 2019-01-01 DIAGNOSIS — F32.A DEPRESSION, UNSPECIFIED DEPRESSION TYPE: Chronic | ICD-10-CM

## 2019-01-01 DIAGNOSIS — Z66 DNR (DO NOT RESUSCITATE): Primary | ICD-10-CM

## 2019-01-01 DIAGNOSIS — E78.2 MIXED HYPERLIPIDEMIA: ICD-10-CM

## 2019-01-01 DIAGNOSIS — R51.9 NONINTRACTABLE HEADACHE, UNSPECIFIED CHRONICITY PATTERN, UNSPECIFIED HEADACHE TYPE: Primary | ICD-10-CM

## 2019-01-01 DIAGNOSIS — Z79.899 ENCOUNTER FOR LONG-TERM (CURRENT) USE OF MEDICATIONS: ICD-10-CM

## 2019-01-01 DIAGNOSIS — E11.22 TYPE 2 DIABETES MELLITUS WITH STAGE 3 CHRONIC KIDNEY DISEASE, WITH LONG-TERM CURRENT USE OF INSULIN (HCC): Primary | ICD-10-CM

## 2019-01-01 DIAGNOSIS — Z00.00 MEDICARE ANNUAL WELLNESS VISIT, SUBSEQUENT: Primary | ICD-10-CM

## 2019-01-01 DIAGNOSIS — F03.90 DEMENTIA WITHOUT BEHAVIORAL DISTURBANCE, UNSPECIFIED DEMENTIA TYPE: ICD-10-CM

## 2019-01-01 DIAGNOSIS — Z13.31 SCREENING FOR DEPRESSION: ICD-10-CM

## 2019-01-01 DIAGNOSIS — I10 BENIGN HYPERTENSION: ICD-10-CM

## 2019-01-01 DIAGNOSIS — E78.5 HYPERLIPIDEMIA LDL GOAL <100: ICD-10-CM

## 2019-01-01 DIAGNOSIS — E27.40 ADRENAL INSUFFICIENCY (HCC): ICD-10-CM

## 2019-01-01 DIAGNOSIS — Z79.4 TYPE 2 DIABETES MELLITUS WITH STAGE 3 CHRONIC KIDNEY DISEASE, WITH LONG-TERM CURRENT USE OF INSULIN (HCC): Primary | ICD-10-CM

## 2019-01-01 DIAGNOSIS — N18.30 TYPE 2 DIABETES MELLITUS WITH STAGE 3 CHRONIC KIDNEY DISEASE, WITH LONG-TERM CURRENT USE OF INSULIN (HCC): Primary | ICD-10-CM

## 2019-01-01 LAB
ANION GAP SERPL CALC-SCNC: 11 MMOL/L (ref 5–15)
APPEARANCE UR: CLEAR
BASOPHILS # BLD: 0.1 K/UL (ref 0–0.1)
BASOPHILS NFR BLD: 1 % (ref 0–1)
BILIRUB UR QL: NEGATIVE
BUN SERPL-MCNC: 18 MG/DL (ref 6–20)
BUN/CREAT SERPL: 17 (ref 12–20)
CALCIUM SERPL-MCNC: 8.9 MG/DL (ref 8.5–10.1)
CHLORIDE SERPL-SCNC: 107 MMOL/L (ref 97–108)
CHOLEST SERPL-MCNC: 99 MG/DL (ref 100–199)
CO2 SERPL-SCNC: 25 MMOL/L (ref 21–32)
COLOR UR: NORMAL
COMMENT, HOLDF: NORMAL
COMMENT, HOLDF: NORMAL
CREAT SERPL-MCNC: 1.05 MG/DL (ref 0.55–1.02)
DIFFERENTIAL METHOD BLD: ABNORMAL
EOSINOPHIL # BLD: 0.3 K/UL (ref 0–0.4)
EOSINOPHIL NFR BLD: 2 % (ref 0–7)
ERYTHROCYTE [DISTWIDTH] IN BLOOD BY AUTOMATED COUNT: 15.7 % (ref 11.5–14.5)
EST. AVERAGE GLUCOSE BLD GHB EST-MCNC: 174 MG/DL
GLUCOSE SERPL-MCNC: 162 MG/DL (ref 65–100)
GLUCOSE UR STRIP.AUTO-MCNC: NEGATIVE MG/DL
HBA1C MFR BLD: 7.7 % (ref 4.8–5.6)
HCT VFR BLD AUTO: 42.2 % (ref 35–47)
HDLC SERPL-MCNC: 39 MG/DL
HGB BLD-MCNC: 12.6 G/DL (ref 11.5–16)
HGB UR QL STRIP: NEGATIVE
IMM GRANULOCYTES # BLD AUTO: 0.1 K/UL (ref 0–0.04)
IMM GRANULOCYTES NFR BLD AUTO: 1 % (ref 0–0.5)
INTERPRETATION, 910389: NORMAL
INTERPRETATION: NORMAL
KETONES UR QL STRIP.AUTO: NEGATIVE MG/DL
LDLC SERPL CALC-MCNC: 33 MG/DL (ref 0–99)
LEUKOCYTE ESTERASE UR QL STRIP.AUTO: NEGATIVE
LYMPHOCYTES # BLD: 3.7 K/UL (ref 0.8–3.5)
LYMPHOCYTES NFR BLD: 25 % (ref 12–49)
Lab: NORMAL
MCH RBC QN AUTO: 25.8 PG (ref 26–34)
MCHC RBC AUTO-ENTMCNC: 29.9 G/DL (ref 30–36.5)
MCV RBC AUTO: 86.3 FL (ref 80–99)
MONOCYTES # BLD: 1.2 K/UL (ref 0–1)
MONOCYTES NFR BLD: 8 % (ref 5–13)
NEUTS SEG # BLD: 9.5 K/UL (ref 1.8–8)
NEUTS SEG NFR BLD: 63 % (ref 32–75)
NITRITE UR QL STRIP.AUTO: NEGATIVE
NRBC # BLD: 0 K/UL (ref 0–0.01)
NRBC BLD-RTO: 0 PER 100 WBC
PDF IMAGE, 910387: NORMAL
PH UR STRIP: 7 [PH] (ref 5–8)
PLATELET # BLD AUTO: 412 K/UL (ref 150–400)
PMV BLD AUTO: 10 FL (ref 8.9–12.9)
POTASSIUM SERPL-SCNC: 3.5 MMOL/L (ref 3.5–5.1)
PROT UR STRIP-MCNC: NEGATIVE MG/DL
RBC # BLD AUTO: 4.89 M/UL (ref 3.8–5.2)
SAMPLES BEING HELD,HOLD: NORMAL
SAMPLES BEING HELD,HOLD: NORMAL
SODIUM SERPL-SCNC: 143 MMOL/L (ref 136–145)
SP GR UR REFRACTOMETRY: 1 (ref 1–1.03)
T4 FREE SERPL-MCNC: 1.9 NG/DL (ref 0.82–1.77)
TRIGL SERPL-MCNC: 136 MG/DL (ref 0–149)
TROPONIN I SERPL-MCNC: <0.05 NG/ML
UROBILINOGEN UR QL STRIP.AUTO: 0.2 EU/DL (ref 0.2–1)
VLDLC SERPL CALC-MCNC: 27 MG/DL (ref 5–40)
WBC # BLD AUTO: 14.9 K/UL (ref 3.6–11)

## 2019-01-01 PROCEDURE — 81003 URINALYSIS AUTO W/O SCOPE: CPT

## 2019-01-01 PROCEDURE — 85025 COMPLETE CBC W/AUTO DIFF WBC: CPT

## 2019-01-01 PROCEDURE — 77030011943

## 2019-01-01 PROCEDURE — 80048 BASIC METABOLIC PNL TOTAL CA: CPT

## 2019-01-01 PROCEDURE — 84484 ASSAY OF TROPONIN QUANT: CPT

## 2019-01-01 PROCEDURE — 70450 CT HEAD/BRAIN W/O DYE: CPT

## 2019-01-01 PROCEDURE — 36415 COLL VENOUS BLD VENIPUNCTURE: CPT

## 2019-01-01 PROCEDURE — 51701 INSERT BLADDER CATHETER: CPT

## 2019-01-01 PROCEDURE — 99285 EMERGENCY DEPT VISIT HI MDM: CPT

## 2019-01-01 RX ORDER — FLUDROCORTISONE ACETATE 0.1 MG/1
0.1 TABLET ORAL DAILY
Qty: 30 TAB | Refills: 11 | Status: SHIPPED | OUTPATIENT
Start: 2019-01-01

## 2019-01-01 RX ORDER — CLOPIDOGREL BISULFATE 75 MG/1
75 TABLET ORAL DAILY
COMMUNITY
End: 2019-01-01 | Stop reason: SDUPTHER

## 2019-01-01 RX ORDER — AMLODIPINE BESYLATE 5 MG/1
TABLET ORAL
Qty: 90 TAB | Refills: 1 | Status: SHIPPED | OUTPATIENT
Start: 2019-01-01

## 2019-01-01 RX ORDER — ACETAMINOPHEN 500 MG
TABLET ORAL
COMMUNITY

## 2019-01-01 RX ORDER — LOPERAMIDE HYDROCHLORIDE 2 MG/1
CAPSULE ORAL
Qty: 30 CAP | Refills: 3 | Status: SHIPPED | OUTPATIENT
Start: 2019-01-01

## 2019-01-01 RX ORDER — MIRABEGRON 50 MG/1
50 TABLET, FILM COATED, EXTENDED RELEASE ORAL DAILY
Qty: 90 TAB | Refills: 1 | Status: SHIPPED | OUTPATIENT
Start: 2019-01-01

## 2019-01-01 RX ORDER — DIPHENHYDRAMINE HCL 25 MG
25 TABLET ORAL
COMMUNITY

## 2019-01-01 RX ORDER — LEVOTHYROXINE SODIUM 112 UG/1
112 TABLET ORAL
Qty: 90 TAB | Refills: 1 | Status: SHIPPED | OUTPATIENT
Start: 2019-01-01

## 2019-01-01 RX ORDER — INSULIN LISPRO 100 [IU]/ML
INJECTION, SOLUTION INTRAVENOUS; SUBCUTANEOUS
Qty: 5 PEN | Refills: 3 | Status: SHIPPED | OUTPATIENT
Start: 2019-01-01

## 2019-01-01 RX ORDER — METFORMIN HYDROCHLORIDE 500 MG/1
TABLET ORAL
Refills: 0 | COMMUNITY
Start: 2019-01-01 | End: 2020-01-01

## 2019-01-01 RX ORDER — CLOPIDOGREL BISULFATE 75 MG/1
75 TABLET ORAL DAILY
Qty: 90 TAB | Refills: 1 | Status: SHIPPED | OUTPATIENT
Start: 2019-01-01

## 2019-01-01 RX ORDER — POTASSIUM CHLORIDE 750 MG/1
TABLET, FILM COATED, EXTENDED RELEASE ORAL
Refills: 0 | COMMUNITY
Start: 2019-01-01 | End: 2019-01-01 | Stop reason: SDUPTHER

## 2019-01-01 RX ORDER — LISINOPRIL 40 MG/1
40 TABLET ORAL DAILY
Qty: 90 TAB | Refills: 1 | Status: SHIPPED | OUTPATIENT
Start: 2019-01-01 | End: 2020-01-01

## 2019-01-01 RX ORDER — POTASSIUM CHLORIDE 750 MG/1
10 TABLET, FILM COATED, EXTENDED RELEASE ORAL 2 TIMES DAILY
Qty: 180 TAB | Refills: 1 | Status: SHIPPED | OUTPATIENT
Start: 2019-01-01

## 2019-01-01 RX ORDER — ESCITALOPRAM OXALATE 10 MG/1
10 TABLET ORAL DAILY
Qty: 90 TAB | Refills: 1 | Status: SHIPPED | OUTPATIENT
Start: 2019-01-01

## 2019-01-01 RX ORDER — ATORVASTATIN CALCIUM 80 MG/1
TABLET, FILM COATED ORAL
Qty: 90 TAB | Refills: 1 | Status: SHIPPED | OUTPATIENT
Start: 2019-01-01

## 2019-01-01 RX ORDER — PEN NEEDLE, DIABETIC 31 GX3/16"
NEEDLE, DISPOSABLE MISCELLANEOUS
Qty: 180 PEN NEEDLE | Refills: 3 | Status: SHIPPED | OUTPATIENT
Start: 2019-01-01

## 2019-01-01 RX ORDER — FUROSEMIDE 40 MG/1
40 TABLET ORAL DAILY
Qty: 90 TAB | Refills: 1 | Status: SHIPPED | OUTPATIENT
Start: 2019-01-01

## 2019-01-01 RX ORDER — LISINOPRIL 40 MG/1
TABLET ORAL
Refills: 0 | COMMUNITY
Start: 2019-01-01 | End: 2019-01-01 | Stop reason: SDUPTHER

## 2019-01-01 RX ORDER — ERGOCALCIFEROL 1.25 MG/1
50000 CAPSULE ORAL
COMMUNITY
End: 2019-01-01

## 2019-01-01 RX ORDER — ONDANSETRON 2 MG/ML
4 INJECTION INTRAMUSCULAR; INTRAVENOUS
Status: DISCONTINUED | OUTPATIENT
Start: 2019-01-01 | End: 2019-01-01 | Stop reason: HOSPADM

## 2019-01-01 RX ORDER — ASCORBIC ACID 500 MG
500 TABLET ORAL DAILY
COMMUNITY

## 2019-01-01 RX ORDER — LIRAGLUTIDE 6 MG/ML
1.8 INJECTION SUBCUTANEOUS DAILY
Qty: 3 PEN | Refills: 11 | Status: SHIPPED | OUTPATIENT
Start: 2019-01-01

## 2019-01-01 RX ORDER — MIRABEGRON 50 MG/1
TABLET, FILM COATED, EXTENDED RELEASE ORAL
Refills: 11 | COMMUNITY
Start: 2019-01-01 | End: 2019-01-01 | Stop reason: SDUPTHER

## 2019-01-01 RX ORDER — INSULIN DEGLUDEC INJECTION 100 U/ML
INJECTION, SOLUTION SUBCUTANEOUS
Qty: 15 ML | Refills: 11 | Status: SHIPPED | OUTPATIENT
Start: 2019-01-01

## 2019-01-01 RX ORDER — LISINOPRIL 40 MG/1
40 TABLET ORAL DAILY
Qty: 90 TAB | Refills: 0 | Status: SHIPPED | OUTPATIENT
Start: 2019-01-01 | End: 2019-01-01 | Stop reason: SDUPTHER

## 2019-01-15 NOTE — PROGRESS NOTES
Lab Results   Component Value Date/Time    TSH 0.051 (L) 04/26/2018 10:53 AM    TSH 0.090 (L) 03/27/2018 10:25 AM    T4, Free 1.36 03/27/2018 10:25 AM    T4, Total 10.1 04/23/2011 04:07 AM 154.1

## 2019-01-22 ENCOUNTER — TELEPHONE (OUTPATIENT)
Dept: INTERNAL MEDICINE CLINIC | Age: 79
End: 2019-01-22

## 2019-01-23 NOTE — TELEPHONE ENCOUNTER
Donovan Martin,     There is no contact information Todd at [facility] on the encounter nor is it in the pt's chart. Would you happen to have that information still on your phone?

## 2019-01-23 NOTE — TELEPHONE ENCOUNTER
9:56 pm call from Janeth Herrera at [facility] reporting patient has blood sugar of 548. She was given 40 units of Tresiba about 90 minutes ago. She had 8 units of Humalog at dinner (her usual). Sugars over the last few days have been running high (468 yesterday). Sugars are checked at fasting, 4 pm and 9 pm.  Also gets Metformin 500 mg BID.       Plan: Give 8 units Humalog now   Call Dr. Cesar Gibbs office tomorrow for further instruction

## 2019-01-23 NOTE — TELEPHONE ENCOUNTER
Alka, please call and make sure she is still receiving Victoza. The nurse did not mention this medication. Also, follow the correction scale below to add on to her standing 8 units for pre-meal sugars over 150. Correction Scale:  If blood sugar is over 150 before a meal, add Humalog insulin according to the scale below:  150-199: 2 units  200-249: 4 units  250-299: 6 units   300-349: 8 units  350-399: 10 units  Over 400: 12 units and notify me

## 2019-01-25 ENCOUNTER — TELEPHONE (OUTPATIENT)
Dept: ENDOCRINOLOGY | Age: 79
End: 2019-01-25

## 2019-01-25 RX ORDER — LEVOTHYROXINE SODIUM 100 UG/1
100 TABLET ORAL
Qty: 90 TAB | Refills: 1 | Status: SHIPPED | OUTPATIENT
Start: 2019-01-25 | End: 2019-02-08 | Stop reason: SDUPTHER

## 2019-01-25 NOTE — TELEPHONE ENCOUNTER
1/25/2019  2:41 PM    Patient nurse at [facility] called in stating that she contacted the Pharmacy to initiate patient refill for   levothyroxine (SYNTHROID) 100 mcg tablet [[id number]]  Pharmacy stated that the order they have is for 88 mcg and that a new order needs to be called in. Patients nurse stated that she put of the medication. Thanks.        Call Back Number [phone]

## 2019-01-27 PROBLEM — Z92.89 HISTORY OF MAMMOGRAPHY, SCREENING: Chronic | Status: ACTIVE | Noted: 2019-01-27

## 2019-01-27 PROBLEM — Z12.11 COLON CANCER SCREENING: Chronic | Status: ACTIVE | Noted: 2019-01-27

## 2019-01-31 ENCOUNTER — TELEPHONE (OUTPATIENT)
Dept: INTERNAL MEDICINE CLINIC | Age: 79
End: 2019-01-31

## 2019-01-31 ENCOUNTER — OFFICE VISIT (OUTPATIENT)
Dept: INTERNAL MEDICINE CLINIC | Age: 79
End: 2019-01-31

## 2019-01-31 ENCOUNTER — HOSPITAL ENCOUNTER (OUTPATIENT)
Dept: LAB | Age: 79
Discharge: HOME | End: 2019-01-31
Payer: MEDICARE

## 2019-01-31 VITALS
WEIGHT: 175 LBS | HEART RATE: 61 BPM | DIASTOLIC BLOOD PRESSURE: 84 MMHG | OXYGEN SATURATION: 96 % | SYSTOLIC BLOOD PRESSURE: 156 MMHG | HEIGHT: 63 IN | BODY MASS INDEX: 31.01 KG/M2 | TEMPERATURE: 97.5 F | RESPIRATION RATE: 16 BRPM

## 2019-01-31 DIAGNOSIS — F03.90 DEMENTIA WITHOUT BEHAVIORAL DISTURBANCE, UNSPECIFIED DEMENTIA TYPE: ICD-10-CM

## 2019-01-31 DIAGNOSIS — E03.9 HYPOTHYROIDISM, ADULT: ICD-10-CM

## 2019-01-31 DIAGNOSIS — Z79.4 CONTROLLED TYPE 2 DIABETES MELLITUS WITHOUT COMPLICATION, WITH LONG-TERM CURRENT USE OF INSULIN (HCC): Primary | ICD-10-CM

## 2019-01-31 DIAGNOSIS — I10 BENIGN HYPERTENSION: ICD-10-CM

## 2019-01-31 DIAGNOSIS — Z79.899 ENCOUNTER FOR LONG-TERM (CURRENT) USE OF MEDICATIONS: ICD-10-CM

## 2019-01-31 DIAGNOSIS — E11.9 CONTROLLED TYPE 2 DIABETES MELLITUS WITHOUT COMPLICATION, WITH LONG-TERM CURRENT USE OF INSULIN (HCC): Primary | ICD-10-CM

## 2019-01-31 PROCEDURE — 36415 COLL VENOUS BLD VENIPUNCTURE: CPT

## 2019-01-31 PROCEDURE — 83036 HEMOGLOBIN GLYCOSYLATED A1C: CPT

## 2019-01-31 PROCEDURE — 84443 ASSAY THYROID STIM HORMONE: CPT

## 2019-01-31 PROCEDURE — 80053 COMPREHEN METABOLIC PANEL: CPT

## 2019-01-31 PROCEDURE — 85025 COMPLETE CBC W/AUTO DIFF WBC: CPT

## 2019-01-31 NOTE — PROGRESS NOTES
Subjective:      Ritesh Urena is a 66 y.o. female who presents today to become established. She is here today with her attendant, Georges Diaz. She is with patient twice a week to take her out and to doctor appointments. She lives at [facility]. Prior PCP - Dr. Mohan Kyle - last visit was 10/29/18    Active Medical Problems:  -dementia - -noticing she is getting up in the middle of the night and wandering. She sleeps and watched television most of her days with the exception of the 2 days in which Georges Diaz is with her.   -hypertension  -diabetes mellitus type 2 and hypothyroid - followed by Dr. Tianna Babin. 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    Depression F32.9    Elevated cholesterol E78.00    Vitamin D deficiency E55.9    Adrenal insufficiency (HCC) E27.40    Hypothyroidism E03.9    Intracranial vascular stenosis I67.9    Meningioma ([facility] [address] D32.9    Cerebral infarction due to stenosis of left posterior cerebral artery (HCC) R44.545    Unstable angina (HCC) I20.0    Type 2 diabetes mellitus with stage 3 chronic kidney disease, with long-term current use of insulin (HCC) E11.22, N18.3, Z79.4    Dementia F03.90    History of mammography, screening Z92.89    Colon cancer screening Z12.11     Current Outpatient Medications   Medication Sig Dispense Refill    levothyroxine (SYNTHROID) 100 mcg tablet Take 1 Tab by mouth Daily (before breakfast). 90 Tab 1    flash glucose scanning reader ([employer] EMI 14 DAY READER) [facility] Use to check blood sugars. Change site every 14 days.  2 Each 0    VICTOZA 3-EDGARDO 0.6 mg/0.1 mL (18 mg/3 mL) pnij INJECT 1.8ML SUBCUTANEOUSLY ONCE DAILY 9 mL 0    flash glucose sensor (FREESTYLE EMI 10 DAY SENSOR) kit Use to check blood sugars 4 times a day E11.65 3 Kit 11    flash glucose scanning reader (FREESTYLE EMI 10 DAY READER) misc Use to check blood sugar 4 times a day E11.65 1 Each 0    traMADol (ULTRAM) 50 mg tablet Take 1 Tab by mouth every six (6) hours as needed for Pain. Max Daily Amount: 200 mg. 20 Tab 0    amLODIPine (NORVASC) 5 mg tablet TAKE ONE TABLET BY MOUTH EVERY DAY 30 Tab 4    lisinopril (PRINIVIL, ZESTRIL) 20 mg tablet TAKE ONE TABLET BY MOUTH EVERY DAY FOR HIGH BLOOD PRESSURE 16 Tab 4    escitalopram oxalate (LEXAPRO) 10 mg tablet Take 1 Tab by mouth daily. Indications: major depressive disorder 30 Tab 3    insulin lispro (HUMALOG KWIKPEN INSULIN) 100 unit/mL kwikpen Inject 8 units TIDcc 5 Pen 3    insulin degludec (TRESIBA FLEXTOUCH U-100) 100 unit/mL (3 mL) inpn 40 Units by SubCUTAneous route daily. 15 mL 3    BD ULTRA-FINE MINI PEN NEEDLE 31 gauge x 3/16\" ndle USE TO INJECT TRESIBA AND VICTOZA AS DIRECTED 30 Pen Needle 3    furosemide (LASIX) 40 mg tablet Take 1 Tab by mouth daily. 90 Tab 3    metFORMIN ER (GLUCOPHAGE XR) 500 mg tablet Take 1 Tab by mouth two (2) times daily (with meals). 180 Tab 3    hydrocortisone (CORTEF) 5 mg tablet TAKE 3 TABLETS IN THE MORNING AND TAKE 1 TABLET IN THE EVENING 120 Tab 10    fludrocortisone (FLORINEF) 0.1 mg tablet Take 1 Tab by mouth daily. 90 Tab 3    aspirin 81 mg chewable tablet Take 1 Tab by mouth daily. 100 Tab 3    atorvastatin (LIPITOR) 80 mg tablet Take 1 Tab by mouth daily. 90 Tab 3    CULTURELLE PROBIOTICS 10 billion cell -200 mg cpSP Take 1 Cap by mouth two (2) times a day. 100 Cap 5    trospium (SANCTURA) 20 mg tablet TAKE 1 TABLET BY MOUTH TWICE A DAY. 3    flash glucose sensor (FREESTYLE EMI 14 DAY SENSOR) kit Use to check blood sugars. Change site every 14 days. 2 Kit 11    lidocaine (ZTLIDO) 1.8 % ptmd 1 Patch by Apply Externally route daily.  On 12 hours then off fo 12 hours 20 Patch 1  loperamide HCl (IMODIUM PO) Take  by mouth.  clotrimazole-betamethasone (LOTRISONE) topical cream Apply  to affected area two (2) times a day.  naproxen (NAPROSYN) 500 mg tablet Take 500 mg by mouth two (2) times daily (with meals).  OTHER Indications: accu-chek three times a day      PRODIGY NO CODING strip USE TO TEST BLOOD SUGAR 3 TIMES A  Strip 5    cranberry 500 mg capsule Take 1 Cap by mouth daily. 100 Cap 3    ESTRACE 0.01 % (0.1 mg/gram) vaginal cream   3    glucose blood VI test strips (PRODIGY NO CODING) strip Patient test glucose 3 times daily. ICD E11.9 150 Strip 1        Review of Systems    Pertinent items are noted in HPI. Objective:     Visit Vitals  /84 (BP 1 Location: Left arm, BP Patient Position: Sitting)   Pulse 61   Temp 97.5 °F (36.4 °C) (Oral)   Resp 16   Ht 5' 3\" (1.6 m)   Wt 175 lb (79.4 kg)   SpO2 96%   BMI 31.00 kg/m²     General appearance: alert, cooperative, no distress, appears older than stated age  Neck: supple, symmetrical, trachea midline, no adenopathy, thyroid: not enlarged, symmetric, no tenderness/mass/nodules, no carotid bruit and no JVD  Lungs: clear to auscultation bilaterally  Heart: regular rate and rhythm, S1, S2 normal, no murmur, click, rub or gallop  Extremities: extremities normal, atraumatic, no cyanosis or edema. Has a tracking device strapped to her left ankle    Assessment/Plan:     1. Controlled type 2 diabetes mellitus without complication, with long-term current use of insulin ([facility] [address]  -patient has appointment with Dr. Leticia Vaca next week. Will draw hemoglobin A1c along with other labs needed today as it is difficult for patient to get labs done. - CBC WITH AUTOMATED DIFF  - METABOLIC PANEL, COMPREHENSIVE  - HEMOGLOBIN A1C WITH EAG    2. Benign hypertension  -I evaluated and recommended to continue current doses of medications.   - TSH 3RD GENERATION    3.  Dementia without behavioral disturbance, unspecified dementia type  -spoke with patient's Daughter, Tray Ordaz, via telephone after patient's visit. She is likely not sleeping at night because she is sleeping so much during the day. I encouraged her to talk with [place] to see if they can keep her engaged during the day, such that she can sleep at night. Tray Ordaz will talk with [place]. 4. Encounter for long-term (current) use of medications    - CBC WITH AUTOMATED DIFF  - METABOLIC PANEL, COMPREHENSIVE  - TSH 3RD GENERATION  - HEMOGLOBIN A1C WITH EAG    5. Hypothyroidism, adult  -check TSH today as well. Orders Placed This Encounter    CBC WITH AUTOMATED DIFF    METABOLIC PANEL, COMPREHENSIVE    TSH 3RD GENERATION    HEMOGLOBIN A1C WITH EAG    CKD REPORT    DIABETES PATIENT EDUCATION         Follow-up Disposition:     Follow up 4 months      Return if symptoms worsen or fail to improve. Advised patient to call back or return to office if symptoms worsen/change/persist.     Discussed expected course/resolution/complications of diagnosis in detail with patient. Medication risks/benefits/costs/interactions/alternatives discussed with patient. Patient was given an after visit summary which includes diagnoses, current medications, & vitals. Patient expressed understanding with the diagnosis and plan.

## 2019-01-31 NOTE — PROGRESS NOTES
Identified pt with two pt identifiers(name and ). Reviewed record in preparation for visit and have obtained necessary documentation. Chief Complaint   Patient presents with    Hypertension     4mo f/u    Sleep Problem     pt having difficulty staying asleep at night    Dementia    Medication Refill     Fax all Rx's to [facility]. Fax # [phone]        Health Maintenance Due   Topic    Shingrix Vaccine Age 50> (1 of 2)    MICROALBUMIN Q1     HEMOGLOBIN A1C Q6M        Coordination of Care Questionnaire:  :   1) Have you been to an emergency room, urgent care, or hospitalized since your last visit?   no   If yes, where when, and reason for visit? 2. Have seen or consulted any other health care provider since your last visit? Yes  -1/10/19: Lamont Jiang NP (urologist)  - Dr. Maximilian Yañez (endo)    3) Do you have an Advanced Directive/ Living Will in place? YES  If yes, do we have a copy on file YES  If no, would you like information NO    Patient is accompanied by nursing attendant I have received verbal consent from Matthew Higgins to discuss any/all medical information while they are present in the room.

## 2019-01-31 NOTE — TELEPHONE ENCOUNTER
Discussed with patient's daughter regarding her concerns about her mother's inability to sleep at night and wanders. She naps quite a bit during the day. She does participate some in the activities available, but not on a schedule. Concerned that sleep aids will worsen her dementia and also increase grogginess during the day. Recommended patient be put on regular schedule of activity such that she does have time to rest, but not sleep so much during the day and this will help her reset her evening sleep. Patient states understanding and agrees with plan.

## 2019-01-31 NOTE — Clinical Note
Hi,  This patient has an appointment with you on 2/7/19. Her aide, Anna Pastor ,mentioned it was difficult for patient to get labs, so I added hemoglobin A1c and TSH to labs drawn today. They should be resulted by the time she sees you. Thanks!

## 2019-02-01 LAB
ALBUMIN SERPL-MCNC: 3.9 G/DL (ref 3.5–4.8)
ALBUMIN/GLOB SERPL: 1.5 {RATIO} (ref 1.2–2.2)
ALP SERPL-CCNC: 91 IU/L (ref 39–117)
ALT SERPL-CCNC: 10 IU/L (ref 0–32)
AST SERPL-CCNC: 12 IU/L (ref 0–40)
BASOPHILS # BLD AUTO: 0.1 X10E3/UL (ref 0–0.2)
BASOPHILS NFR BLD AUTO: 1 %
BILIRUB SERPL-MCNC: 0.3 MG/DL (ref 0–1.2)
BUN SERPL-MCNC: 26 MG/DL (ref 8–27)
BUN/CREAT SERPL: 20 (ref 12–28)
CALCIUM SERPL-MCNC: 9.3 MG/DL (ref 8.7–10.3)
CHLORIDE SERPL-SCNC: 108 MMOL/L (ref 96–106)
CO2 SERPL-SCNC: 20 MMOL/L (ref 20–29)
CREAT SERPL-MCNC: 1.28 MG/DL (ref 0.57–1)
EOSINOPHIL # BLD AUTO: 0.2 X10E3/UL (ref 0–0.4)
EOSINOPHIL NFR BLD AUTO: 1 %
ERYTHROCYTE [DISTWIDTH] IN BLOOD BY AUTOMATED COUNT: 14.6 % (ref 12.3–15.4)
EST. AVERAGE GLUCOSE BLD GHB EST-MCNC: 235 MG/DL
GLOBULIN SER CALC-MCNC: 2.6 G/DL (ref 1.5–4.5)
GLUCOSE SERPL-MCNC: 127 MG/DL (ref 65–99)
HBA1C MFR BLD: 9.8 % (ref 4.8–5.6)
HCT VFR BLD AUTO: 36.6 % (ref 34–46.6)
HGB BLD-MCNC: 11.6 G/DL (ref 11.1–15.9)
IMM GRANULOCYTES # BLD AUTO: 0 X10E3/UL (ref 0–0.1)
IMM GRANULOCYTES NFR BLD AUTO: 0 %
INTERPRETATION: NORMAL
LYMPHOCYTES # BLD AUTO: 2.5 X10E3/UL (ref 0.7–3.1)
LYMPHOCYTES NFR BLD AUTO: 19 %
Lab: NORMAL
MCH RBC QN AUTO: 27 PG (ref 26.6–33)
MCHC RBC AUTO-ENTMCNC: 31.7 G/DL (ref 31.5–35.7)
MCV RBC AUTO: 85 FL (ref 79–97)
MONOCYTES # BLD AUTO: 0.5 X10E3/UL (ref 0.1–0.9)
MONOCYTES NFR BLD AUTO: 4 %
NEUTROPHILS # BLD AUTO: 9.6 X10E3/UL (ref 1.4–7)
NEUTROPHILS NFR BLD AUTO: 75 %
PLATELET # BLD AUTO: 346 X10E3/UL (ref 150–379)
POTASSIUM SERPL-SCNC: 4.8 MMOL/L (ref 3.5–5.2)
PROT SERPL-MCNC: 6.5 G/DL (ref 6–8.5)
RBC # BLD AUTO: 4.29 X10E6/UL (ref 3.77–5.28)
SODIUM SERPL-SCNC: 146 MMOL/L (ref 134–144)
TSH SERPL DL<=0.005 MIU/L-ACNC: 0.13 UIU/ML (ref 0.45–4.5)
WBC # BLD AUTO: 12.9 X10E3/UL (ref 3.4–10.8)

## 2019-02-07 ENCOUNTER — OFFICE VISIT (OUTPATIENT)
Dept: ENDOCRINOLOGY | Age: 79
End: 2019-02-07

## 2019-02-07 ENCOUNTER — DOCUMENTATION ONLY (OUTPATIENT)
Dept: INTERNAL MEDICINE CLINIC | Age: 79
End: 2019-02-07

## 2019-02-07 VITALS
HEART RATE: 69 BPM | SYSTOLIC BLOOD PRESSURE: 103 MMHG | HEIGHT: 63 IN | DIASTOLIC BLOOD PRESSURE: 63 MMHG | WEIGHT: 169.6 LBS | RESPIRATION RATE: 16 BRPM | OXYGEN SATURATION: 100 % | BODY MASS INDEX: 30.05 KG/M2

## 2019-02-07 DIAGNOSIS — E03.4 HYPOTHYROIDISM DUE TO ACQUIRED ATROPHY OF THYROID: ICD-10-CM

## 2019-02-07 DIAGNOSIS — Z79.4 TYPE 2 DIABETES MELLITUS WITH STAGE 3 CHRONIC KIDNEY DISEASE, WITH LONG-TERM CURRENT USE OF INSULIN (HCC): Primary | ICD-10-CM

## 2019-02-07 DIAGNOSIS — E11.22 TYPE 2 DIABETES MELLITUS WITH STAGE 3 CHRONIC KIDNEY DISEASE, WITH LONG-TERM CURRENT USE OF INSULIN (HCC): Primary | ICD-10-CM

## 2019-02-07 DIAGNOSIS — N18.30 TYPE 2 DIABETES MELLITUS WITH STAGE 3 CHRONIC KIDNEY DISEASE, WITH LONG-TERM CURRENT USE OF INSULIN (HCC): Primary | ICD-10-CM

## 2019-02-07 DIAGNOSIS — E27.40 ADRENAL INSUFFICIENCY (HCC): ICD-10-CM

## 2019-02-07 RX ORDER — INSULIN DEGLUDEC 100 U/ML
30 INJECTION, SOLUTION SUBCUTANEOUS DAILY
Qty: 15 ML | Refills: 3 | Status: SHIPPED | OUTPATIENT
Start: 2019-02-07 | End: 2019-05-16 | Stop reason: SDUPTHER

## 2019-02-07 RX ORDER — INSULIN LISPRO 100 [IU]/ML
INJECTION, SOLUTION INTRAVENOUS; SUBCUTANEOUS
Qty: 5 PEN | Refills: 3 | Status: SHIPPED | OUTPATIENT
Start: 2019-02-07 | End: 2019-02-18 | Stop reason: SDUPTHER

## 2019-02-07 NOTE — PATIENT INSTRUCTIONS
Diabetes Instructions:  - continue Victoza 1.8mg daily  - reduce Tresiba to 30 units daily  - take Humalog 10 units with each meal    - hold if pre-meal blood sugar is below 100   - add more according to scale below if blood sugar is over 200   200-249: 2 units   250-299: 4 units    300-349: 6 units   350-399: 8 units   Over 400: 10 units and notify me   - continue metformin XR 500mg twice a day    Check blood sugar at least FOUR times a day: before each meal and at bedtime    Feel free to call my office at [phone] if you have any questions. Please also call me if she has a blood sugar below 70. If her formulary does not cover Humalog, we will use Novolog instead (equivalent dosing).

## 2019-02-07 NOTE — PROGRESS NOTES
Pt care call while she was at her diabetic dr 's appt and wants someone to see her  For a possible uti but we did not have any appt and her care taker says she will take her at an urgent  care

## 2019-02-07 NOTE — PROGRESS NOTES
Endocrinology Visit    Chief Complaint: Type 2 diabetes, adrenal insufficiency, hypothyroidism    History of Present Illness: Keith Farooq is a 66 y.o. female who returns for f/u of adrenal insufficiency, hypothyroidism, and type 2 diabetes mellitus. Records from her previous endocrinologist Dr Eloina Sweet indicate pt had pituitary Cushings - treatment of which involved XRT (presumably to her pituitary) and BL adrenalectomy. She subsequently developed primary AI and is on hydrocortisone and fludrocortisone replacement. I saw her in initial consultation in December 2016 at which time I made several adjustments to her medication regimen as detailed by problem below. In May, we decided to try switching Novolog to Victoza + metformin to help improve her blood sugar control (A1c was 9.6%) and weight. She lost 16 lbs since and A1c had decreased to 8.0% in August. However, since her living situation changed (moved to [facility]), her blood sugar control has worsened. I resumed mealtime insulin at her last visit, since her medications are being administered for her, and the staff is checking her blood sugar 3 times/day. Most recent A1c was 9.8%. Current glycemic medication regimen is Victoza 1.8mg daily, metformin XR 500mg BID, Humalog 8 units TIDcc, and Tresiba 40 units Qam.   Home blood glucose monitoring frequency: 2-3 times/day  (see recorded values on log - 1 week of readings). Glucose range at home: 67 (fasting this morning, not recorded) to 467. Log shows lows are usually in the morning and she typically trends up as the day progresses. Known complications include CKD and neuropathy. Last eye exam was in June - no retinopathy. She does exercise, walks every day. Her food is provided at her [facility]. She does not necessarily try to restrict dietary carbohydrate intake - but today cannot seem to remember what she's been eating, could not perform 24h recall. She snacks between meals sometimes.  Likes juice.    She also has a history of hypothyroidism and is currently on generic levothyroxine 100 mcg daily. I increased it from 88 to 100 mcg daily at her last visit, however previously I had reduced gradually from 125 to 112 to 100 to 88. 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 She takes her medication first thing in the morning on an empty stomach and waits at least 30 minutes before eating or taking her other meds. She voices no complaints today. Denies palpitations, tremors, changes in bowel habits or energy level. Regarding her AI, she currently takes HC 15mg Qam, 5mg Qpm. I reduced her morning dose from 20mg to 15mg. She is also taking fludrocortisone 0.1 mg daily.      Review of Systems as above, otherwise a 7 pt review is negative    Problem List:  Patient Active Problem List   Diagnosis Code    TIA (transient ischemic attack) G45.9    Essential hypertension, benign I10    Recurrent UTI N39.0    RLS (restless legs syndrome) G25.81    Esophageal reflux K21.9    Cushing disease ([facility] [address] E24.0    Depression F32.9    Elevated cholesterol E78.00    Vitamin D deficiency E55.9    Adrenal insufficiency (HCC) E27.40    Hypothyroidism E03.9    Intracranial vascular stenosis I67.9    Meningioma ([facility] [address] D32.9    Cerebral infarction due to stenosis of left posterior cerebral artery (HCC) L27.541    Unstable angina (HCC) I20.0    Type 2 diabetes mellitus with stage 3 chronic kidney disease, with long-term current use of insulin (HCC) E11.22, N18.3, Z79.4    Dementia F03.90    History of mammography, screening Z92.89    Colon cancer screening Z12.11       Past Medical History:    Past Medical History:   Diagnosis Date    Arthritis osteoporosis    Cataract     Diabetes ([facility] [address]     Endocrine disease 1972    adrenal insufficiency - bilat adrenalectomy    GERD (gastroesophageal reflux disease)     H/O Cushing disease     Hypertension     Other ill-defined conditions(799.89)     elizabeth's disease    Stroke ([facility] [address] 2016    TIA 2011 & L occipial stroke 4/2016    Thyroid disease hypothyroid    UTI (lower urinary tract infection)        Past Surgical History:  Past Surgical History:   Procedure Laterality Date    ENDOCRINE SURGERY PROC UNLISTED Bilateral 1972    bilat adrenalectomy in 1972    HX APPENDECTOMY      HX CATARACT REMOVAL Left     ON the left    HX CHOLECYSTECTOMY      HX CHOLECYSTECTOMY  2000    HX HYSTERECTOMY      HX TONSILLECTOMY         Social History:  Social History     Socioeconomic History    Marital status:      Spouse name: Not on file    Number of children: Not on file    Years of education: Not on file    Highest education level: Not on file   Social Needs    Financial resource strain: Not on file    Food insecurity - worry: Not on file    Food insecurity - inability: Not on file   [employer] needs - medical: Not on file   [employer] needs - non-medical: Not on file   Occupational History    Not on file   Tobacco Use    Smoking status: Never Smoker    Smokeless tobacco: Never Used   Substance and Sexual Activity    Alcohol use: No    Drug use: No    Sexual activity: Not Currently   Other Topics Concern    Not on file   Social History Narrative    Not on file       Family History:  Family History   Problem Relation Age of Onset    Cancer Mother     Diabetes Mother     Stroke Mother     Psychiatric Disorder Mother     Dementia Mother     Headache Mother     Heart Disease Father     Psychiatric Disorder Father     Stroke Father     Asthma Sister     Diabetes Sister     Asthma Brother     Heart Disease Brother        Medications:     Current Outpatient Medications:     levothyroxine (SYNTHROID) 100 mcg tablet, Take 1 Tab by mouth Daily (before breakfast). , Disp: 90 Tab, Rfl: 1    VICTOZA 3-EDGARDO 0.6 mg/0.1 mL (18 mg/3 mL) pnij, INJECT 1.8ML SUBCUTANEOUSLY ONCE DAILY, Disp: 9 mL, Rfl: 0    lidocaine (ZTLIDO) 1.8 % ptmd, 1 Patch by Apply Externally route daily. On 12 hours then off fo 12 hours, Disp: 20 Patch, Rfl: 1    traMADol (ULTRAM) 50 mg tablet, Take 1 Tab by mouth every six (6) hours as needed for Pain. Max Daily Amount: 200 mg., Disp: 20 Tab, Rfl: 0    amLODIPine (NORVASC) 5 mg tablet, TAKE ONE TABLET BY MOUTH EVERY DAY, Disp: 30 Tab, Rfl: 4    lisinopril (PRINIVIL, ZESTRIL) 20 mg tablet, TAKE ONE TABLET BY MOUTH EVERY DAY FOR HIGH BLOOD PRESSURE, Disp: 16 Tab, Rfl: 4    escitalopram oxalate (LEXAPRO) 10 mg tablet, Take 1 Tab by mouth daily. Indications: major depressive disorder, Disp: 30 Tab, Rfl: 3    insulin lispro (HUMALOG KWIKPEN INSULIN) 100 unit/mL kwikpen, Inject 8 units TIDcc, Disp: 5 Pen, Rfl: 3    insulin degludec (TRESIBA FLEXTOUCH U-100) 100 unit/mL (3 mL) inpn, 40 Units by SubCUTAneous route daily. , Disp: 15 mL, Rfl: 3    clotrimazole-betamethasone (LOTRISONE) topical cream, Apply  to affected area two (2) times a day., Disp: , Rfl:     OTHER, Indications: accu-chek three times a day, Disp: , Rfl:     BD ULTRA-FINE MINI PEN NEEDLE 31 gauge x 3/16\" ndle, USE TO INJECT TRESIBA AND VICTOZA AS DIRECTED, Disp: 30 Pen Needle, Rfl: 3    PRODIGY NO CODING strip, USE TO TEST BLOOD SUGAR 3 TIMES A DAY, Disp: 100 Strip, Rfl: 5    furosemide (LASIX) 40 mg tablet, Take 1 Tab by mouth daily. , Disp: 90 Tab, Rfl: 3    metFORMIN ER (GLUCOPHAGE XR) 500 mg tablet, Take 1 Tab by mouth two (2) times daily (with meals). , Disp: 180 Tab, Rfl: 3    hydrocortisone (CORTEF) 5 mg tablet, TAKE 3 TABLETS IN THE MORNING AND TAKE 1 TABLET IN THE EVENING, Disp: 120 Tab, Rfl: 10    fludrocortisone (FLORINEF) 0.1 mg tablet, Take 1 Tab by mouth daily. , Disp: 90 Tab, Rfl: 3    aspirin 81 mg chewable tablet, Take 1 Tab by mouth daily. , Disp: 100 Tab, Rfl: 3    atorvastatin (LIPITOR) 80 mg tablet, Take 1 Tab by mouth daily. , Disp: 90 Tab, Rfl: 3   trospium (SANCTURA) 20 mg tablet, TAKE 1 TABLET BY MOUTH TWICE A DAY., Disp: , Rfl: 3    ESTRACE 0.01 % (0.1 mg/gram) vaginal cream, , Disp: , Rfl: 3    glucose blood VI test strips (PRODIGY NO CODING) strip, Patient test glucose 3 times daily. ICD E11.9, Disp: 150 Strip, Rfl: 1    flash glucose sensor (FREEST[employer] EMI 14 DAY SENSOR) kit, Use to check blood sugars. Change site every 14 days. , Disp: 2 Kit, Rfl: 11    flash glucose scanning reader (FREESTYLE EMI 14 DAY READER) [facility], Use to check blood sugars. Change site every 14 days. , Disp: 2 Each, Rfl: 0    flash glucose sensor (FREESTYLE EMI 10 DAY SENSOR) kit, Use to check blood sugars 4 times a day E11.65, Disp: 3 Kit, Rfl: 11    flash glucose scanning reader (FREESTYLE EMI 10 DAY READER) misc, Use to check blood sugar 4 times a day E11.65, Disp: 1 Each, Rfl: 0    loperamide HCl (IMODIUM PO), Take  by mouth., Disp: , Rfl:     naproxen (NAPROSYN) 500 mg tablet, Take 500 mg by mouth two (2) times daily (with meals). , Disp: , Rfl:     cranberry 500 mg capsule, Take 1 Cap by mouth daily. , Disp: 100 Cap, Rfl: 3    CULTURE[employer]LE PROBIOTICS 10 billion cell -200 mg cpSP, Take 1 Cap by mouth two (2) times a day., Disp: 100 Cap, Rfl: 5    Allergies:   Allergies   Allergen Reactions    Amoxicillin Unknown (comments)    Erythromycin Rash and Unknown (comments)     fever       Physical Examination:  Visit Vitals  /63   Pulse 69   Resp 16   Ht 5' 3\" (1.6 m)   Wt 169 lb 9.6 oz (76.9 kg)   SpO2 100%   BMI 30.04 kg/m²      Gen: elderly female in no acute distress, more drowsy today but responds to questions appropriately  HEENT: mucous membranes moist  Thyroid: no enlargement or nodules noted  CAD: normal rate, regular rhythm  PULM: unlabored respirations  EXT: no clubbing, cyanosis or edema  Neuro: grossly non focal, reflexes are brisk but no tremor of outstretched hands  Psych: pleasant, fair insight into medical hx  Skin: warm, dry    Clinical Data Review:  Lab Results   Component Value Date/Time    Hemoglobin A1c 9.8 (H) 01/31/2019 12:24 PM     Lab Results   Component Value Date/Time    Sodium 146 (H) 01/31/2019 12:24 PM    Potassium 4.8 01/31/2019 12:24 PM    Chloride 108 (H) 01/31/2019 12:24 PM    CO2 20 01/31/2019 12:24 PM    Anion gap 9 10/31/2018 12:24 AM    Glucose 127 (H) 01/31/2019 12:24 PM    BUN 26 01/31/2019 12:24 PM    Creatinine 1.28 (H) 01/31/2019 12:24 PM    BUN/Creatinine ratio 20 01/31/2019 12:24 PM    GFR est AA 46 (L) 01/31/2019 12:24 PM    GFR est non-AA 40 (L) 01/31/2019 12:24 PM    Calcium 9.3 01/31/2019 12:24 PM     Lab Results   Component Value Date/Time    Microalb/Creat ratio (ug/mg creat.) 88.5 (H) 12/05/2017 09:36 AM     Lab Results   Component Value Date/Time    Cholesterol, total 100 03/27/2018 10:25 AM    HDL Cholesterol 52 03/27/2018 10:25 AM    LDL, calculated 33 03/27/2018 10:25 AM    VLDL, calculated 15 03/27/2018 10:25 AM    Triglyceride 74 03/27/2018 10:25 AM    CHOL/HDL Ratio 6.0 (H) 10/13/2016 12:13 PM     Lab Results   Component Value Date/Time    Hemoglobin A1c 9.8 (H) 01/31/2019 12:24 PM     Lab Results   Component Value Date/Time    TSH 0.130 (L) 01/31/2019 12:24 PM    Triiodothyronine (T3), free 1.6 (L) 07/26/2018 11:05 AM    T4, Free 1.07 07/26/2018 11:05 AM    T4, Total 10.1 04/23/2011 04:07 AM      Assessment and Plan:  Keith Farooq is a 66 y.o. female here for type 2 diabetes, previously suboptimally controlled on multiple daily insulin injection regimen. A1c improved after switching mealtime insulin to GLP1a (9.6 --> 8.0%) however she has had hyperglycemia intermittently in the past few months presumably from high carbohydrate food/beverage choices. Based on home glucose readings, will decrease her basal insulin and increase mealtime as below. Add correction scale given her trend toward increasing hyperglycemia during the day.      1. Type 2 diabetes mellitus with stage 3 chronic kidney disease, with long-term current use of insulin (HCC)     Glycemic Medication Changes:  - reduce Tresiba to 30 units daily  - increase Humalog to 10 units with each meal    - hold if pre-meal blood sugar is below 100              - add correction 2 units for each 50>200  - continue Victoza 1.8mg daily  - continue metformin XR 500mg twice a day  - check blood sugars at minimum 4x/d: ACTID and Hs    DM Health Maintenance: pertinent items updated in HM tab  A1c: update with POC at next visit  Cv/Lipids: on atorvastatin, lipids UTD  BP/Renal: BP at goal, on ACEi, microalbumin UTD and elevated  Podiatry: no active issues, encouraged well-fitting footwear and daily inspection  Neuro: + neuropathy, foot exam UTD  Ophtho: yearly eye exam recommended  Diet and exercise: discussed healthy eating and exercise recommendations, particularly reduction in dietary carbohydrates      2. Adrenal insufficiency ([address]): primary s/p BL adrenalectomy. Continue HC 15mg Qam, 5mg Qpm. Recent sodium/potassium levels were stable - continue fludrocortisone. Repeat BMP at next visit. 3. Acquired hypothyroidism: euthyroid on exam after recent LT4 dose adjustment (increase from 88 to 100). Check T3/T4 to confirm this today. I am following these levels rather than TSH due to  central hypothyroidism. I spent 25 minutes with the patient today and > 50% of the time was spent counseling the patient about thyroid and adrenal medication management, also diabetes management including medication options and dietary modification. Patient verbalized an understanding and will return to clinic in 3 months. Thank you for the opportunity to participate in this patient's care.     Debora An MD  [facility]  [facility]

## 2019-02-08 LAB
T3FREE SERPL-MCNC: 1.7 PG/ML (ref 2–4.4)
T4 FREE SERPL-MCNC: 1.2 NG/DL (ref 0.82–1.77)

## 2019-02-08 RX ORDER — LEVOTHYROXINE SODIUM 112 UG/1
112 TABLET ORAL
Qty: 30 TAB | Refills: 3 | Status: SHIPPED | OUTPATIENT
Start: 2019-02-08 | End: 2019-05-16 | Stop reason: SDUPTHER

## 2019-02-13 ENCOUNTER — TELEPHONE (OUTPATIENT)
Dept: ENDOCRINOLOGY | Age: 79
End: 2019-02-13

## 2019-02-13 NOTE — TELEPHONE ENCOUNTER
Marietta from [address] called and stated that pt's bs was 512 this a.m and received 10 units of the Humalog 30 mins ago. Pt is not on any new medications, and is not currently sick. According to Daniel Benites, te pt's  family does bring in snacks and regular sodas for the pt often. Please advise      NOEMI,BS readings was requested as well.   Fax number [phone]

## 2019-02-14 ENCOUNTER — PATIENT MESSAGE (OUTPATIENT)
Dept: ENDOCRINOLOGY | Age: 79
End: 2019-02-14

## 2019-02-15 NOTE — TELEPHONE ENCOUNTER
The staff stated that the family is bringing in the snacks suck as cookies, chips and sodas, but the family stated that the snacks they are bringing in are sugar free and that the pt is getting the snacks from the snack bowl that is left in the castle for the patients to eat at anytime.

## 2019-02-15 NOTE — TELEPHONE ENCOUNTER
I would not change her basal insulin dose based on this one occurrence because she recently had early morning hypoglycemia. She probably ate something after dinner without insulin coverage. For now, continue current insulin doses including correction scale for hyperglycemia. Avoid sodas, sugary beverages, and candy.

## 2019-02-18 RX ORDER — INSULIN LISPRO 100 [IU]/ML
INJECTION, SOLUTION INTRAVENOUS; SUBCUTANEOUS
Qty: 5 PEN | Refills: 3 | Status: SHIPPED | OUTPATIENT
Start: 2019-02-18 | End: 2019-05-16 | Stop reason: SDUPTHER

## 2019-02-18 NOTE — TELEPHONE ENCOUNTER
From: Nichole Zeng  To: Cherylene Abler, MD  Sent: 2/14/2019 4:53 PM EST  Subject: Prescription Question    My prescription coverage sent the attached letter. Can you review it and let me know what I should do? Is Humalog a viable alternative? If not, can you provide them with the requested info to consider an exception? Can you also provide a copy of the completed [address] that was completed for the [employer]? I'd like a copy for my records? Thanks.

## 2019-02-20 ENCOUNTER — TELEPHONE (OUTPATIENT)
Dept: ENDOCRINOLOGY | Age: 79
End: 2019-02-20

## 2019-02-20 ENCOUNTER — TELEPHONE (OUTPATIENT)
Dept: INTERNAL MEDICINE CLINIC | Age: 79
End: 2019-02-20

## 2019-02-20 NOTE — TELEPHONE ENCOUNTER
2/20/2019 returned call to Elvia Lagunas. I was placed into her voice mail. Need to clarify what fax is she referring to. All faxes that required a signature have been cleared for today. If there was something that needed my signature, then I did not receive it. I have sent back an order that required a signature that did not come from my office. This was faxed back to [place] earlier this week. Asked Hetal to call back with any further concerns.

## 2019-02-20 NOTE — TELEPHONE ENCOUNTER
----- Message from Ofelia Pastor sent at 2/20/2019  9:27 AM EST -----  Regarding: Dr. Frank Carney, with [facility] is requesting a call back from the office to confirm that a fax has been received for the pt.   Phone: [phone]

## 2019-02-21 ENCOUNTER — TELEPHONE (OUTPATIENT)
Dept: INTERNAL MEDICINE CLINIC | Age: 79
End: 2019-02-21

## 2019-02-21 NOTE — TELEPHONE ENCOUNTER
Received page from Tristin at [facility], Ms Halle Serra BG was 408. She notes that she received Tresiba 10 this evening, but Ms Crow notes that pt did not receive her dinner Humalog (pt did not eat dinner at the [facility] so she did not receive any Humalog). I instructed Ms Crow to give Ms Halle Serra 5 units of Humalog now and to repeat her BG in 2 hours. Ms Crow is to call if Ms Halle Serra' BG is less than 70 or greater than 400.

## 2019-02-21 NOTE — TELEPHONE ENCOUNTER
Pt caregiver Andrae He  Called she is on the release  Wanted Dr Jorge Morris to fax over order to [facility]  To give pt Imodium  AD ad needed for Diarrhea.   There fax number at [facility] is [phone]

## 2019-03-04 ENCOUNTER — DOCUMENTATION ONLY (OUTPATIENT)
Dept: ENDOCRINOLOGY | Age: 79
End: 2019-03-04

## 2019-03-04 NOTE — PROGRESS NOTES
Can you do an addendum on pt's 02/7/19 office visit that states you are requesting for pt to check her blood sugars 4 times a day? FYI, Medicare will on pay for testing supplies for insulin base pts that are checking their bs at least 4 times a day.

## 2019-03-06 RX ORDER — TROSPIUM CHLORIDE 20 MG/1
TABLET, FILM COATED ORAL
Qty: 60 TAB | Refills: 5 | Status: SHIPPED | OUTPATIENT
Start: 2019-03-06 | End: 2019-04-23

## 2019-03-06 NOTE — TELEPHONE ENCOUNTER
PCP: Elle Payton MD    Last appt: 1/31/2019  Future Appointments   Date Time Provider Luigi Farmer   5/16/2019 11:30 AM Holly Mejia MD RDE [facility] 5/23/2019 12:00 PM Elle Payton MD [address]       Requested Prescriptions     Pending Prescriptions Disp Refills    trospium (SANCTURA) 20 mg tablet  3

## 2019-03-07 RX ORDER — LOPERAMIDE HCL 2 MG
2 TABLET ORAL
Qty: 30 TAB | Refills: 3 | Status: SHIPPED | OUTPATIENT
Start: 2019-03-07 | End: 2019-03-07 | Stop reason: SDUPTHER

## 2019-03-07 RX ORDER — LOPERAMIDE HCL 2 MG
TABLET ORAL
Qty: 30 TAB | Refills: 3 | Status: SHIPPED | OUTPATIENT
Start: 2019-03-07 | End: 2019-01-01 | Stop reason: DRUGHIGH

## 2019-03-07 NOTE — TELEPHONE ENCOUNTER
Pt  Care giver call  Today stating that she had call about two weeks ago about faxing over an order to get imodium  For pt since she is always having diarrhea can some one  [facility]  Pt has move to the memory care unit since her last request and  Sheron Escalante does not have there # nor fax # you can call raphael back at [phone]

## 2019-03-09 ENCOUNTER — APPOINTMENT (OUTPATIENT)
Dept: GENERAL RADIOLOGY | Age: 79
End: 2019-03-09
Attending: EMERGENCY MEDICINE
Payer: MEDICARE

## 2019-03-09 ENCOUNTER — APPOINTMENT (OUTPATIENT)
Dept: CT IMAGING | Age: 79
End: 2019-03-09
Attending: EMERGENCY MEDICINE
Payer: MEDICARE

## 2019-03-09 ENCOUNTER — HOSPITAL ENCOUNTER (EMERGENCY)
Age: 79
Discharge: HOME | End: 2019-03-09
Attending: EMERGENCY MEDICINE
Payer: MEDICARE

## 2019-03-09 VITALS
WEIGHT: 169 LBS | DIASTOLIC BLOOD PRESSURE: 81 MMHG | BODY MASS INDEX: 29.95 KG/M2 | HEART RATE: 58 BPM | OXYGEN SATURATION: 99 % | SYSTOLIC BLOOD PRESSURE: 142 MMHG | TEMPERATURE: 97.5 F | RESPIRATION RATE: 16 BRPM | HEIGHT: 63 IN

## 2019-03-09 DIAGNOSIS — N30.01 ACUTE CYSTITIS WITH HEMATURIA: ICD-10-CM

## 2019-03-09 DIAGNOSIS — M54.5 LOW BACK PAIN, UNSPECIFIED BACK PAIN LATERALITY, UNSPECIFIED CHRONICITY, WITH SCIATICA PRESENCE UNSPECIFIED: ICD-10-CM

## 2019-03-09 DIAGNOSIS — R51.9 NONINTRACTABLE HEADACHE, UNSPECIFIED CHRONICITY PATTERN, UNSPECIFIED HEADACHE TYPE: Primary | ICD-10-CM

## 2019-03-09 LAB
ALBUMIN SERPL-MCNC: 3.2 G/DL (ref 3.5–5)
ALBUMIN/GLOB SERPL: 0.8 {RATIO} (ref 1.1–2.2)
ALP SERPL-CCNC: 85 U/L (ref 45–117)
ALT SERPL-CCNC: 16 U/L (ref 12–78)
ANION GAP SERPL CALC-SCNC: 8 MMOL/L (ref 5–15)
APPEARANCE UR: ABNORMAL
AST SERPL-CCNC: 15 U/L (ref 15–37)
BACTERIA URNS QL MICRO: ABNORMAL /HPF
BASOPHILS # BLD: 0.1 K/UL (ref 0–0.1)
BASOPHILS NFR BLD: 1 % (ref 0–1)
BILIRUB SERPL-MCNC: 0.3 MG/DL (ref 0.2–1)
BILIRUB UR QL: NEGATIVE
BUN SERPL-MCNC: 24 MG/DL (ref 6–20)
BUN/CREAT SERPL: 22 (ref 12–20)
CALCIUM SERPL-MCNC: 8.6 MG/DL (ref 8.5–10.1)
CHLORIDE SERPL-SCNC: 105 MMOL/L (ref 97–108)
CO2 SERPL-SCNC: 28 MMOL/L (ref 21–32)
COLOR UR: ABNORMAL
CREAT SERPL-MCNC: 1.11 MG/DL (ref 0.55–1.02)
DIFFERENTIAL METHOD BLD: ABNORMAL
EOSINOPHIL # BLD: 0.2 K/UL (ref 0–0.4)
EOSINOPHIL NFR BLD: 1 % (ref 0–7)
EPITH CASTS URNS QL MICRO: ABNORMAL /LPF
ERYTHROCYTE [DISTWIDTH] IN BLOOD BY AUTOMATED COUNT: 13.8 % (ref 11.5–14.5)
GLOBULIN SER CALC-MCNC: 3.8 G/DL (ref 2–4)
GLUCOSE BLD STRIP.AUTO-MCNC: 140 MG/DL (ref 65–100)
GLUCOSE SERPL-MCNC: 195 MG/DL (ref 65–100)
GLUCOSE UR STRIP.AUTO-MCNC: NEGATIVE MG/DL
HCT VFR BLD AUTO: 42.1 % (ref 35–47)
HGB BLD-MCNC: 13 G/DL (ref 11.5–16)
HGB UR QL STRIP: ABNORMAL
HYALINE CASTS URNS QL MICRO: ABNORMAL /LPF (ref 0–5)
IMM GRANULOCYTES # BLD AUTO: 0.1 K/UL (ref 0–0.04)
IMM GRANULOCYTES NFR BLD AUTO: 0 % (ref 0–0.5)
KETONES UR QL STRIP.AUTO: NEGATIVE MG/DL
LEUKOCYTE ESTERASE UR QL STRIP.AUTO: ABNORMAL
LIPASE SERPL-CCNC: 337 U/L (ref 73–393)
LYMPHOCYTES # BLD: 3.7 K/UL (ref 0.8–3.5)
LYMPHOCYTES NFR BLD: 24 % (ref 12–49)
MCH RBC QN AUTO: 26.6 PG (ref 26–34)
MCHC RBC AUTO-ENTMCNC: 30.9 G/DL (ref 30–36.5)
MCV RBC AUTO: 86.1 FL (ref 80–99)
MONOCYTES # BLD: 1.2 K/UL (ref 0–1)
MONOCYTES NFR BLD: 8 % (ref 5–13)
NEUTS SEG # BLD: 9.9 K/UL (ref 1.8–8)
NEUTS SEG NFR BLD: 66 % (ref 32–75)
NITRITE UR QL STRIP.AUTO: POSITIVE
NRBC # BLD: 0 K/UL (ref 0–0.01)
NRBC BLD-RTO: 0 PER 100 WBC
PH UR STRIP: 7 [PH] (ref 5–8)
PLATELET # BLD AUTO: 411 K/UL (ref 150–400)
PMV BLD AUTO: 9.8 FL (ref 8.9–12.9)
POTASSIUM SERPL-SCNC: 3.5 MMOL/L (ref 3.5–5.1)
PROT SERPL-MCNC: 7 G/DL (ref 6.4–8.2)
PROT UR STRIP-MCNC: NEGATIVE MG/DL
RBC # BLD AUTO: 4.89 M/UL (ref 3.8–5.2)
RBC #/AREA URNS HPF: ABNORMAL /HPF (ref 0–5)
SERVICE CMNT-IMP: ABNORMAL
SODIUM SERPL-SCNC: 141 MMOL/L (ref 136–145)
SP GR UR REFRACTOMETRY: 1.01 (ref 1–1.03)
TROPONIN I SERPL-MCNC: <0.05 NG/ML
UR CULT HOLD, URHOLD: NORMAL
UROBILINOGEN UR QL STRIP.AUTO: 0.2 EU/DL (ref 0.2–1)
WBC # BLD AUTO: 15.1 K/UL (ref 3.6–11)
WBC URNS QL MICRO: >100 /HPF (ref 0–4)

## 2019-03-09 PROCEDURE — 77030011943

## 2019-03-09 PROCEDURE — 87077 CULTURE AEROBIC IDENTIFY: CPT

## 2019-03-09 PROCEDURE — 74011250637 HC RX REV CODE- 250/637: Performed by: NURSE PRACTITIONER

## 2019-03-09 PROCEDURE — 87186 SC STD MICRODIL/AGAR DIL: CPT

## 2019-03-09 PROCEDURE — 84484 ASSAY OF TROPONIN QUANT: CPT

## 2019-03-09 PROCEDURE — 80053 COMPREHEN METABOLIC PANEL: CPT

## 2019-03-09 PROCEDURE — 81001 URINALYSIS AUTO W/SCOPE: CPT

## 2019-03-09 PROCEDURE — 83690 ASSAY OF LIPASE: CPT

## 2019-03-09 PROCEDURE — 85025 COMPLETE CBC W/AUTO DIFF WBC: CPT

## 2019-03-09 PROCEDURE — 36415 COLL VENOUS BLD VENIPUNCTURE: CPT

## 2019-03-09 PROCEDURE — 82962 GLUCOSE BLOOD TEST: CPT

## 2019-03-09 PROCEDURE — 70450 CT HEAD/BRAIN W/O DYE: CPT

## 2019-03-09 PROCEDURE — 87045 FECES CULTURE AEROBIC BACT: CPT

## 2019-03-09 PROCEDURE — 72100 X-RAY EXAM L-S SPINE 2/3 VWS: CPT

## 2019-03-09 PROCEDURE — 51701 INSERT BLADDER CATHETER: CPT

## 2019-03-09 PROCEDURE — 99285 EMERGENCY DEPT VISIT HI MDM: CPT

## 2019-03-09 PROCEDURE — 87086 URINE CULTURE/COLONY COUNT: CPT

## 2019-03-09 RX ORDER — CEPHALEXIN 500 MG/1
500 CAPSULE ORAL 4 TIMES DAILY
Qty: 28 CAP | Refills: 0 | Status: SHIPPED | OUTPATIENT
Start: 2019-03-09 | End: 2019-03-16

## 2019-03-09 RX ORDER — CEPHALEXIN 250 MG/1
500 CAPSULE ORAL
Status: COMPLETED | OUTPATIENT
Start: 2019-03-09 | End: 2019-03-09

## 2019-03-09 RX ADMIN — CEPHALEXIN 500 MG: 250 CAPSULE ORAL at 18:07

## 2019-03-09 NOTE — DISCHARGE INSTRUCTIONS
Patient Education     - you have a urinary tract infection, take the antibiotics to completion   - follow up with your primary care provider as soon as possible regarding your recent ER visit      Back Pain, Emergency or Urgent Symptoms: Care Instructions  Your Care Instructions    Many people have back pain at one time or another. In most cases, pain gets better with self-care that includes over-the-counter pain medicine, ice, heat, and exercises. Unless you have symptoms of a severe injury or heart attack, you may be able to give yourself a few days before you call a doctor. But some back problems are very serious. Do not ignore symptoms that need to be checked right away. Follow-up care is a key part of your treatment and safety. Be sure to make and go to all appointments, and call your doctor if you are having problems. It's also a good idea to know your test results and keep a list of the medicines you take. How can you care for yourself at home? · Sit or lie in positions that are most comfortable and that reduce your pain. Try one of these positions when you lie down:  ? Lie on your back with your knees bent and supported by large pillows. ? Lie on the floor with your legs on the seat of a sofa or chair. ? Lie on your side with your knees and hips bent and a pillow between your legs. ? Lie on your stomach if it does not make pain worse. · Do not sit up in bed, and avoid soft couches and twisted positions. Bed rest can help relieve pain at first, but it delays healing. Avoid bed rest after the first day. · Change positions every 30 minutes. If you must sit for long periods of time, take breaks from sitting. Get up and walk around, or lie flat. · Try using a heating pad on a low or medium setting, for 15 to 20 minutes every 2 or 3 hours. Try a warm shower in place of one session with the heating pad. You can also buy single-use heat wraps that last up to 8 hours.  You can also try ice or cold packs on your back for 10 to 20 minutes at a time, several times a day. (Put a thin cloth between the ice pack and your skin.) This reduces pain and makes it easier to be active and exercise. · Take pain medicines exactly as directed. ? If the doctor gave you a prescription medicine for pain, take it as prescribed. ? If you are not taking a prescription pain medicine, ask your doctor if you can take an over-the-counter medicine. When should you call for help? Call 911 anytime you think you may need emergency care. For example, call if:    · You are unable to move a leg at all.     · You have back pain with severe belly pain.     · You have symptoms of a heart attack. These may include:  ? Chest pain or pressure, or a strange feeling in the chest.  ? Sweating. ? Shortness of breath. ? Nausea or vomiting. ? Pain, pressure, or a strange feeling in the back, neck, jaw, or upper belly or in one or both shoulders or arms. ? Lightheadedness or sudden weakness. ? A fast or irregular heartbeat. After you call 911, the  may tell you to chew 1 adult-strength or 2 to 4 low-dose aspirin. Wait for an ambulance. Do not try to drive yourself.    Call your doctor now or seek immediate medical care if:    · You have new or worse symptoms in your arms, legs, chest, belly, or buttocks. Symptoms may include:  ? Numbness or tingling. ? Weakness. ? Pain.     · You lose bladder or bowel control.     · You have back pain and:  ? You have injured your back while lifting or doing some other activity. Call if the pain is severe, has not gone away after 1 or 2 days, and you cannot do your normal daily activities. ? You have had a back injury before that needed treatment. ? Your pain has lasted longer than 4 weeks. ? You have had weight loss you cannot explain. ? You have a fever. ? You are age 48 or older. ?  You have cancer now or have had it before.    Watch closely for changes in your health, and be sure to contact your doctor if you are not getting better as expected. Where can you learn more? Go to http://love-beth.info/. Enter N106 in the search box to learn more about \"Back Pain, Emergency or Urgent Symptoms: Care Instructions. \"  Current as of: September 23, 2018  Content Version: 11.9  © 5884-7398 [employer]. Care instructions adapted under license by [employer] (which disclaims liability or warranty for this information). If you have questions about a medical condition or this instruction, always ask your healthcare professional. [address] any warranty or liability for your use of this information.

## 2019-03-09 NOTE — ED PROVIDER NOTES
HPI Pt was sent in by the assisted living facility today after the pt c/o a persistent frontal headache and had low blood sugar. She is an insulin dependent diabetic who states that she \"sometimes forgets to take her insulin\". Denies fever, cold symptoms, neck pain, visual changes, focal weakness or rash. Denies any recent falls. Denies any  difficulty breathing, difficulty swallowing, SOB, chest pain or abdominal pain. Denies any nausea, vomiting or diarrhea. Pt. Reports that she has not eaten or had any pain medications today prior to arrival.   PMH is significant for dementia, DM, HTN, adrenal insufficiency, GERD, Cushing's and Canute's, CVA.   PMH, social history  And ROS are limited secondary to dementia    Past Medical History:   Diagnosis Date    Arthritis osteoporosis    Cataract     Diabetes [facility]     Endocrine disease 1972    adrenal insufficiency - bilat adrenalectomy    GERD (gastroesophageal reflux disease)     H/O Cushing disease     Hypertension     Other ill-defined conditions(799.89)     elizabeth's disease    Stroke ([facility] 2016    TIA 2011 & L occipial stroke 4/2016    Thyroid disease hypothyroid    UTI (lower urinary tract infection)        Past Surgical History:   Procedure Laterality Date    ENDOCRINE SURGERY PROC UNLISTED Bilateral 1972    bilat adrenalectomy in 1972    HX APPENDECTOMY      HX CATARACT REMOVAL Left     ON the left    HX CHOLECYSTECTOMY      HX CHOLECYSTECTOMY  2000    HX HYSTERECTOMY      HX TONSILLECTOMY           Family History:   Problem Relation Age of Onset   Trinity Razo Cancer Mother     Diabetes Mother     Stroke Mother     Psychiatric Disorder Mother     Dementia Mother     Headache Mother     Heart Disease Father     Psychiatric Disorder Father     Stroke Father     Asthma Sister     Diabetes Sister     Asthma Brother     Heart Disease Brother        Social History     Socioeconomic History    Marital status:      Spouse name: Not on file   Trinity Razo Number of children: Not on file    Years of education: Not on file    Highest education level: Not on file   Social Needs    Financial resource strain: Not on file    Food insecurity - worry: Not on file    Food insecurity - inability: Not on file    Transportation needs - medical: Not on file   Tunessence needs - non-medical: Not on file   Occupational History    Not on file   Tobacco Use    Smoking status: Never Smoker    Smokeless tobacco: Never Used   Substance and Sexual Activity    Alcohol use: No    Drug use: No    Sexual activity: Not Currently   Other Topics Concern    Not on file   Social History Narrative    Not on file         ALLERGIES: Amoxicillin and Erythromycin    Review of Systems   Constitutional: Negative for activity change, appetite change and fever. HENT: Negative for congestion and trouble swallowing. Respiratory: Negative for cough and shortness of breath. Cardiovascular: Negative for chest pain, palpitations and leg swelling. Gastrointestinal: Negative for abdominal pain, diarrhea, nausea and vomiting. Musculoskeletal: Positive for back pain. Negative for neck pain. Skin: Negative for rash. Neurological: Positive for headaches. All other systems reviewed and are negative. Vitals:    03/09/19 1419   BP: 132/73   Pulse: (!) 58   Resp: 16   Temp: 97.5 °F (36.4 °C)   SpO2: 99%   Weight: 76.7 kg (169 lb)   Height: 5' 3\" (1.6 m)            Physical Exam   Constitutional: She is oriented to person, place, and time. She appears well-developed and well-nourished. Eyes: Pupils are equal, round, and reactive to light. Neck: Normal range of motion. Neck supple. Cardiovascular: Normal rate and regular rhythm. Pulmonary/Chest: Effort normal and breath sounds normal.   Abdominal: Soft. Bowel sounds are normal.   Musculoskeletal:   Normal slow active ROM of upper and lower extremities   Neurological: She is alert and oriented to person, place, and time. Skin: Skin is warm and dry. Psychiatric: She has a normal mood and affect. Nursing note and vitals reviewed. MDM       Procedures    Pt was incontinent of stool; urine straight cathed sample sent. Discussed plan of care with Dr. Rickie Flores. Patient's results and plan of care have been reviewed with her and her granddaughter present. Patient and/or family have verbally conveyed their understanding and agreement of the patient's signs, symptoms, diagnosis, treatment and prognosis and additionally agree to follow up as recommended or return to the Emergency Room should her condition change prior to follow-up. Discharge instructions have also been provided to the patient with some educational information regarding her diagnosis as well a list of reasons why she would want to return to the ER prior to her follow-up appointment should her condition change. Franklin Hauser NP

## 2019-03-09 NOTE — ED TRIAGE NOTES
C/O headache and lower back pain at her assisted living facility. Staff checked her blood glucose and it was 26 and they called EMS. Blood sugar was not treated, EMS rechecked blood sugar and result was 106. No known falls or injury, PMH dementia.

## 2019-03-09 NOTE — ED NOTES
Discharge instructions given by provider. Discharged home with daughter via W/C in stable condiiton with paper scrub bottoms as patient wanted to throw her pj bottoms out after fecal incontinence.

## 2019-03-09 NOTE — ED NOTES
5:32 PM  Change of shift. Care of patient taken over from [facility], NP; H&P reviewed, bedside handoff complete. Awaiting UA results, anticipate discharge back to [facility] after results finalized. 5:41 PM   (+) UTI via UA, UCx ordered. Reviewed prior C/S from approximately 1 year ago- hx of E. Coli in urine- susceptible to cephalosporins- will order Keflex and prepare for discharge. Family aware of plan of care and discharge teaching. Discharge Note:    The patient is ready for discharge. The patient's signs, symptoms, diagnosis, and discharge instruction have been discussed and the patient has conveyed their understanding. The patient is to follow up as recommended or return to the ER should their symptoms worsen. Plan has been discussed and the patient is in agreement.     Dagoberto Hartley, CONCHA

## 2019-03-09 NOTE — ED TRIAGE NOTES
Patient reports she woke this morning, went to bathroom and felt okay. Sometime after that developed a headache in the back of her head that has gradually worsened through the day. Reports that she does not think she has eaten today.

## 2019-03-11 DIAGNOSIS — S22.010A TRAUMATIC COMPRESSION FRACTURE OF T1 THORACIC VERTEBRA, CLOSED, INITIAL ENCOUNTER (HCC): ICD-10-CM

## 2019-03-11 DIAGNOSIS — S22.080A COMPRESSION FRACTURE OF T12 VERTEBRA (HCC): ICD-10-CM

## 2019-03-11 LAB
BACTERIA SPEC CULT: ABNORMAL
CC UR VC: ABNORMAL
SERVICE CMNT-IMP: ABNORMAL

## 2019-03-11 NOTE — TELEPHONE ENCOUNTER
PCP: Natividad Domínguez MD    Last appt: 1/31/2019  Future Appointments   Date Time Provider Luigi Farmer   5/16/2019 11:30 AM Georg Nageotte, MD RDE [address] 5/23/2019 12:00 PM MD NICOLE Luciano       Requested Prescriptions     Pending Prescriptions Disp Refills    traMADol (ULTRAM) 50 mg tablet 20 Tab 0     Sig: Take 1 Tab by mouth every six (6) hours as needed for Pain. Max Daily Amount: 200 mg.

## 2019-03-12 LAB
BACTERIA SPEC CULT: NORMAL
C JEJUNI+C COLI AG STL QL: NEGATIVE
E COLI SXT1+2 STL IA: NEGATIVE
SERVICE CMNT-IMP: NORMAL

## 2019-03-13 RX ORDER — TRAMADOL HYDROCHLORIDE 50 MG/1
50 TABLET ORAL
Qty: 20 TAB | Refills: 0 | OUTPATIENT
Start: 2019-03-13 | End: 2019-04-12

## 2019-03-13 NOTE — TELEPHONE ENCOUNTER
Rx called into local pharmacy VM on file. Requested Prescriptions     Signed Prescriptions Disp Refills    traMADol (ULTRAM) 50 mg tablet 20 Tab 0     Sig: Take 1 Tab by mouth two (2) times daily as needed for Pain for up to 30 days. Max Daily Amount: 100 mg.      Authorizing Provider: Javed Wright

## 2019-03-13 NOTE — TELEPHONE ENCOUNTER
Orders Placed This Encounter    traMADol (ULTRAM) 50 mg tablet     Sig: Take 1 Tab by mouth two (2) times daily as needed for Pain for up to 30 days. Max Daily Amount: 100 mg.      Dispense:  20 Tab     Refill:  0       reviewed 3/13/2019

## 2019-03-27 ENCOUNTER — TELEPHONE (OUTPATIENT)
Dept: ENDOCRINOLOGY | Age: 79
End: 2019-03-27

## 2019-03-27 NOTE — TELEPHONE ENCOUNTER
3/27/2019  2:31 PM          Please advise on the [employer] message sent today 3/27/19.           Thanks

## 2019-04-15 RX ORDER — NAPROXEN 250 MG/1
250 TABLET ORAL
Qty: 60 TAB | Refills: 0 | Status: SHIPPED | OUTPATIENT
Start: 2019-04-15 | End: 2019-01-01

## 2019-04-15 RX ORDER — NAPROXEN 500 MG/1
TABLET ORAL
Qty: 60 TAB | Refills: 0 | OUTPATIENT
Start: 2019-04-15

## 2019-04-23 RX ORDER — GRANULES FOR ORAL 3 G/1
3 POWDER ORAL ONCE
COMMUNITY
End: 2019-01-01

## 2019-05-16 ENCOUNTER — OFFICE VISIT (OUTPATIENT)
Dept: ENDOCRINOLOGY | Age: 79
End: 2019-05-16

## 2019-05-16 ENCOUNTER — TELEPHONE (OUTPATIENT)
Dept: ENDOCRINOLOGY | Age: 79
End: 2019-05-16

## 2019-05-16 VITALS
BODY MASS INDEX: 32.26 KG/M2 | RESPIRATION RATE: 16 BRPM | SYSTOLIC BLOOD PRESSURE: 118 MMHG | WEIGHT: 164.3 LBS | OXYGEN SATURATION: 96 % | DIASTOLIC BLOOD PRESSURE: 69 MMHG | HEART RATE: 83 BPM | HEIGHT: 60 IN

## 2019-05-16 DIAGNOSIS — E27.40 ADRENAL INSUFFICIENCY (HCC): ICD-10-CM

## 2019-05-16 DIAGNOSIS — N18.30 TYPE 2 DIABETES MELLITUS WITH STAGE 3 CHRONIC KIDNEY DISEASE, WITH LONG-TERM CURRENT USE OF INSULIN (HCC): Primary | ICD-10-CM

## 2019-05-16 DIAGNOSIS — E03.4 HYPOTHYROIDISM DUE TO ACQUIRED ATROPHY OF THYROID: ICD-10-CM

## 2019-05-16 DIAGNOSIS — E11.22 TYPE 2 DIABETES MELLITUS WITH STAGE 3 CHRONIC KIDNEY DISEASE, WITH LONG-TERM CURRENT USE OF INSULIN (HCC): Primary | ICD-10-CM

## 2019-05-16 DIAGNOSIS — Z79.4 TYPE 2 DIABETES MELLITUS WITH STAGE 3 CHRONIC KIDNEY DISEASE, WITH LONG-TERM CURRENT USE OF INSULIN (HCC): Primary | ICD-10-CM

## 2019-05-16 LAB — HBA1C MFR BLD HPLC: 7 %

## 2019-05-16 RX ORDER — LIRAGLUTIDE 6 MG/ML
INJECTION SUBCUTANEOUS
Refills: 6 | COMMUNITY
Start: 2019-05-04 | End: 2019-05-16 | Stop reason: SDUPTHER

## 2019-05-16 RX ORDER — TRAMADOL HYDROCHLORIDE 50 MG/1
50 TABLET ORAL
COMMUNITY
End: 2019-01-01

## 2019-05-16 RX ORDER — MIRABEGRON 50 MG/1
TABLET, FILM COATED, EXTENDED RELEASE ORAL
Refills: 11 | COMMUNITY
Start: 2019-04-15 | End: 2019-01-01

## 2019-05-16 RX ORDER — INSULIN DEGLUDEC 100 U/ML
30 INJECTION, SOLUTION SUBCUTANEOUS DAILY
Qty: 15 ML | Refills: 3 | Status: SHIPPED | OUTPATIENT
Start: 2019-05-16 | End: 2019-01-01 | Stop reason: SDUPTHER

## 2019-05-16 RX ORDER — INSULIN LISPRO 100 [IU]/ML
INJECTION, SOLUTION INTRAVENOUS; SUBCUTANEOUS
Qty: 5 PEN | Refills: 3 | Status: SHIPPED | OUTPATIENT
Start: 2019-05-16 | End: 2019-01-01 | Stop reason: SDUPTHER

## 2019-05-16 RX ORDER — ATORVASTATIN CALCIUM 10 MG/1
TABLET, FILM COATED ORAL
Refills: 0 | COMMUNITY
Start: 2019-04-30 | End: 2019-05-20 | Stop reason: SDUPTHER

## 2019-05-16 RX ORDER — LIRAGLUTIDE 6 MG/ML
1.8 INJECTION SUBCUTANEOUS DAILY
Qty: 3 PEN | Refills: 11 | Status: SHIPPED | OUTPATIENT
Start: 2019-05-16 | End: 2019-01-01 | Stop reason: SDUPTHER

## 2019-05-16 RX ORDER — LEVOTHYROXINE SODIUM 112 UG/1
112 TABLET ORAL
Qty: 90 TAB | Refills: 3 | Status: SHIPPED | OUTPATIENT
Start: 2019-05-16 | End: 2019-05-23 | Stop reason: SDUPTHER

## 2019-05-16 NOTE — TELEPHONE ENCOUNTER
5/16/2019   12:24 PM    This is a former patient of Dr. Jamaal Winkler who was seen in the office today. Her caregiver would like to be seen by a physician in [place]. However, she can only get her to an appointment on a Thursday anytime after 11:00 pm. Can anyone fit her in 3-6 months from now?     Please Advise  Thanks

## 2019-05-16 NOTE — TELEPHONE ENCOUNTER
Returned pharmacy's call, but was on hold for close to 10 minutes. I called and again, but this time I left a message requesting for them to fax whatever it is that they're in need of to our office.

## 2019-05-16 NOTE — PROGRESS NOTES
Endocrinology Visit    Chief Complaint: Type 2 diabetes, adrenal insufficiency, hypothyroidism    History of Present Illness: Alexander Jones is a 66 y.o. female who returns for f/u of adrenal insufficiency, hypothyroidism, and type 2 diabetes mellitus. Records from her previous endocrinologist Dr Pina Sweet indicate pt had pituitary Cushings - treatment of which involved XRT (presumably to her pituitary) and BL adrenalectomy. She subsequently developed primary AI and is on hydrocortisone and fludrocortisone replacement. I saw her in initial consultation in December 2016 at which time I made several adjustments to her medication regimen as detailed by problem below. In May, we decided to try switching Novolog to Victoza + metformin to help improve her blood sugar control (A1c was 9.6%) and weight. She lost 16 lbs since and A1c had decreased to 8.0% in August. However, since her living situation changed (moved to [facility]), her blood sugar control worsened. I resumed mealtime insulin last year since she had more assistance with medication administration and blood sugar checks. At her last visit, A1c was 9.8% which her family attributed to her eating more sweets and snacks. She has since moved to [place] and her meals are more supervised. POC A1c today is 7.0%. Current glycemic medication regimen is Victoza 1.8mg daily, metformin XR 500mg BID, Humalog 10 units TIDcc (plus correction 2 units for each 50>200), and Tresiba 30 units Qam.   Home blood glucose monitoring frequency: 3-4 times/day with [employer] (does not have log or reader with her today)  Glucose range at home: 77 (fasting this morning, not recorded). Other values are unknown (pt is a poor historian). Known complications include CKD and neuropathy. Last eye exam was in June - no retinopathy. She does exercise, walks every day. Her food is provided at her [facility], and she has a NCS diet ordered.      She also has a history of hypothyroidism and is currently on generic levothyroxine 112 mcg daily. 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 She takes her medication first thing in the morning on an empty stomach and waits at least 30 minutes before eating or taking her other meds. She voices no complaints today. Denies palpitations, tremors, changes in bowel habits or energy level. Regarding her AI, she currently takes HC 15mg Qam, 5mg Qpm. I reduced her morning dose from 20mg to 15mg. She is also taking fludrocortisone 0.1 mg daily.      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    Depression F32.9    Elevated cholesterol E78.00    Vitamin D deficiency E55.9    Adrenal insufficiency (HCC) E27.40    Hypothyroidism E03.9    Intracranial vascular stenosis I67.9    Meningioma ([facility] D32.9    Cerebral infarction due to stenosis of left posterior cerebral artery (HCC) X52.899    Unstable angina (HCC) I20.0    Type 2 diabetes mellitus with stage 3 chronic kidney disease, with long-term current use of insulin (HCC) E11.22, N18.3, Z79.4    Dementia F03.90    History of mammography, screening Z92.89    Colon cancer screening Z12.11       Past Medical History:    Past Medical History:   Diagnosis Date    Arthritis osteoporosis    Cataract     Diabetes ([facility]     Endocrine disease 1972    adrenal insufficiency - bilat adrenalectomy    GERD (gastroesophageal reflux disease)     H/O Cushing disease     Hypertension     Other ill-defined conditions(799.89)     elizabeth's disease    Stroke ([facility] 2016    TIA 2011 & L occipial stroke 4/2016    Thyroid disease hypothyroid    UTI (lower urinary tract infection) Past Surgical History:  Past Surgical History:   Procedure Laterality Date    ENDOCRINE SURGERY PROC UNLISTED Bilateral 1972    bilat adrenalectomy in 0    HX APPENDECTOMY      HX CATARACT REMOVAL Left     ON the left    HX CHOLECYSTECTOMY      HX CHOLECYSTECTOMY  2000    HX HYSTERECTOMY      HX TONSILLECTOMY         Social History:  Social History     Socioeconomic History    Marital status:      Spouse name: Not on file    Number of children: Not on file    Years of education: Not on file    Highest education level: Not on file   Occupational History    Not on file   Social Needs    Financial resource strain: Not on file    Food insecurity:     Worry: Not on file     Inability: Not on file    Transportation needs:     Medical: Not on file     Non-medical: Not on file   Tobacco Use    Smoking status: Never Smoker    Smokeless tobacco: Never Used   Substance and Sexual Activity    Alcohol use: No    Drug use: No    Sexual activity: Not Currently   Lifestyle    Physical activity:     Days per week: Not on file     Minutes per session: Not on file    Stress: Not on file   Relationships    Social connections:     Talks on phone: Not on file     Gets together: Not on file     Attends Taoism service: Not on file     Active member of club or organization: Not on file     Attends meetings of clubs or organizations: Not on file     Relationship status: Not on file    Intimate partner violence:     Fear of current or ex partner: Not on file     Emotionally abused: Not on file     Physically abused: Not on file     Forced sexual activity: Not on file   Other Topics Concern    Not on file   Social History Narrative    Not on file       Family History:  Family History   Problem Relation Age of Onset    Cancer Mother     Diabetes Mother     Stroke Mother     Psychiatric Disorder Mother     Dementia Mother     Headache Mother     Heart Disease Father     Psychiatric Disorder Father     Stroke Father     Asthma Sister     Diabetes Sister     Asthma Brother     Heart Disease Brother        Medications:     Current Outpatient Medications:     naproxen (NAPROSYN) 250 mg tablet, Take 1 Tab by mouth two (2) times daily as needed for Pain., Disp: 60 Tab, Rfl: 0    loperamide (IMMODIUM) 2 mg tablet, 4mg PO X1 at onset of diarrhea. Then 2mg after each loose stool. Max dose of 16mg per day, Disp: 30 Tab, Rfl: 3    insulin lispro (HUMALOG KWIKPEN INSULIN) 100 unit/mL kwikpen, Inject 10 units TIDcc, add correction if pre-meal glucose if over 200:  200-249: 2 units  250-299: 4 units   300-349: 6 units  350-399: 8 units  Over 400: 10 units and notify me, Disp: 5 Pen, Rfl: 3    levothyroxine (SYNTHROID) 112 mcg tablet, Take 1 Tab by mouth Daily (before breakfast). , Disp: 30 Tab, Rfl: 3    insulin degludec (TRESIBA FLEXTOUCH U-100) 100 unit/mL (3 mL) inpn, 30 Units by SubCUTAneous route daily. , Disp: 15 mL, Rfl: 3    flash glucose sensor (FREESTYLE EMI 14 DAY SENSOR) kit, Use to check blood sugars. Change site every 14 days. , Disp: 2 Kit, Rfl: 11    flash glucose scanning reader (FREESTYLE EMI 14 DAY READER) [facility], Use to check blood sugars. Change site every 14 days. , Disp: 2 Each, Rfl: 0    flash glucose sensor (FREESTYLE EMI 10 DAY SENSOR) kit, Use to check blood sugars 4 times a day E11.65, Disp: 3 Kit, Rfl: 11    flash glucose scanning reader (FREESTYLE EMI 10 DAY READER) misc, Use to check blood sugar 4 times a day E11.65, Disp: 1 Each, Rfl: 0    amLODIPine (NORVASC) 5 mg tablet, TAKE ONE TABLET BY MOUTH EVERY DAY, Disp: 30 Tab, Rfl: 4    lisinopril (PRINIVIL, ZESTRIL) 20 mg tablet, TAKE ONE TABLET BY MOUTH EVERY DAY FOR HIGH BLOOD PRESSURE, Disp: 16 Tab, Rfl: 4    escitalopram oxalate (LEXAPRO) 10 mg tablet, Take 1 Tab by mouth daily.  Indications: major depressive disorder, Disp: 30 Tab, Rfl: 3    naproxen (NAPROSYN) 500 mg tablet, Take 500 mg by mouth two (2) times daily (with meals). , Disp: , Rfl:     BD ULTRA-FINE MINI PEN NEEDLE 31 gauge x 3/16\" ndle, USE TO INJECT TRESIBA AND VICTOZA AS DIRECTED, Disp: 30 Pen Needle, Rfl: 3    furosemide (LASIX) 40 mg tablet, Take 1 Tab by mouth daily. , Disp: 90 Tab, Rfl: 3    metFORMIN ER (GLUCOPHAGE XR) 500 mg tablet, Take 1 Tab by mouth two (2) times daily (with meals). , Disp: 180 Tab, Rfl: 3    hydrocortisone (CORTEF) 5 mg tablet, TAKE 3 TABLETS IN THE MORNING AND TAKE 1 TABLET IN THE EVENING, Disp: 120 Tab, Rfl: 10    fludrocortisone (FLORINEF) 0.1 mg tablet, Take 1 Tab by mouth daily. , Disp: 90 Tab, Rfl: 3    aspirin 81 mg chewable tablet, Take 1 Tab by mouth daily. , Disp: 100 Tab, Rfl: 3    atorvastatin (LIPITOR) 80 mg tablet, Take 1 Tab by mouth daily. , Disp: 90 Tab, Rfl: 3    cranberry 500 mg capsule, Take 1 Cap by mouth daily. , Disp: 100 Cap, Rfl: 3    CULTURELLE PROBIOTICS 10 billion cell -200 mg cpSP, Take 1 Cap by mouth two (2) times a day., Disp: 100 Cap, Rfl: 5    ESTRACE 0.01 % (0.1 mg/gram) vaginal cream, , Disp: , Rfl: 3    fosfomycin (MONUROL) 3 gram pack oral packet, Take 3 g by mouth once., Disp: , Rfl:     lidocaine (ZTLIDO) 1.8 % ptmd, 1 Patch by Apply Externally route daily. On 12 hours then off fo 12 hours, Disp: 20 Patch, Rfl: 1    clotrimazole-betamethasone (LOTRISONE) topical cream, Apply  to affected area two (2) times a day., Disp: , Rfl:     OTHER, Indications: accu-chek three times a day, Disp: , Rfl:     PRODIGY NO CODING strip, USE TO TEST BLOOD SUGAR 3 TIMES A DAY, Disp: 100 Strip, Rfl: 5    glucose blood VI test strips (PRODIGY NO CODING) strip, Patient test glucose 3 times daily. ICD E11.9, Disp: 150 Strip, Rfl: 1    Allergies:   Allergies   Allergen Reactions    Amoxicillin Unknown (comments)    Erythromycin Rash and Unknown (comments)     fever       Physical Examination:  Visit Vitals  /69   Pulse 83   Resp 16   Ht 5' (1.524 m)   Wt 164 lb 4.8 oz (74.5 kg)   SpO2 96%   BMI 32.09 kg/m²      Gen: elderly female in no acute distress  HEENT: mucous membranes moist  Thyroid: no enlargement or nodules noted  CAD: normal rate, regular rhythm  PULM: unlabored respirations  EXT: no clubbing, cyanosis or edema  Neuro: grossly non focal, reflexes are brisk but no tremor of outstretched hands  Psych: pleasant, fair insight into medical hx  Skin: warm, dry    Clinical Data Review:  Lab Results   Component Value Date/Time    Hemoglobin A1c 9.8 (H) 01/31/2019 12:24 PM     Lab Results   Component Value Date/Time    Sodium 141 03/09/2019 02:47 PM    Potassium 3.5 03/09/2019 02:47 PM    Chloride 105 03/09/2019 02:47 PM    CO2 28 03/09/2019 02:47 PM    Anion gap 8 03/09/2019 02:47 PM    Glucose 195 (H) 03/09/2019 02:47 PM    BUN 24 (H) 03/09/2019 02:47 PM    Creatinine 1.11 (H) 03/09/2019 02:47 PM    BUN/Creatinine ratio 22 (H) 03/09/2019 02:47 PM    GFR est AA 58 (L) 03/09/2019 02:47 PM    GFR est non-AA 48 (L) 03/09/2019 02:47 PM    Calcium 8.6 03/09/2019 02:47 PM     Lab Results   Component Value Date/Time    Microalb/Creat ratio (ug/mg creat.) 88.5 (H) 12/05/2017 09:36 AM     Lab Results   Component Value Date/Time    Cholesterol, total 100 03/27/2018 10:25 AM    HDL Cholesterol 52 03/27/2018 10:25 AM    LDL, calculated 33 03/27/2018 10:25 AM    VLDL, calculated 15 03/27/2018 10:25 AM    Triglyceride 74 03/27/2018 10:25 AM    CHOL/HDL Ratio 6.0 (H) 10/13/2016 12:13 PM     Lab Results   Component Value Date/Time    Hemoglobin A1c 9.8 (H) 01/31/2019 12:24 PM     Lab Results   Component Value Date/Time    TSH 0.130 (L) 01/31/2019 12:24 PM    Triiodothyronine (T3), free 1.7 (L) 02/07/2019 12:15 PM    T4, Free 1.20 02/07/2019 12:15 PM    T4, Total 10.1 04/23/2011 04:07 AM      Assessment and Plan:  Frank Smith is a 66 y.o. female here for type 2 diabetes. Control is currently at goal (A1c 7%) on regimen of GLP1a and basal-bolus insulin.  I do not have blood sugar data to guide insulin titration today, so will continue current regimen. 1. Type 2 diabetes mellitus with stage 3 chronic kidney disease, with long-term current use of insulin (HCC)     Glycemic Medication Changes:  - continue Tresiba to 30 units daily  - continue Humalog 10 units with each meal    - hold if pre-meal blood sugar is below 100              - add correction 2 units for each 50>200  - continue Victoza 1.8mg daily  - continue metformin XR 500mg twice a day  - check blood sugars at minimum 4x/d: ACTID and Hs    DM Health Maintenance: pertinent items updated in HM tab  A1c: UTD  Cv/Lipids: on atorvastatin, lipids UTD  BP/Renal: BP at goal, on ACEi, microalbumin: update today  Podiatry: no active issues, encouraged well-fitting footwear and daily inspection  Neuro: + neuropathy, foot exam UTD  Ophtho: yearly eye exam recommended  Diet and exercise: discussed healthy eating and exercise recommendations, particularly reduction in dietary carbohydrates      2. Adrenal insufficiency ([address]): primary s/p BL adrenalectomy. Continue HC 15mg Qam, 5mg Qpm. Recent sodium/potassium levels were stable - continue fludrocortisone. 3. Acquired hypothyroidism: euthyroid on exam after recent LT4 dose adjustment (increase from 100 to 112). Note, I am following FT3/FT4 levels rather than TSH due to central hypothyroidism. I spent 25 minutes with the patient today and > 50% of the time was spent counseling the patient about thyroid and adrenal medication management, also diabetes management including medication options and dietary modification. Patient verbalized an understanding and will return to clinic in 3 months. Thank you for the opportunity to participate in this patient's care.     Kirsty Jaimes MD  [facility]  [facility]

## 2019-05-16 NOTE — PROGRESS NOTES
Lab Results   Component Value Date/Time    TSH 0.130 (L) 01/31/2019 12:24 PM    Triiodothyronine (T3), free 1.7 (L) 02/07/2019 12:15 PM    T4, Free 1.20 02/07/2019 12:15 PM    T4, Total 10.1 04/23/2011 04:07 AM

## 2019-05-17 ENCOUNTER — TELEPHONE (OUTPATIENT)
Dept: ENDOCRINOLOGY | Age: 79
End: 2019-05-17

## 2019-05-17 LAB
ALBUMIN/CREAT UR: 11 MG/G CREAT (ref 0–30)
CREAT UR-MCNC: 31.9 MG/DL
MICROALBUMIN UR-MCNC: 3.5 UG/ML

## 2019-05-17 RX ORDER — FUROSEMIDE 40 MG/1
40 TABLET ORAL DAILY
Qty: 90 TAB | Refills: 3 | Status: SHIPPED | OUTPATIENT
Start: 2019-05-17 | End: 2019-01-01 | Stop reason: SDUPTHER

## 2019-05-17 NOTE — TELEPHONE ENCOUNTER
5/17/2019  11:09 AM    Pharmacist called in stating that he needs a clarification on Humalog prescription. He would like a call back.     Thanks

## 2019-05-17 NOTE — TELEPHONE ENCOUNTER
Spoke with Jayjay the pharmacist at [address] [place] [address] and he stated the pt has a Rx for Novolog and wanted to know if she's to take both of the insulins.      According to pt's med list, Novolog was d/c 5/2017

## 2019-05-20 ENCOUNTER — PATIENT MESSAGE (OUTPATIENT)
Dept: ENDOCRINOLOGY | Age: 79
End: 2019-05-20

## 2019-05-20 RX ORDER — ATORVASTATIN CALCIUM 10 MG/1
10 TABLET, FILM COATED ORAL DAILY
Qty: 30 TAB | Refills: 0 | Status: CANCELLED | OUTPATIENT
Start: 2019-05-20

## 2019-05-20 RX ORDER — ATORVASTATIN CALCIUM 80 MG/1
80 TABLET, FILM COATED ORAL DAILY
Qty: 30 TAB | Refills: 1 | Status: SHIPPED | OUTPATIENT
Start: 2019-05-20 | End: 2019-05-23 | Stop reason: SDUPTHER

## 2019-05-23 ENCOUNTER — OFFICE VISIT (OUTPATIENT)
Dept: INTERNAL MEDICINE CLINIC | Age: 79
End: 2019-05-23

## 2019-05-23 VITALS
HEART RATE: 78 BPM | HEIGHT: 62 IN | SYSTOLIC BLOOD PRESSURE: 116 MMHG | TEMPERATURE: 97.9 F | BODY MASS INDEX: 29.63 KG/M2 | WEIGHT: 161 LBS | OXYGEN SATURATION: 96 % | DIASTOLIC BLOOD PRESSURE: 60 MMHG | RESPIRATION RATE: 16 BRPM

## 2019-05-23 DIAGNOSIS — I63.532 CEREBRAL INFARCTION DUE TO STENOSIS OF LEFT POSTERIOR CEREBRAL ARTERY (HCC): ICD-10-CM

## 2019-05-23 DIAGNOSIS — I10 BENIGN HYPERTENSION: Primary | ICD-10-CM

## 2019-05-23 DIAGNOSIS — F32.A DEPRESSION, UNSPECIFIED DEPRESSION TYPE: Chronic | ICD-10-CM

## 2019-05-23 DIAGNOSIS — E78.2 MIXED HYPERLIPIDEMIA: ICD-10-CM

## 2019-05-23 DIAGNOSIS — F03.90 DEMENTIA WITHOUT BEHAVIORAL DISTURBANCE, UNSPECIFIED DEMENTIA TYPE: ICD-10-CM

## 2019-05-23 DIAGNOSIS — E03.9 HYPOTHYROIDISM, ADULT: ICD-10-CM

## 2019-05-23 RX ORDER — LEVOTHYROXINE SODIUM 112 UG/1
112 TABLET ORAL
Qty: 90 TAB | Refills: 3 | Status: SHIPPED | OUTPATIENT
Start: 2019-05-23 | End: 2019-01-01 | Stop reason: SDUPTHER

## 2019-05-23 RX ORDER — LISINOPRIL 20 MG/1
TABLET ORAL
Qty: 90 TAB | Refills: 1 | Status: SHIPPED | OUTPATIENT
Start: 2019-05-23 | End: 2019-01-01 | Stop reason: CLARIF

## 2019-05-23 RX ORDER — AMLODIPINE BESYLATE 5 MG/1
TABLET ORAL
Qty: 90 TAB | Refills: 1 | Status: SHIPPED | OUTPATIENT
Start: 2019-05-23 | End: 2019-01-01 | Stop reason: SDUPTHER

## 2019-05-23 RX ORDER — ESCITALOPRAM OXALATE 10 MG/1
10 TABLET ORAL DAILY
Qty: 90 TAB | Refills: 1 | Status: SHIPPED | OUTPATIENT
Start: 2019-05-23 | End: 2019-01-01 | Stop reason: SDUPTHER

## 2019-05-23 RX ORDER — GUAIFENESIN 100 MG/5ML
81 LIQUID (ML) ORAL DAILY
Qty: 100 TAB | Refills: 3 | Status: SHIPPED | OUTPATIENT
Start: 2019-05-23 | End: 2019-01-01 | Stop reason: SDUPTHER

## 2019-05-23 RX ORDER — ATORVASTATIN CALCIUM 80 MG/1
80 TABLET, FILM COATED ORAL DAILY
Qty: 30 TAB | Refills: 1 | Status: SHIPPED | OUTPATIENT
Start: 2019-05-23 | End: 2019-01-01 | Stop reason: SDUPTHER

## 2019-05-23 NOTE — PROGRESS NOTES
Subjective:  
  
Sweta Hull is a 66 y.o. female who presents today dementia, hypothyroid  hyperlipidemia  hypertension She followed with Dr. Leonides Jack for her insulin dependent diabetes mellitus . She has an appointment with Dr. Jhonny Mendes for follow up in September, 2019 due to Dr. Leonides Jack leaving the practice. She is here with her attendant, Urvashi Wu. She lives at [facility]. No falls. Has been doing well. Patient denies any pain. Patient Active Problem List  
Diagnosis Code  TIA (transient ischemic attack) G45.9  Essential hypertension, benign I10  
 Recurrent UTI N39.0  RLS (restless legs syndrome) G25.81  
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  
 Unstable angina ([facility]) I20.0  Type 2 diabetes mellitus with stage 3 chronic kidney disease, with long-term current use of insulin ([facility]) E11.22, N18.3, Z79.4  Dementia F03.90  
 History of mammography, screening Z92.89  
 Colon cancer screening Z12.11 Current Outpatient Medications Medication Sig Dispense Refill  atorvastatin (LIPITOR) 80 mg tablet Take 1 Tab by mouth daily. 30 Tab 1  
 levothyroxine (SYNTHROID) 112 mcg tablet Take 1 Tab by mouth Daily (before breakfast). 90 Tab 3  
 aspirin 81 mg chewable tablet Take 1 Tab by mouth daily. 100 Tab 3  
 lactobacillus rhamnosus gg 10 billion cell (CULTURELLE) 10 billion cell capsule Take 1 Cap by mouth two (2) times a day. 180 Cap 1  
 escitalopram oxalate (LEXAPRO) 10 mg tablet Take 1 Tab by mouth daily.  Indications: major depressive disorder 90 Tab 1  
 lisinopril (PRINIVIL, ZESTRIL) 20 mg tablet TAKE ONE TABLET BY MOUTH EVERY DAY FOR HIGH BLOOD PRESSURE 90 Tab 1  
 amLODIPine (NORVASC) 5 mg tablet TAKE ONE TABLET BY MOUTH EVERY DAY 90 Tab 1  varicella-zoster recombinant, PF, (SHINGRIX) 50 mcg/0.5 mL susr injection 0.5 mL by IntraMUSCular route once for 1 dose. Repeat in 2-6 months 0.5 mL 1  
 hydrocortisone (CORTEF) 5 mg tablet TAKE 3 TABLETS IN THE MORNING AND TAKE 1 TABLET IN THE EVENING 360 Tab 0  
 metFORMIN ER (GLUCOPHAGE XR) 500 mg tablet TAKE ONE TABLET BY MOUTH 2 TIMES A DAY WITH MEALS 90 Tab 0  
 fludrocortisone (FLORINEF) 0.1 mg tablet TAKE ONE TABLET BY MOUTH EVERY DAY 90 Tab 0  
 furosemide (LASIX) 40 mg tablet Take 1 Tab by mouth daily. 90 Tab 3  
 MYRBETRIQ 50 mg ER tablet   11  
 traMADol (ULTRAM) 50 mg tablet Take 50 mg by mouth every six (6) hours as needed for Pain.  VICTOZA 3-EDGARDO 0.6 mg/0.1 mL (18 mg/3 mL) pnij 1.8 mg by SubCUTAneous route daily. 3 Pen 11  
 insulin degludec (TRESIBA FLEXTOUCH U-100) 100 unit/mL (3 mL) inpn 30 Units by SubCUTAneous route daily. 15 mL 3  
 insulin lispro (HUMALOG KWIKPEN INSULIN) 100 unit/mL kwikpen Inject 10 units TIDcc, add correction if pre-meal glucose if over 200: 
 200-249: 2 units 250-299: 4 units 300-349: 6 units 350-399: 8 units Over 400: 10 units and notify me 5 Pen 3  
 fosfomycin (MONUROL) 3 gram pack oral packet Take 3 g by mouth once.  naproxen (NAPROSYN) 250 mg tablet Take 1 Tab by mouth two (2) times daily as needed for Pain. 60 Tab 0  
 loperamide (IMMODIUM) 2 mg tablet 4mg PO X1 at onset of diarrhea. Then 2mg after each loose stool. Max dose of 16mg per day 30 Tab 3  
 flash glucose sensor (FREESTYLE EMI 14 DAY SENSOR) kit Use to check blood sugars. Change site every 14 days. 2 Kit 11  
 flash glucose scanning reader (FREESTYLE EMI 14 DAY READER) misc Use to check blood sugars. Change site every 14 days. 2 Each 0  
 lidocaine (ZTLIDO) 1.8 % ptmd 1 Patch by Apply Externally route daily. On 12 hours then off fo 12 hours 20 Patch 1  
 clotrimazole-betamethasone (LOTRISONE) topical cream Apply  to affected area two (2) times a day.  naproxen (NAPROSYN) 500 mg tablet Take 500 mg by mouth two (2) times daily (with meals).  OTHER Indications: accu-chek three times a day  BD ULTRA-FINE MINI PEN NEEDLE 31 gauge x 3/16\" ndle USE TO INJECT TRESIBA AND VICTOZA AS DIRECTED 30 Pen Needle 3  
 PRODIGY NO CODING strip USE TO TEST BLOOD SUGAR 3 TIMES A  Strip 5  
 cranberry 500 mg capsule Take 1 Cap by mouth daily. 100 Cap 3  
 ESTRACE 0.01 % (0.1 mg/gram) vaginal cream   3  
 glucose blood VI test strips (PRODIGY NO CODING) strip Patient test glucose 3 times daily. ICD E11.9 150 Strip 1 Review of Systems Pertinent items are noted in HPI. Objective:  
 
Visit Vitals /60 (BP 1 Location: Left arm, BP Patient Position: Sitting) Pulse 78 Temp 97.9 °F (36.6 °C) (Oral) Resp 16 Ht 5' 1.75\" (1.568 m) Wt 161 lb (73 kg) SpO2 96% BMI 29.69 kg/m² General appearance: alert, cooperative, no distress, appears stated age Head: Normocephalic, without obvious abnormality, atraumatic Neck: supple, symmetrical, trachea midline, no adenopathy, no carotid bruit and no JVD Lungs: clear to auscultation bilaterally Heart: regular rate and rhythm, S1, S2 normal, no murmur, click, rub or gallop Extremities: extremities normal, atraumatic, no cyanosis or edema Assessment/Plan: 1. Cerebral infarction due to stenosis of left posterior cerebral artery (HCC) -ASA refilled today. - aspirin 81 mg chewable tablet; Take 1 Tab by mouth daily. Dispense: 100 Tab; Refill: 3 2. Depression, unspecified depression type 
-I evaluated and recommended to continue current doses of medications. - escitalopram oxalate (LEXAPRO) 10 mg tablet; Take 1 Tab by mouth daily. Indications: major depressive disorder  Dispense: 90 Tab; Refill: 1 3. Benign hypertension -well controlled on curent dose of norvasc and lisinopril 4. Hypothyroidism, adult 
-clinically euthyroid 5. Mixed hyperlipidemia 6. Dementia without behavioral disturbance, unspecified dementia type 
-no significant changes. 7.  Health Care Maintenance   
-recommended getting the shingles vaccine. A prescription for Shingrix was given to the patient. Orders Placed This Encounter  DISCONTD: varicella-zoster recombinant, PF, (SHINGRIX) 50 mcg/0.5 mL susr injection Si.5 mL by IntraMUSCular route once for 1 dose. Repeat in 2-6 months Dispense:  0.5 mL Refill:  1  
 atorvastatin (LIPITOR) 80 mg tablet Sig: Take 1 Tab by mouth daily. Dispense:  30 Tab Refill:  1  
 levothyroxine (SYNTHROID) 112 mcg tablet Sig: Take 1 Tab by mouth Daily (before breakfast). Dispense:  90 Tab Refill:  3 Dose Adjustment  aspirin 81 mg chewable tablet Sig: Take 1 Tab by mouth daily. Dispense:  100 Tab Refill:  3  
 lactobacillus rhamnosus gg 10 billion cell (CULTURELLE) 10 billion cell capsule Sig: Take 1 Cap by mouth two (2) times a day. Dispense:  180 Cap Refill:  1  escitalopram oxalate (LEXAPRO) 10 mg tablet Sig: Take 1 Tab by mouth daily. Indications: major depressive disorder Dispense:  90 Tab Refill:  1  
 lisinopril (PRINIVIL, ZESTRIL) 20 mg tablet Sig: TAKE ONE TABLET BY MOUTH EVERY DAY FOR HIGH BLOOD PRESSURE Dispense:  90 Tab Refill:  1  
 amLODIPine (NORVASC) 5 mg tablet Sig: TAKE ONE TABLET BY MOUTH EVERY DAY Dispense:  90 Tab Refill:  1  varicella-zoster recombinant, PF, (SHINGRIX) 50 mcg/0.5 mL susr injection Si.5 mL by IntraMUSCular route once for 1 dose. Repeat in 2-6 months Dispense:  0.5 mL Refill:  1 Follow-up Disposition:  
 
Follow up in 6 months Return if symptoms worsen or fail to improve.   
Advised patient to call back or return to office if symptoms worsen/change/persist.  
 
Discussed expected course/resolution/complications of diagnosis in detail with patient. Medication risks/benefits/costs/interactions/alternatives discussed with patient. Patient was given an after visit summary which includes diagnoses, current medications, & vitals. Patient expressed understanding with the diagnosis and plan.

## 2019-08-02 NOTE — TELEPHONE ENCOUNTER
----- Message from Kaylen Gilman sent at 8/1/2019  4:52 PM EDT -----  Regarding: DR MUNIZ  / TELEPHONE  Level 1-- Transferred to Answering service      OLAMIDE from:  [facility]     Reports that patient had a fall today Thursday 8/1/19 @ 4:30 pm   and is now complaining of right hip pain.  Requesting an order for an X-Ray    Phone: [phone]  Fax : [phone] required           Kaylen Gilman

## 2019-08-16 NOTE — TELEPHONE ENCOUNTER
Received an incoming call from Jamie from Monica at [facility]. She states patient's blood sugar was 49 this morning, and she does not have any parameters for blood sugars. She states that she has been working all morning to bring it up and at this time it 85. She states over the last 2-3 days blood sugars have been running 76-78, and last week it was in the 100's. Advised Samantha I will let PCP know of information, and also advises that she sees endocrinology and provided Dr. Debra Hawkins phone number, who was been corresponding and refilling patient's medications since Dr. Jackson Minium departure.

## 2019-08-21 NOTE — TELEPHONE ENCOUNTER
Spoke with Janeen Hicks (nurse) at [facility] and she stated that Mrs. Alie Adames' (former patient of Dr. Evan Clemens) blood sugar was 424. She stated at 8am her reading was 174 and at 12pm it was 76. Janeen Fabiola stated that Mrs. Alie Adames is currently taking 30 units of Tresiba in the AM and 10 units of Humalog 3 times a day with meals plus a sliding scale if her readings are above 400 (10 extra units). Janeen Hicks gave Mrs. Lennon 20 units of Humalog before calling us. Per Dr. Hitesh Cosme is to give Mrs. Lennon some water and in 2 hours to recheck Mrs. Alie Adames blood sugar.

## 2019-08-22 NOTE — TELEPHONE ENCOUNTER
Spoke with Chalino at [facility]. Informed Chalino that patient has a new PCP, not the provider listed on request (Dr. Mariama Son). Medication also is not a current medication and has been discontinued by endocrinologist.     Also advised Chalino patient's diabetes medications are managed by endocrinologist office. Provided that phone number for further assistance with PA request for Novolog.

## 2019-08-22 NOTE — TELEPHONE ENCOUNTER
Cover my meds called wanted to know if we received a prior auth for patients Nuvalog   Please call them  At [phone]  REF# [id number]

## 2019-08-22 NOTE — TELEPHONE ENCOUNTER
Received a page from Tammie Amato at [address] [place] [address] at 7:51 pm who told me that patient's repeat blood sugar read \"hi. \"  She said that patient was given the 20 units of humalog as directed for her sugar of 424 but then ate potatoes and chicken and hamburger for dinner and after dinner was found in her room to be drinking regular soda and eating fig newtons. She does not have any fever to suggest her high sugar is from infection or any other infectious symptoms like cough or n/v/d and this is all likely from dietary indiscretion. She is due for her 30 units of tresiba at bedtime and I advised Tammie Amato to give this now and give another 12 units of humalog at this time and have pt drink 32 oz of water over the next 2 hours and then give me a call back in 2 hours with her repeat sugar. she voiced understanding of this plan. Spoke to Tammie Amato again at 10pm and she repeated her blood sugar and it was down to 359. I advised her to give patient another 6 units of humalog now and hopefully her sugar will be closer to 200 of lower by the morning. I told her to call me with any other questions or concerns. she voiced understanding of this plan.

## 2019-08-29 NOTE — TELEPHONE ENCOUNTER
This is Dr. Adams Bis patient. Spoke with Je Marcos at [facility]. She states that patient's blood sugar is 413. She has been given 10 units of Humalog. The lunch time blood sugar was not recorded. Her fasting blood sugar was 90. No insulin was given at breakfast or lunch. For lunch:   Garlic rice, biscuit, chopped spinach and whole wheat roll. Je Marcos said that she was advised to call if blood sugar is over 400. She can be reached at [phone].

## 2019-08-29 NOTE — TELEPHONE ENCOUNTER
I spoke with Lorraine Grande about this as well    I recommend and additional 10 units now (total of 20 units) for the high glucose. She will see Dr Lisa Sanchez on 9/12/2019. Please be sure they bring glucose logs and insulin dosing with her.

## 2019-08-29 NOTE — TELEPHONE ENCOUNTER
Spoke with Kenyatta Wong the med tech at [facility] to make her aware of what Dr Keegan Irizarry recommendations were for the pt's high bs, but prior to me sharing the information, she stated that she had already given the pt at 5pm 20 units of the humalog along with a glass of water which was what Dr Radha Wheeler was going to recommend the staff to do as well. Kenyatta Wong then stated that the pt's bs was now 402.  Kenyatta Wong was encouraged to have the pt to bring in her bs reading to her appt with Dr Abelino Gallardo on 9/12/19

## 2019-09-02 NOTE — TELEPHONE ENCOUNTER
Gayle Solorio from [facility] paged  me regarding Mrs Angel Nettles on 8/31/2019. Time of page - 5:31 pm on 8/31/2019. Glucose was 493 and she wanted to confirm Humalog dosing. They had already followed the directions provided and given her 10 units of Humalog for the meal plus additional 10 units for the high glucose (20 units in total). They were wondering if I want to provide any additional orders. Glucoses earlier that day were 74 at breakfast and 119 at lunch. They commented that glucose was likely high due to snacking in the afternoon.     I did not recommend any changes to existing orders  We will forward to Dr. Jackie Cali

## 2019-09-05 NOTE — TELEPHONE ENCOUNTER
Spoke with Yessica at [facility] and she wanted clarity of the pt's orders for for her insulin. She was told per Dr Antonio Marques, that the pt's is to get 10 units TID on Humalog with meals, and for her sliding scale, she's to get an additional 2 units for every 50 points over 200. Yessica then stated that the pt was sent home from the hospital on yesterday and has had bs in the 400s. She then stated that the order for her insulin from the hosptial only stated to give the pt \"5-12 units of insulin as needed\"     Pt is still on   Tresiba 30  Victoza 1.8  Metformin ER 500mg 1 tab bid  Along with the humalog. Pt is also taking hydrocortisone   Yessica would like to know if you eileen be changing her insulin order.      Please fax new order to [phone] or to [phone]

## 2019-09-09 NOTE — TELEPHONE ENCOUNTER
vickie from [facility] wants someone to call john  At [phone] this is about  tercram which the ph does not carry and wants to know if she can use the canberry capsule

## 2019-09-10 NOTE — TELEPHONE ENCOUNTER
Spoke with Carol Curiel at [facility], neither Katia or Nolan Rival was available at time of call. Franklyn Dakins patient can take cranberry capsules per PCP, and if there are further questions to please call the office back.

## 2019-09-12 NOTE — TELEPHONE ENCOUNTER
Please call [facility] [address] [facility] at [phone] and have them fax over her current list of medications and her most recent documented blood sugars over the past week.

## 2019-09-12 NOTE — ED TRIAGE NOTES
Daughter arrived and stated pt started c/o headache , denies injury, denies fever, denies being on any blood thinners

## 2019-09-12 NOTE — PROGRESS NOTES
Chief Complaint   Patient presents with    Diabetes     pcp and pharmacy confirmed     History of Present Illness: Jake Clemente is a 78 y.o. female here for follow up of diabetes. Weight down 5 lbs since last visit in 5/19. She is transferring care from Dr. Carlita Thompson to me today. She is currently living in a nursing facility and the facility has called us multiple times over the past few months due to elevated blood sugars over 400 as this was the parameter that Dr. Carlita Thompson had set out. She has trouble with her memory and likes to eat sweets and often sneaks sodas and junk food into her room that can cause her sugar to go up quite high. This morning she was due to go to the hearing doctor and her daughter, Sarthak Wills, who is with her today went to pick her up and she was very confused and found to have a sugar in the 40s and she had not yet eaten breakfast yet. Her daughter doesn't think she had received any insulin yet. She was treated with oral glucose and given breakfast and then was driving to the doctor and developed a bad headache so her daughter took her to [facility] where she was seen and evaluated and had a normal CT of her head and was discharged and then came to her visit with me today. She is compliant with her levothyroxine and hydrocortisone. She is currently having her sugar checked 4 times per day over the past 90 days at the nursing facility. She is also using the freestyle mely to help manage her sugars.     she has the following indications to continue treatment with freestyle mely:  1) she has type 2 diabetes and is on an intensive insulin regimen with 4 injections per day  2) she tests her blood sugar 4 times per day and makes treatment decisions off her blood sugar readings and sensor readings  3) she requires adjustments to her insulin doses based on her  sensor readings  4) she has benefited from therapeutic continuous glucose monitoring and I recommend that she continue with this  5) she is seen in my office every 3-6 months    After her visit, I received a fax with her updated med list and updated it as below. I also received her blood sugars over the past week and her fasting sugars have been , pre-lunch , pre-dinner [phone]. Majority of them are between 100-250. Current Outpatient Medications   Medication Sig    ascorbic acid, vitamin C, (VITAMIN C) 500 mg tablet Take 500 mg by mouth daily.  clopidogrel (PLAVIX) 75 mg tab Take 75 mg by mouth daily.  ergocalciferol (VITAMIN D2) 50,000 unit capsule Take 50,000 Units by mouth two (2) days a week. On Sundays and Thursdays    cranberry extract (THERACRAN HP FOR KIDS) 50 mg chew Take 1 Tab by mouth two (2) times a day.  acetaminophen (TYLENOL) 500 mg tablet Take  by mouth every six (6) hours as needed for Pain.  diphenhydrAMINE (BENADRYL ALLERGY) 25 mg tablet Take 25 mg by mouth every six (6) hours as needed.  lactobacillus rhamnosus gg 10 billion cell (CULTURELLE) 10 billion cell capsule Take 1 Cap by mouth daily.  lisinopril (PRINIVIL, ZESTRIL) 40 mg tablet Take 1 tab daily    MYRBETRIQ 50 mg ER tablet Take 1 tab daily    metFORMIN (GLUCOPHAGE) 500 mg tablet Take 1 tab twice daily    potassium chloride SR (KLOR-CON 10) 10 mEq tablet Take 1 tab twice daily    atorvastatin (LIPITOR) 80 mg tablet TAKE ONE TABLET BY MOUTH EVERY DAY    fludrocortisone (FLORINEF) 0.1 mg tablet TAKE ONE TABLET BY MOUTH EVERY DAY    hydrocortisone (CORTEF) 5 mg tablet TAKE 3 TABLETS BY MOUTH EVERY MORNING AND 1 TABLET EVERY EVENING    flash glucose sensor (FREESTYLE EMI 14 DAY SENSOR) kit Use to check blood sugars. Change site every 14 days.  levothyroxine (SYNTHROID) 112 mcg tablet Take 1 Tab by mouth Daily (before breakfast).  escitalopram oxalate (LEXAPRO) 10 mg tablet Take 1 Tab by mouth daily.  Indications: major depressive disorder    amLODIPine (NORVASC) 5 mg tablet TAKE ONE TABLET BY MOUTH EVERY DAY    furosemide (LASIX) 40 mg tablet Take 1 Tab by mouth daily.  VICTOZA 3-EDGARDO 0.6 mg/0.1 mL (18 mg/3 mL) pnij 1.8 mg by SubCUTAneous route daily.  insulin degludec (TRESIBA FLEXTOUCH U-100) 100 unit/mL (3 mL) inpn 30 Units by SubCUTAneous route daily.  insulin lispro (HUMALOG KWIKPEN INSULIN) 100 unit/mL kwikpen Inject 10 units TIDcc, add correction if pre-meal glucose if over 200:   200-249: 2 units   250-299: 4 units    300-349: 6 units   350-399: 8 units   Over 400: 10 units and notify me    loperamide (IMMODIUM) 2 mg tablet 4mg PO X1 at onset of diarrhea. Then 2mg after each loose stool. Max dose of 16mg per day    flash glucose scanning reader ([employer] EMI 14 DAY READER) [facility] Use to check blood sugars. Change site every 14 days.  BD ULTRA-FINE MINI PEN NEEDLE 31 gauge x 3/16\" ndle USE TO INJECT TRESIBA AND VICTOZA AS DIRECTED    aspirin 81 mg chewable tablet Take 1 Tab by mouth daily. No current facility-administered medications for this visit. Allergies   Allergen Reactions    Amoxicillin Unknown (comments)    Erythromycin Rash and Unknown (comments)     fever     Review of Systems:  - Eyes: no blurry vision or double vision  - Cardiovascular: no chest pain  - Respiratory: no shortness of breath  - Musculoskeletal: no myalgias  - Neurological: no numbness/tingling in extremities    Physical Examination:  Blood pressure 112/46, pulse 73, height 5' 1\" (1.549 m), weight 159 lb 3.2 oz (72.2 kg).   - General: pleasant, no distress, good eye contact   - Neck: no carotid bruits  - Cardiovascular: regular, normal rate, nl s1 and s2, no m/r/g,   - Respiratory: clear bilaterally  - Integumentary: no edema,   - Psychiatric: normal mood and affect    Data Reviewed:   Component      Latest Ref Rng & Units 9/12/2019          11:40 AM   Sodium      136 - 145 mmol/L 143   Potassium      3.5 - 5.1 mmol/L 3.5   Chloride      97 - 108 mmol/L 107   CO2      21 - 32 mmol/L 25   Anion gap      5 - 15 mmol/L 11   Glucose      65 - 100 mg/dL 162 (H)   BUN      6 - 20 MG/DL 18   Creatinine      0.55 - 1.02 MG/DL 1.05 (H)   BUN/Creatinine ratio      12 - 20   17   GFR est AA      >60 ml/min/1.73m2 >60   GFR est non-AA      >60 ml/min/1.73m2 51 (L)   Calcium      8.5 - 10.1 MG/DL 8.9     Assessment/Plan:   1. Type 2 diabetes mellitus with stage 3 chronic kidney disease, with long-term current use of insulin ([facility] [address]): her most recent Hgb A1c was 7% in 5/19 down from 9.8% in 1/19. I need more information on her current blood sugars before making any changes to her doses so I have requested her current blood sugars from her facility to review. - cont tresiba 30 units daily  - cont humalog 10 units before meals + 2 units for every 50 mg/dl above 200 mg/dl  - cont metformin 500 mg 1 tab bid  - cont victoza 1.8 mg daily  - check bs 4 times daily due to fluctuating sugars  - foot exam done 3/18  - eye exam 6/18  - microalbumin nl 5/19  - check A1c and cmp prior to next visit      2. 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 Last free T4 1.2 in 2/19 on levothyroxine 112 mcg daily  - check free T4 today and prior to next visit  - cont levothyroxine 112 mcg daily      4. Hyperlipidemia LDL goal <100: LDL 33 in 3/18 on lipitor 80 mg daily  - check lipids today and prior to next visit        We spent 40 minutes of face to face time together and > 50% of the time was spent in counseling regarding management of all the conditions above. Patient Instructions   1) I will obtain the list of meds from your facility and make any adjustments to your insulin doses based on the readings they provide. 2) I will send you a message through [employer] to your daughter with your lab results.       Follow-up and Dispositions    · Return for 1/8/20 at 12:10pm.               Copy sent to:  Geovani James MD      Lab follow up: 11/22/19  Component      Latest Ref Rng & Units 9/12/2019 9/12/2019 9/12/2019           4:50 PM  4:50 PM  4:50 PM   Cholesterol, total      100 - 199 mg/dL 99 (L)     Triglyceride      0 - 149 mg/dL 136     HDL Cholesterol      >39 mg/dL 39 (L)     VLDL, calculated      5 - 40 mg/dL 27     LDL, calculated      0 - 99 mg/dL 33     Hemoglobin A1c, (calculated)      4.8 - 5.6 %  7.7 (H)    Estimated average glucose      mg/dL  174    T4, Free      0.82 - 1.77 ng/dL   1.90 (H)     Sent her daughter the following message through [employer]:  Hemoglobin A1c is a 3 month marker of your diabetes control. Goal is less than 8% so your current value is at goal and no changes are needed at this time. -------------------------------------------------------------------------------------------------------------------  Total Cholesterol is the total number of cholesterol particles in your blood. Goal is less than 200. Triglycerides are the short term fats in your blood. Goal is less than 150. HDL is the good cholesterol in your blood. Goal is more than 50 if you are a woman and 40 if you are a man. LDL is the bad cholesterol in your blood. Goal is less than 100 unless you have heart disease and then goal is under 70. All of your values are normal.    Continue to follow a low cholesterol diet. Try to limit the amount of fried foods, fatty foods, butter, gravy, red meat, ice cream, cheese, and eggs in your diet, which are all high in cholesterol.  -------------------------------------------------------------------------------------------------------------------  Your free T4 (thyroid test) was slightly high but previously was normal so I will keep your dose of levothyroxine the same until you come back in January.

## 2019-09-12 NOTE — ED NOTES
Family used pts Lifestyle Lilibeth to check pts BG and it is currently 160. Pt also denying headache at this time.

## 2019-09-12 NOTE — ED PROVIDER NOTES
78 y.o. female with past medical history significant for DM, adrenal insufficiency, HTN, GERD, hypothyroidism, stroke, Izard's disease, and cushing disease who presents from [facility] with chief complaint of headache. Pt complains of a frontal headache that started \"not long ago\" with associated nausea. Pt repeatedly states \"it hurts\". Per daughter, the pt had a blood glucose that read \"low\" this morning. Daughter states she was taking the pt to a doctors appointment when she had onset of the headache in the car. Daughter states the headache has progressively worsened. Daughter notes the pt was recently admitted in 08/2019 for an UTI at [address] [place] [address] and was treated with IV antibiotics. Pt denies vomiting, or chest pain. There are no other acute medical concerns at this time. Full history, physical exam, and ROS unable to be obtained due to:  dementia. Social hx: Never Smoker. Denies EtOH Use. Lives at [facility]. PCP: Sergio Burnette MD    Note written by Gnéesis Jennings. Blayne Rust, as dictated by Gracelyn Cockayne, DO 10:54 AM      The history is provided by the patient and medical records.         Past Medical History:   Diagnosis Date    Arthritis osteoporosis    Cataract     Diabetes ([facility]     Endocrine disease 1972    adrenal insufficiency - bilat adrenalectomy    GERD (gastroesophageal reflux disease)     H/O Cushing disease     Hypertension     Other ill-defined conditions(799.89)     elizabeth's disease    Stroke ([facility] [facility] 2016    TIA 2011 & L occipial stroke 4/2016    Thyroid disease hypothyroid    UTI (lower urinary tract infection)        Past Surgical History:   Procedure Laterality Date    ENDOCRINE SURGERY PROC UNLISTED Bilateral 1972    bilat adrenalectomy in 1972    HX APPENDECTOMY      HX CATARACT REMOVAL Left     ON the left    HX CHOLECYSTECTOMY      HX CHOLECYSTECTOMY  2000    HX HYSTERECTOMY      HX TONSILLECTOMY           Family History:   Problem Relation Age of Onset    Cancer Mother     Diabetes Mother     Stroke Mother     Psychiatric Disorder Mother     Dementia Mother     Headache Mother     Heart Disease Father     Psychiatric Disorder Father     Stroke Father     Asthma Sister     Diabetes Sister     Asthma Brother     Heart Disease Brother        Social History     Socioeconomic History    Marital status:      Spouse name: Not on file    Number of children: Not on file    Years of education: Not on file    Highest education level: Not on file   Occupational History    Not on file   Social Needs    Financial resource strain: Not on file    Food insecurity:     Worry: Not on file     Inability: Not on file    Transportation needs:     Medical: Not on file     Non-medical: Not on file   Tobacco Use    Smoking status: Never Smoker    Smokeless tobacco: Never Used   Substance and Sexual Activity    Alcohol use: No    Drug use: No    Sexual activity: Not Currently   Lifestyle    Physical activity:     Days per week: Not on file     Minutes per session: Not on file    Stress: Not on file   Relationships    Social connections:     Talks on phone: Not on file     Gets together: Not on file     Attends Christian service: Not on file     Active member of club or organization: Not on file     Attends meetings of clubs or organizations: Not on file     Relationship status: Not on file    Intimate partner violence:     Fear of current or ex partner: Not on file     Emotionally abused: Not on file     Physically abused: Not on file     Forced sexual activity: Not on file   Other Topics Concern    Not on file   Social History Narrative    Not on file         ALLERGIES: Amoxicillin and Erythromycin    Review of Systems   Unable to perform ROS: Dementia       Vitals:    09/12/19 1030   BP: 121/61   Pulse: 76   Resp: 28   SpO2: 98%            Physical Exam   Constitutional: She appears well-developed and well-nourished.  She appears distressed. HENT:   Head: Normocephalic and atraumatic. Right Ear: External ear normal.   Left Ear: External ear normal.   Mouth/Throat: Oropharynx is clear and moist.   Eyes: Pupils are equal, round, and reactive to light. Conjunctivae and EOM are normal. No scleral icterus. Neck: Neck supple. No JVD present. No tracheal deviation present. Cardiovascular: Normal rate and regular rhythm. Exam reveals no gallop. No murmur heard. Pulmonary/Chest: Effort normal and breath sounds normal. No respiratory distress. Abdominal: Soft. Bowel sounds are normal. She exhibits no distension. There is no tenderness. Musculoskeletal: Normal range of motion. She exhibits no edema, tenderness or deformity. Neurological: She is alert. Oriented to person, and place. Not oriented to time. Patient unable to fully participate in a neurological exam.  Intermittent full body tremor, not seizure activity. Skin: Skin is warm and dry. Capillary refill takes less than 2 seconds. No rash noted. Note written by Nicole Bourne. Levon Liner, as dictated by Maryjo Mejía, DO 10:55 AM      MDM  Number of Diagnoses or Management Options  Nonintractable headache, unspecified chronicity pattern, unspecified headache type:       Andrade Odell is a 78-year-old female comes in as above. Imaging and labs are as above. Here, upon arrival, patient was unable to provide any information. She is only pointing at her head stating \"hurts\" over and over and over again. She would have intermittent episodes of being calm and collected before she would start these episodes again. CT and labs and urine were ordered which are reviewed and are as above. On multiple re-evaluations, the patient had returned to her baseline and was not having any complaints. She was awake alert oriented talking not complaining of anything asking to go home.   This time the cause of her discomfort earlier is unknown other than the fact that her daughter said that earlier her sugar was low and that they \"loaded her foot sugar\" her sugar back up and that may be it was somehow related to this. At this time, patient is agreeable to follow-up as an outpatient. Daughter who is at bedside is agreeable to this plan. All questions answered. Procedures    1:10 PM  Patient's results have been reviewed with them. Patient and/or family have verbally conveyed their understanding and agreement of the patient's signs, symptoms, diagnosis, treatment and prognosis and additionally agree to follow up as recommended or return to the Emergency Room should their condition change prior to follow-up. Discharge instructions have also been provided to the patient with some educational information regarding their diagnosis as well a list of reasons why they would want to return to the ER prior to their follow-up appointment should their condition change.

## 2019-09-12 NOTE — TELEPHONE ENCOUNTER
I spoke with [facility] Director and he stated that the med list and sugar readings will be faxed.

## 2019-09-12 NOTE — PATIENT INSTRUCTIONS
1) I will obtain the list of meds from your facility and make any adjustments to your insulin doses based on the readings they provide. 2) I will send you a message through [employer] to your daughter with your lab results.

## 2019-09-12 NOTE — ED TRIAGE NOTES
Pt arrives to triage shaking and not answering questions , pt points to head and stated it hurts, unsure when it started, denies falls, unable to obtain any other information from pt

## 2019-09-12 NOTE — ED NOTES
Patient given discharge instructions and verbalized understanding. Daughter escorted pt out to drive her home.

## 2019-09-18 NOTE — TELEPHONE ENCOUNTER
Lianne from [facility]  Call today stating that she held pt medication this morning her blood sugar was 51 this morning and will recheck it at lunch time you can call Kelsi Ramirez [phone]

## 2019-09-18 NOTE — TELEPHONE ENCOUNTER
Returned call to Ganga Diaz at [facility]. She was informed that Ms. Colton Fam has an endocrinologist and encouraged to contact his office. Contact information provided for him.     María Carlton   Physician   Specialty   Endocrinology     Primary Contact Information   Phone Fax E-mail Address   [phone] Not available [address] [facility] [address] [facility]    [address] [id number]

## 2019-11-08 NOTE — TELEPHONE ENCOUNTER
----- Message from Freddie Giang sent at 11/8/2019  2:15 PM EST -----  Regarding: Dr. Jack Saravia Message/Vendor Calls    Caller's first and last name:[facility]       Reason for call: Pt's assisting living sent a request for the Pt to have a testing for a UTI overnight to the wrong fax. They need a order to proceed.       Callback required yes/no and why:Y      Best contact number(s):[phone]      Details to clarify the request:      Channel Roxine Rafa

## 2019-11-08 NOTE — TELEPHONE ENCOUNTER
Verbal order given to [facility] for urinalysis with urine culture. She was able to read back order and had no further questions.

## 2019-11-08 NOTE — TELEPHONE ENCOUNTER
Spoke with nurse Thong Jiménez at [facility]. She states she works in a different unit, but to her understanding patient is having \"some behaviors\", being aggressive. Patient has exhibited same behaviors before with UTI. Family/facilty is requesting test for UTI.

## 2019-11-12 NOTE — TELEPHONE ENCOUNTER
----- Message from Verna & Annalise sent at 11/12/2019 11:28 AM EST -----  Regarding: Dr. Vladislav Lopez: Geeksphone Session, with [facility], states pt has bite marks on her shoulders and would like a cream for her.

## 2019-11-14 NOTE — TELEPHONE ENCOUNTER
Call to [facility] and spoke with nurse, Luna Reece. Krista was not willing to assist or review if a urine culture was collected and transferred line to [facility]. Nurse called back and spoke with Zaki Marquez, director of nursing, and questioned if a urine culture was previously collected. Zaki Marquez states \"I think. I believe one was done. 9x out of 10, we collect a urine with C&S. It takes a couple days to get it after a urinalysis is done. I have morning meetings, so I'll write this down and call you back later. \" The line was then disconnected. Nurse unable to provide either person a verbal order with med treatment orders. Will fax written order by PCP to [facility].

## 2019-11-15 NOTE — TELEPHONE ENCOUNTER
Last refill- 5/23/19 for 20 mg, I do not see where this medication was ever increased. I see it was discontinued by Dr. Lauryn Moon at 20mg and added back to chart. Last office visit - 5/23/19  Next office visit -   Future Appointments   Date Time Provider Luigi Fultoni   11/20/2019 12:00 PM Anu Meehan MD [facility] SCHED   12/5/2019  1:20 PM Anu Meehan MD [id number] [facility]   1/8/2020 12:10 PM Shelton Chapa MD RDE [facility] [address]         Requested Prescriptions     Pending Prescriptions Disp Refills    cranberry extract (THERACRAN HP FOR KIDS) 50 mg chew 60 Tab 2     Sig: Take 1 Tab by mouth two (2) times a day.  lisinopril (PRINIVIL, ZESTRIL) 40 mg tablet 90 Tab 1     Sig: Take 1 Tab by mouth daily.  Take 1 tab daily

## 2019-11-15 NOTE — TELEPHONE ENCOUNTER
Attempted to reach nurse at [facility] to clarify which dosage patient is taking and told to call back in 20 minutes due to med tech being on break. Call back again to advise of importance of needing to speak with someone to clarify dosage and was told no one was available to help and call back.

## 2019-11-15 NOTE — TELEPHONE ENCOUNTER
It looks like Dr. Garima Mike had noted the increased dose lisinopril to 40mg at her visit with him on 9/12/19

## 2019-12-04 NOTE — PROGRESS NOTES
Subjective:  
  
Kenyatta Marquez is a 78 y.o. female who presents today for follow up of her dementia  hyperlipidemia  hypertension She is following with Dr. Rachel Aiken for her diabetes mellitus type 2, adrenal insufficiency and central hypothyroid. Last visit was in 9/2019. All labs at that time were stable. No medication adjustments were made. She is here with her attendant, Alvaro Talavera. She lives at [facility]. They did not bring in a list of her medications. No new concerns to report. She is using the Roovyn system for glucose monitoring, but patient had been taking of the skin monitor periodically. She is using a cane for ambulation. Her appetite is good. She has a dining castle that provides her meals. She is also due for her Medicare Wellness exam today. Patient Active Problem List  
Diagnosis Code  TIA (transient ischemic attack) G45.9  Essential hypertension, benign I10  
 RLS (restless legs syndrome) G25.81  
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  
 History of mammography, screening Z92.89  
 Colon cancer screening Z12.11 Current Outpatient Medications Medication Sig Dispense Refill  cranberry extract 50 mg chew 1 tab bid 180 Tab 0  
 lisinopril (PRINIVIL, ZESTRIL) 40 mg tablet Take 1 Tab by mouth daily. Take 1 tab daily 90 Tab 0  
 fludrocortisone (FLORINEF) 0.1 mg tablet Take 1 Tab by mouth daily.  30 Tab 11  
 TRESIBA FLEXTOUCH U-100 100 unit/mL (3 mL) inpn INJECT 30 UNITS SUB-Q DAILY (DM) 15 mL 11  
  Insulin Needles, Disposable, (BD ULTRA-FINE MINI PEN NEEDLE) 31 gauge x 3/16\" ndle USE TO INJECT WITH TRESIBA AND VICTOZA AS DIRECTED 180 Pen Needle 3  
 loperamide (IMODIUM) 2 mg capsule 1 tab at onset of diarrhea, then 1 tab after each loose stool. Max dose of 4 tabs in 24 hours. Indications: diarrhea 30 Cap 3  
 ascorbic acid, vitamin C, (VITAMIN C) 500 mg tablet Take 500 mg by mouth daily.  clopidogrel (PLAVIX) 75 mg tab Take 75 mg by mouth daily.  ergocalciferol (VITAMIN D2) 50,000 unit capsule Take 50,000 Units by mouth two (2) days a week. On Sundays and Thursdays  acetaminophen (TYLENOL) 500 mg tablet Take  by mouth every six (6) hours as needed for Pain.  diphenhydrAMINE (BENADRYL ALLERGY) 25 mg tablet Take 25 mg by mouth every six (6) hours as needed.  lactobacillus rhamnosus gg 10 billion cell (CULTURELLE) 10 billion cell capsule Take 1 Cap by mouth daily. 180 Cap 1  
 MYRBETRIQ 50 mg ER tablet Take 1 tab daily  11  
 metFORMIN (GLUCOPHAGE) 500 mg tablet Take 1 tab twice daily  0  
 potassium chloride SR (KLOR-CON 10) 10 mEq tablet Take 1 tab twice daily  0  
 atorvastatin (LIPITOR) 80 mg tablet TAKE ONE TABLET BY MOUTH EVERY DAY 90 Tab 1  
 hydrocortisone (CORTEF) 5 mg tablet TAKE 3 TABLETS BY MOUTH EVERY MORNING AND 1 TABLET EVERY EVENING 120 Tab 0  
 flash glucose sensor (FREESTYLE EMI 14 DAY SENSOR) kit Use to check blood sugars. Change site every 14 days. 2 Kit 0  
 levothyroxine (SYNTHROID) 112 mcg tablet Take 1 Tab by mouth Daily (before breakfast). 90 Tab 3  
 aspirin 81 mg chewable tablet Take 1 Tab by mouth daily. 100 Tab 3  
 escitalopram oxalate (LEXAPRO) 10 mg tablet Take 1 Tab by mouth daily. Indications: major depressive disorder 90 Tab 1  
 amLODIPine (NORVASC) 5 mg tablet TAKE ONE TABLET BY MOUTH EVERY DAY 90 Tab 1  
 furosemide (LASIX) 40 mg tablet Take 1 Tab by mouth daily.  90 Tab 3  
  VICTOZA 3-EDGARDO 0.6 mg/0.1 mL (18 mg/3 mL) pnij 1.8 mg by SubCUTAneous route daily. 3 Pen 11  
 insulin lispro (HUMALOG KWIKPEN INSULIN) 100 unit/mL kwikpen Inject 10 units TIDcc, add correction if pre-meal glucose if over 200: 
 200-249: 2 units 250-299: 4 units 300-349: 6 units 350-399: 8 units Over 400: 10 units and notify me 5 Pen 3  
 flash glucose scanning reader ([employer] CRE EMI 14 DAY READER) [facility] Use to check blood sugars. Change site every 14 days. 2 Each 0 Review of Systems Pertinent items are noted in HPI. Objective:  
 
Visit Vitals /70 (BP 1 Location: Left arm, BP Patient Position: Sitting) Pulse 74 Temp 98.2 °F (36.8 °C) (Oral) Resp 16 Ht 5' 2\" (1.575 m) Wt 166 lb (75.3 kg) SpO2 98% BMI 30.36 kg/m² General appearance: alert, cooperative, no distress, appears stated age Head: Normocephalic, without obvious abnormality, atraumatic Lungs: clear to auscultation bilaterally Heart: regular rate and rhythm, S1, S2 normal, no murmur, click, rub or gallop Extremities: extremities normal, atraumatic, no cyanosis or edema Pulses: 2+ and symmetric Assessment/Plan: 1. Benign hypertension 
-blood pressure is controlled with current dose of lisinopril. 2. Mixed hyperlipidemia 
-last fasting lipid panel done 9/2019. Reviewed. Controlled. 3. Dementia without behavioral disturbance, unspecified dementia type ([facility] [address] -remains stable. Has supportive care. 4. Encounter for long-term (current) use of medications 5. Medicare annual wellness visit, subsequent - [place] ANNU[place] ALCO[place] SCREEN 15 MIN 
- [address] 6. Screening for alcoholism - [place] ANNU[place] ALCO[place] SCREEN 15 MIN 
 
7. Screening for depression 
 
- [address] 8. Follows closely with Dr. Kourtney Vila, endocrine, for her diabetes mellitus, central hypothyroid and adrenal insufficiency. Follow-up Disposition:  
 
Follow up in 6 months Return if symptoms worsen or fail to improve. Advised patient to call back or return to office if symptoms worsen/change/persist.  
 
Discussed expected course/resolution/complications of diagnosis in detail with patient. Medication risks/benefits/costs/interactions/alternatives discussed with patient. Patient was given an after visit summary which includes diagnoses, current medications, & vitals. Patient expressed understanding with the diagnosis and plan. This is the Subsequent Medicare Annual Wellness Exam, performed 12 months or more after the Initial AWV or the last Subsequent AWV I have reviewed the patient's medical history in detail and updated the computerized patient record. History Patient Active Problem List  
Diagnosis Code  TIA (transient ischemic attack) G45.9  Essential hypertension, benign I10  
 RLS (restless legs syndrome) G25.81  
 Cushing disease (HCC) E24.0  Depression F32.9  Elevated cholesterol E78.00  Vitamin D deficiency E55.9  Adrenal insufficiency (HCC) E27.40  Hypothyroidism E03.9  Meningioma ([facility] [address] D32.9  Cerebral infarction due to stenosis of left posterior cerebral artery (HCC) O42.784  Type 2 diabetes mellitus with stage 3 chronic kidney disease, with long-term current use of insulin ([facility]) E11.22, N18.3, Z79.4  Dementia ([facility] [address] F03.90  
 History of mammography, screening Z92.89  
 Colon cancer screening Z12.11 Past Medical History:  
Diagnosis Date  Arthritis osteoporosis  Cataract  Diabetes ([address]  Endocrine disease 1972  
 adrenal insufficiency - bilat adrenalectomy  GERD (gastroesophageal reflux disease)  H/O Cushing disease  Hypertension  Other ill-defined conditions(799.89)   
 elizabeth's disease  Stroke [facility]) 2016 TIA 2011 & L occipial stroke 4/2016  Thyroid disease hypothyroid  UTI (lower urinary tract infection) Past Surgical History:  
Procedure Laterality Date  ENDOCRINE SURGERY PROC UNLISTED Bilateral 1972  
 bilat adrenalectomy in 1972  HX APPENDECTOMY  HX CATARACT REMOVAL Left ON the left  HX CHOLECYSTECTOMY [place]  HX HYSTERECTOMY  HX TONSILLECTOMY Current Outpatient Medications Medication Sig Dispense Refill  cranberry extract 50 mg chew 1 tab bid 180 Tab 0  
 lisinopril (PRINIVIL, ZESTRIL) 40 mg tablet Take 1 Tab by mouth daily. Take 1 tab daily 90 Tab 0  
 fludrocortisone (FLORINEF) 0.1 mg tablet Take 1 Tab by mouth daily. 30 Tab 11  
 TRESIBA FLEXTOUCH U-100 100 unit/mL (3 mL) inpn INJECT 30 UNITS SUB-Q DAILY (DM) 15 mL 11  
 Insulin Needles, Disposable, (BD ULTRA-FINE MINI PEN NEEDLE) 31 gauge x 3/16\" ndle USE TO INJECT WITH TRESIBA AND VICTOZA AS DIRECTED 180 Pen Needle 3  
 loperamide (IMODIUM) 2 mg capsule 1 tab at onset of diarrhea, then 1 tab after each loose stool. Max dose of 4 tabs in 24 hours. Indications: diarrhea 30 Cap 3  
 ascorbic acid, vitamin C, (VITAMIN C) 500 mg tablet Take 500 mg by mouth daily.  clopidogrel (PLAVIX) 75 mg tab Take 75 mg by mouth daily.  ergocalciferol (VITAMIN D2) 50,000 unit capsule Take 50,000 Units by mouth two (2) days a week. On Sundays and Thursdays  acetaminophen (TYLENOL) 500 mg tablet Take  by mouth every six (6) hours as needed for Pain.  diphenhydrAMINE (BENADRYL ALLERGY) 25 mg tablet Take 25 mg by mouth every six (6) hours as needed.  lactobacillus rhamnosus gg 10 billion cell (CULTURELLE) 10 billion cell capsule Take 1 Cap by mouth daily.  180 Cap 1  
 MYRBETRIQ 50 mg ER tablet Take 1 tab daily  11  
 metFORMIN (GLUCOPHAGE) 500 mg tablet Take 1 tab twice daily  0  
 potassium chloride SR (KLOR-CON 10) 10 mEq tablet Take 1 tab twice daily  0  
 atorvastatin (LIPITOR) 80 mg tablet TAKE ONE TABLET BY MOUTH EVERY DAY 90 Tab 1  
 hydrocortisone (CORTEF) 5 mg tablet TAKE 3 TABLETS BY MOUTH EVERY MORNING AND 1 TABLET EVERY EVENING 120 Tab 0  
 flash glucose sensor (FREESTYLE EMI 14 DAY SENSOR) kit Use to check blood sugars. Change site every 14 days. 2 Kit 0  
 levothyroxine (SYNTHROID) 112 mcg tablet Take 1 Tab by mouth Daily (before breakfast). 90 Tab 3  
 aspirin 81 mg chewable tablet Take 1 Tab by mouth daily. 100 Tab 3  
 escitalopram oxalate (LEXAPRO) 10 mg tablet Take 1 Tab by mouth daily. Indications: major depressive disorder 90 Tab 1  
 amLODIPine (NORVASC) 5 mg tablet TAKE ONE TABLET BY MOUTH EVERY DAY 90 Tab 1  
 furosemide (LASIX) 40 mg tablet Take 1 Tab by mouth daily. 90 Tab 3  VICTOZA 3-EDGARDO 0.6 mg/0.1 mL (18 mg/3 mL) pnij 1.8 mg by SubCUTAneous route daily. 3 Pen 11  
 insulin lispro (HUMALOG KWIKPEN INSULIN) 100 unit/mL kwikpen Inject 10 units TIDcc, add correction if pre-meal glucose if over 200: 
 200-249: 2 units 250-299: 4 units 300-349: 6 units 350-399: 8 units Over 400: 10 units and notify me 5 Pen 3  
 flash glucose scanning reader (FREESTYLE EMI 14 DAY READER) misc Use to check blood sugars. Change site every 14 days. 2 Each 0 Allergies Allergen Reactions  Amoxicillin Unknown (comments)  Erythromycin Rash and Unknown (comments) fever Family History Problem Relation Age of Onset  Cancer Mother  Diabetes Mother  Stroke Mother  Psychiatric Disorder Mother  Dementia Mother  Headache Mother  Heart Disease Father  Psychiatric Disorder Father  Stroke Father  Asthma Sister  Diabetes Sister  Asthma Brother  Heart Disease Brother Social History Tobacco Use  Smoking status: Never Smoker  Smokeless tobacco: Never Used Substance Use Topics  Alcohol use: No  
 
 
Depression Risk Factor Screening:  
 
3 most recent PHQ Screens 4/26/2018 Little interest or pleasure in doing things Not at all Feeling down, depressed, irritable, or hopeless Not at all Total Score PHQ 2 0  
 
 
 Alcohol Risk Factor Screening: Do you average 1 drink per night or more than 7 drinks a week:  No 
 
On any one occasion in the past three months have you have had more than 3 drinks containing alcohol:  No 
 
 
Functional Ability and Level of Safety:  
Hearing: The patient wears hearing aids. Activities of Daily Living: The home contains: handrails and grab bars. Lives in a memory care unit at [facility] Patient needs help with:  transportation, shopping, preparing meals, laundry, housework, managing medications, managing money and hygiene Ambulation: with mild difficulty Fall Risk: 
Fall Risk Assessment, last 12 mths 5/23/2019 Able to walk? Yes Fall in past 12 months? No  
 
 
Abuse Screen: 
Patient is not abused Cognitive Screening Has your family/caregiver stated any concerns about your memory: has known memory impairment Patient Care Team  
Patient Care Team: 
Kevon Harris MD as PCP - General (Internal Medicine) Kevon Harris MD as PCP - [facility] Empaneled Provider Marci Timmons MD as Consulting Provider (Endocrinology) Assessment/Plan Education and counseling provided: 
Are appropriate based on today's review and evaluation Diagnoses and all orders for this visit: 
 
1. Medicare annual wellness visit, subsequent -     [place] ANNUAL ALCO[place] SCREEN 15 MIN 
-     [address] 2. Benign hypertension 3. Mixed hyperlipidemia 4. Dementia without behavioral disturbance, unspecified dementia type ([facility] [address] 5. Encounter for long-term (current) use of medications 6. Screening for alcoholism -     [place] ANNUAL ALCOH[place] SCREEN 15 MIN 
 
7. Screening for depression 
-     [address]

## 2019-12-05 NOTE — PATIENT INSTRUCTIONS
Medicare Wellness Visit, Female The best way to live healthy is to have a lifestyle where you eat a well-balanced diet, exercise regularly, limit alcohol use, and quit all forms of tobacco/nicotine, if applicable. Regular preventive services are another way to keep healthy. Preventive services (vaccines, screening tests, monitoring & exams) can help personalize your care plan, which helps you manage your own care. Screening tests can find health problems at the earliest stages, when they are easiest to treat. [facility] follows the current, evidence-based guidelines published by the [facility] ([facility]) when recommending preventive services for our patients. Because we follow these guidelines, sometimes recommendations change over time as research supports it. (For example, mammograms used to be recommended annually. Even though Medicare will still pay for an annual mammogram, the newer guidelines recommend a mammogram every two years for women of average risk). Of course, you and your doctor may decide to screen more often for some diseases, based on your risk and your co-morbidities (chronic disease you are already diagnosed with). Preventive services for you include: - Medicare offers their members a free annual wellness visit, which is time for you and your primary care provider to discuss and plan for your preventive service needs. Take advantage of this benefit every year! 
-All adults over the age of 72 should receive the recommended pneumonia vaccines. Current USPSTF guidelines recommend a series of two vaccines for the best pneumonia protection.  
-All adults should have a flu vaccine yearly and a tetanus vaccine every 10 years.  
-All adults age 48 and older should receive the shingles vaccines (series of two vaccines). -All adults age 38-68 who are overweight should have a diabetes screening test once every three years. -All adults born between [birth year] and 1965 should be screened once for Hepatitis C. 
-Other screening tests and preventive services for persons with diabetes include: an eye exam to screen for diabetic retinopathy, a kidney function test, a foot exam, and stricter control over your cholesterol.  
-Cardiovascular screening for adults with routine risk involves an electrocardiogram (ECG) at intervals determined by your doctor.  
-Colorectal cancer screenings should be done for adults age 54-65 with no increased risk factors for colorectal cancer. There are a number of acceptable methods of screening for this type of cancer. Each test has its own benefits and drawbacks. Discuss with your doctor what is most appropriate for you during your annual wellness visit. The different tests include: colonoscopy (considered the best screening method), a fecal occult blood test, a fecal DNA test, and sigmoidoscopy. 
 
-A bone mass density test is recommended when a woman turns 65 to screen for osteoporosis. This test is only recommended one time, as a screening. Some providers will use this same test as a disease monitoring tool if you already have osteoporosis. -Breast cancer screenings are recommended every other year for women of normal risk, age 54-69. 
-Cervical cancer screenings for women over age 72 are only recommended with certain risk factors. Here is a list of your current Health Maintenance items (your personalized list of preventive services) with a due date: 
Health Maintenance Due Topic Date Due  
 Flu Vaccine  08/01/2019

## 2019-12-05 NOTE — TELEPHONE ENCOUNTER
----- Message from Jana Odonnell sent at 12/4/2019  4:07 PM EST -----  Regarding: Dr. Mobley/ Telephone/ Refill  Contact: [phone]  Patient is needing a refill on her all of her medications to be sent to [facility]. [facility] has been trying to request refills with no response. Please follow up with Leanne Pierce, at [facility]. Patient is almost out of her medications.

## 2019-12-05 NOTE — TELEPHONE ENCOUNTER
Pt has an appointment scheduled in office today with PCP. Medication refills can be addressed at that time.

## 2019-12-05 NOTE — PROGRESS NOTES
Verified name and birth date for privacy precautions. Chart reviewed in preparation for today's visit. Chief Complaint Patient presents with  Hypertension  
  follow up, not fasting Health Maintenance Due Topic  Shingrix Vaccine Age 50> (1 of 2)  Pneumococcal 65+ years (2 of 2 - PPSV23)  FOOT EXAM Q1   
 MEDICARE YEARLY EXAM   
 Influenza Age 5 to Adult Wt Readings from Last 3 Encounters:  
12/05/19 166 lb (75.3 kg) 09/12/19 159 lb 3.2 oz (72.2 kg) 05/23/19 161 lb (73 kg) Temp Readings from Last 3 Encounters:  
12/05/19 98.2 °F (36.8 °C) (Oral) 09/12/19 97.5 °F (36.4 °C)  
05/23/19 97.9 °F (36.6 °C) (Oral) BP Readings from Last 3 Encounters:  
12/05/19 132/70  
09/12/19 145/60  
09/12/19 112/46 Pulse Readings from Last 3 Encounters:  
12/05/19 74  
09/12/19 60  
09/12/19 73 Learning Assessment: 
:  
 
Learning Assessment 2/7/2017 PRIMARY LEARNER Patient HIGHEST LEVEL OF EDUCATION - PRIMARY LEARNER  > 4 YEARS OF COLLEGE  
BARRIERS PRIMARY LEARNER NONE PRIMARY LANGUAGE ENGLISH  
LEARNER PREFERENCE PRIMARY LISTENING  
ANSWERED BY patient RELATIONSHIP SELF Depression Screening: 
:  
 
3 most recent PHQ Screens 4/26/2018 Little interest or pleasure in doing things Not at all Feeling down, depressed, irritable, or hopeless Not at all Total Score PHQ 2 0 Fall Risk Assessment: 
:  
 
Fall Risk Assessment, last 12 mths 5/23/2019 Able to walk? Yes Fall in past 12 months? No  
 
 
Abuse Screening: 
:  
 
Abuse Screening Questionnaire 4/18/2017 Do you ever feel afraid of your partner? Venkat Lek Are you in a relationship with someone who physically or mentally threatens you? Venkat Lek Is it safe for you to go home? Tobias Najera Coordination of Care Questionnaire: 
:  
 
1) Have you been to an emergency room, urgent care clinic since your last visit? no  
Hospitalized since your last visit? no          
 
 2) Have you seen or consulted any other health care providers outside of [address] [place] [address] since your last visit? no  (Include any pap smears or colon screenings in this section.) 3) Do you have an Advance Directive on file? yes Patient is currently accompanied by her adult caretaker & I have received verbal consent from Nicole Keyes to discuss any/all medical information while he/she is present in the room.  
 
------------------------------------------------

## 2019-12-06 NOTE — TELEPHONE ENCOUNTER
Received incoming call from William Gaming, Med tech, and Julio Morales, Assistant Director of Nursing regarding refill request of medications. Krista Garcia were able to verify the medications pt is currently taking or who originally prescribed medications. Nurse encouraged them to send a current list of all medications to our office for MD to review. They requested MD to refill pt's DM medications and nurse advised them to request insulin medication, supplies, and metformin from pt's endocrinologist.     Leonides Obregon states she would be sending the office a list of all pt medications for the provider to sign which [facility] from [address] [place] [address] for 6 months without a providers prescription/ signature. Medication list given to PCP for further review.

## 2019-12-06 NOTE — TELEPHONE ENCOUNTER
Medication from [facility] reviewed. Medication list in chart has been reconciled with list provided by [facility]. I refilled the scripts that I provide. All scripts sent to [facility]. Copy of her medications faxed back to [facility] to update their information.

## 2019-12-06 NOTE — LETTER
12/6/2019 4:19 PM 
 
RE: Ms. Leonid Todd [id number] [facility] [address] [id number] The following are the medications that Ms. Inder Clifford should be receiving. Her endocrinologist is Dr. Mumtaz Coleman. Current Outpatient Medications:  
  lisinopril (PRINIVIL, ZESTRIL) 40 mg tablet, Take 1 Tab by mouth daily. Take 1 tab daily,  
 
  clopidogrel (PLAVIX) 75 mg tab, Take 1 Tab by mouth daily. , 
 
  MYRBETRIQ 50 mg ER tablet, Take 1 Tab by mouth daily. Take 1 tab daily,  
 
  potassium chloride SR (KLOR-CON 10) 10 mEq tablet, Take 1 Tab by mouth two (2) times a day. Take 1 tab twice daily,  
 
  atorvastatin (LIPITOR) 80 mg tablet, TAKE ONE TABLET BY MOUTH EVERY DAY,  
 
  levothyroxine (SYNTHROID) 112 mcg tablet, Take 1 Tab by mouth Daily (before breakfast). ,  
 
  escitalopram oxalate (LEXAPRO) 10 mg tablet, Take 1 Tab by mouth daily. Indications: major depressive disorder,  
 
  amLODIPine (NORVASC) 5 mg tablet, TAKE ONE TABLET BY MOUTH EVERY DAY,  
 
  furosemide (LASIX) 40 mg tablet, Take 1 Tab by mouth daily. ,  
 
  cranberry extract 50 mg chew, 1 tab bid,  
 
  fludrocortisone (FLORINEF) 0.1 mg tablet, Take 1 Tab by mouth daily. ,  
 
  TRESIBA FLEXTOUCH U-100 100 unit/mL (3 mL) inpn, INJECT 30 UNITS SUB-Q DAILY (DM),  
 
  Insulin Needles, Disposable, (BD ULTRA-FINE MINI PEN NEEDLE) 31 gauge x 3/16\" ndle, USE TO INJECT WITH TRESIBA AND VICTOZA AS DIRECTED,  
 
  loperamide (IMODIUM) 2 mg capsule, 1 tab at onset of diarrhea, then 1 tab after each loose stool. Max dose of 4 tabs in 24 hours. Indications: diarrhea,  
 
  ascorbic acid, vitamin C, (VITAMIN C) 500 mg tablet, Take 500 mg by mouth daily. ,  
 
  acetaminophen (TYLENOL) 500 mg tablet, Take  by mouth every six (6) hours as needed for Pain.,  
  
  diphenhydrAMINE (BENADRYL ALLERGY) 25 mg tablet, Take 25 mg by mouth every six (6) hours as needed.  
 
  metFORMIN (GLUCOPHAGE) 500 mg tablet, Take 1 tab twice daily,  
 
   hydrocortisone (CORTEF) 5 mg tablet, TAKE 3 TABLETS BY MOUTH EVERY MORNING AND 1 TABLET EVERY EVENING,  
 
  flash glucose sensor (FREESTYLE EMI 14 DAY SENSOR) kit, Use to check blood sugars. Change site every 14 days. , 
 
 
  VICTOZA 3-EDGARDO 0.6 mg/0.1 mL (18 mg/3 mL) pnij, 1.8 mg by SubCUTAneous route daily. ,  
 
  insulin lispro (HUMALOG KWIKPEN INSULIN) 100 unit/mL kwikpen, Inject 10 units TIDcc, add correction if pre-meal glucose if over 200:  200-249: 2 units  250-299: 4 units   300-349: 6 units  350-399: 8 units  Over 400: 10 units and notify me, Disp: 5 Pen, Rfl: 3 
 
  flash glucose scanning reader (FREESTYLE EMI 14 DAY READER) misc, Use to check blood sugars. Change site every 14 days. , Disp: 2 Each, Rfl: 0

## 2020-01-01 ENCOUNTER — TELEPHONE (OUTPATIENT)
Dept: OTHER | Age: 80
End: 2020-01-01

## 2020-01-01 ENCOUNTER — TELEPHONE (OUTPATIENT)
Dept: INTERNAL MEDICINE CLINIC | Age: 80
End: 2020-01-01

## 2020-01-01 ENCOUNTER — APPOINTMENT (OUTPATIENT)
Dept: GENERAL RADIOLOGY | Age: 80
End: 2020-01-01
Attending: EMERGENCY MEDICINE
Payer: MEDICARE

## 2020-01-01 ENCOUNTER — OFFICE VISIT (OUTPATIENT)
Dept: ENDOCRINOLOGY | Age: 80
End: 2020-01-01

## 2020-01-01 ENCOUNTER — HOSPITAL ENCOUNTER (EMERGENCY)
Age: 80
Discharge: HOME | End: 2020-06-01
Attending: EMERGENCY MEDICINE | Admitting: EMERGENCY MEDICINE
Payer: MEDICARE

## 2020-01-01 ENCOUNTER — TELEPHONE (OUTPATIENT)
Dept: ENDOCRINOLOGY | Age: 80
End: 2020-01-01

## 2020-01-01 ENCOUNTER — PATIENT OUTREACH (OUTPATIENT)
Dept: INTERNAL MEDICINE CLINIC | Age: 80
End: 2020-01-01

## 2020-01-01 ENCOUNTER — VIRTUAL VISIT (OUTPATIENT)
Dept: ENDOCRINOLOGY | Age: 80
End: 2020-01-01

## 2020-01-01 ENCOUNTER — APPOINTMENT (OUTPATIENT)
Dept: CT IMAGING | Age: 80
End: 2020-01-01
Attending: EMERGENCY MEDICINE
Payer: MEDICARE

## 2020-01-01 ENCOUNTER — OFFICE VISIT (OUTPATIENT)
Dept: INTERNAL MEDICINE CLINIC | Age: 80
End: 2020-01-01

## 2020-01-01 ENCOUNTER — HOSPITAL ENCOUNTER (EMERGENCY)
Age: 80
Discharge: HOME | End: 2020-06-25
Attending: EMERGENCY MEDICINE | Admitting: EMERGENCY MEDICINE
Payer: MEDICARE

## 2020-01-01 ENCOUNTER — VIRTUAL VISIT (OUTPATIENT)
Dept: INTERNAL MEDICINE CLINIC | Age: 80
End: 2020-01-01

## 2020-01-01 VITALS
HEIGHT: 62 IN | BODY MASS INDEX: 30.59 KG/M2 | WEIGHT: 166.2 LBS | DIASTOLIC BLOOD PRESSURE: 60 MMHG | SYSTOLIC BLOOD PRESSURE: 118 MMHG | HEART RATE: 66 BPM

## 2020-01-01 VITALS
TEMPERATURE: 98.3 F | OXYGEN SATURATION: 98 % | SYSTOLIC BLOOD PRESSURE: 138 MMHG | RESPIRATION RATE: 15 BRPM | HEART RATE: 76 BPM | DIASTOLIC BLOOD PRESSURE: 66 MMHG

## 2020-01-01 VITALS
RESPIRATION RATE: 17 BRPM | HEART RATE: 81 BPM | SYSTOLIC BLOOD PRESSURE: 108 MMHG | DIASTOLIC BLOOD PRESSURE: 61 MMHG | TEMPERATURE: 98.5 F | OXYGEN SATURATION: 99 %

## 2020-01-01 VITALS
TEMPERATURE: 98.4 F | HEIGHT: 62 IN | OXYGEN SATURATION: 97 % | RESPIRATION RATE: 16 BRPM | BODY MASS INDEX: 30.55 KG/M2 | DIASTOLIC BLOOD PRESSURE: 70 MMHG | HEART RATE: 76 BPM | WEIGHT: 166 LBS | SYSTOLIC BLOOD PRESSURE: 110 MMHG

## 2020-01-01 DIAGNOSIS — E78.5 HYPERLIPIDEMIA LDL GOAL <100: ICD-10-CM

## 2020-01-01 DIAGNOSIS — Z11.1 SCREENING-PULMONARY TB: ICD-10-CM

## 2020-01-01 DIAGNOSIS — Z79.899 ENCOUNTER FOR LONG-TERM (CURRENT) USE OF MEDICATIONS: ICD-10-CM

## 2020-01-01 DIAGNOSIS — S09.90XA INJURY OF HEAD, INITIAL ENCOUNTER: Primary | ICD-10-CM

## 2020-01-01 DIAGNOSIS — E11.22 TYPE 2 DIABETES MELLITUS WITH STAGE 3 CHRONIC KIDNEY DISEASE, WITH LONG-TERM CURRENT USE OF INSULIN (HCC): Primary | ICD-10-CM

## 2020-01-01 DIAGNOSIS — F03.90 DEMENTIA WITHOUT BEHAVIORAL DISTURBANCE, UNSPECIFIED DEMENTIA TYPE: Primary | ICD-10-CM

## 2020-01-01 DIAGNOSIS — L98.9 FACIAL SKIN LESION: ICD-10-CM

## 2020-01-01 DIAGNOSIS — Z79.4 TYPE 2 DIABETES MELLITUS WITH STAGE 3 CHRONIC KIDNEY DISEASE, WITH LONG-TERM CURRENT USE OF INSULIN (HCC): Primary | ICD-10-CM

## 2020-01-01 DIAGNOSIS — L57.8 ACTINIC SKIN DAMAGE: ICD-10-CM

## 2020-01-01 DIAGNOSIS — E03.8 CENTRAL HYPOTHYROIDISM: ICD-10-CM

## 2020-01-01 DIAGNOSIS — E27.40 ADRENAL INSUFFICIENCY (HCC): ICD-10-CM
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
## 2020-01-01 DIAGNOSIS — Z97.4 WEARS HEARING AID: ICD-10-CM

## 2020-01-01 DIAGNOSIS — N39.0 URINARY TRACT INFECTION WITHOUT HEMATURIA, SITE UNSPECIFIED: Primary | ICD-10-CM

## 2020-01-01 DIAGNOSIS — I10 BENIGN HYPERTENSION: Primary | ICD-10-CM

## 2020-01-01 DIAGNOSIS — F03.90 DEMENTIA WITHOUT BEHAVIORAL DISTURBANCE, UNSPECIFIED DEMENTIA TYPE: ICD-10-CM

## 2020-01-01 DIAGNOSIS — F32.A DEPRESSION, UNSPECIFIED DEPRESSION TYPE: ICD-10-CM

## 2020-01-01 DIAGNOSIS — S00.01XA SCALP ABRASION, INITIAL ENCOUNTER: ICD-10-CM

## 2020-01-01 DIAGNOSIS — S80.02XA CONTUSION OF LEFT KNEE, INITIAL ENCOUNTER: ICD-10-CM

## 2020-01-01 DIAGNOSIS — N18.30 TYPE 2 DIABETES MELLITUS WITH STAGE 3 CHRONIC KIDNEY DISEASE, WITH LONG-TERM CURRENT USE OF INSULIN (HCC): Primary | ICD-10-CM

## 2020-01-01 DIAGNOSIS — E78.2 MIXED HYPERLIPIDEMIA: ICD-10-CM

## 2020-01-01 LAB
ALBUMIN SERPL-MCNC: 2.9 G/DL (ref 3.5–5)
ALBUMIN SERPL-MCNC: 3.1 G/DL (ref 3.5–5)
ALBUMIN SERPL-MCNC: 3.9 G/DL (ref 3.5–4.8)
ALBUMIN/CREAT UR: 60 MG/G CREAT (ref 0–29)
ALBUMIN/GLOB SERPL: 0.8 {RATIO} (ref 1.1–2.2)
ALBUMIN/GLOB SERPL: 1 {RATIO} (ref 1.1–2.2)
ALBUMIN/GLOB SERPL: 1.7 {RATIO} (ref 1.2–2.2)
ALP SERPL-CCNC: 80 IU/L (ref 39–117)
ALP SERPL-CCNC: 88 U/L (ref 45–117)
ALP SERPL-CCNC: 95 U/L (ref 45–117)
ALT SERPL-CCNC: 14 IU/L (ref 0–32)
ALT SERPL-CCNC: 16 U/L (ref 12–78)
ALT SERPL-CCNC: 18 U/L (ref 12–78)
ANION GAP SERPL CALC-SCNC: 10 MMOL/L (ref 5–15)
ANION GAP SERPL CALC-SCNC: 6 MMOL/L (ref 5–15)
APPEARANCE UR: ABNORMAL
AST SERPL-CCNC: 11 U/L (ref 15–37)
AST SERPL-CCNC: 13 IU/L (ref 0–40)
AST SERPL-CCNC: 17 U/L (ref 15–37)
ATRIAL RATE: 78 BPM
ATRIAL RATE: 81 BPM
BACTERIA SPEC CULT: ABNORMAL
BACTERIA SPEC CULT: NORMAL
BACTERIA URNS QL MICRO: ABNORMAL /HPF
BASOPHILS # BLD: 0.1 K/UL (ref 0–0.1)
BASOPHILS # BLD: 0.1 K/UL (ref 0–0.1)
BASOPHILS NFR BLD: 0 % (ref 0–1)
BASOPHILS NFR BLD: 0 % (ref 0–1)
BILIRUB SERPL-MCNC: 0.1 MG/DL (ref 0.2–1)
BILIRUB SERPL-MCNC: 0.3 MG/DL (ref 0–1.2)
BILIRUB SERPL-MCNC: 0.4 MG/DL (ref 0.2–1)
BILIRUB UR QL: NEGATIVE
BUN SERPL-MCNC: 15 MG/DL (ref 8–27)
BUN SERPL-MCNC: 20 MG/DL (ref 6–20)
BUN SERPL-MCNC: 21 MG/DL (ref 8–27)
BUN SERPL-MCNC: 27 MG/DL (ref 6–20)
BUN/CREAT SERPL: 14 (ref 12–20)
BUN/CREAT SERPL: 15 (ref 12–28)
BUN/CREAT SERPL: 16 (ref 12–28)
BUN/CREAT SERPL: 19 (ref 12–20)
CALCIUM SERPL-MCNC: 8.4 MG/DL (ref 8.5–10.1)
CALCIUM SERPL-MCNC: 8.6 MG/DL (ref 8.5–10.1)
CALCIUM SERPL-MCNC: 9 MG/DL (ref 8.7–10.3)
CALCIUM SERPL-MCNC: 9.3 MG/DL (ref 8.7–10.3)
CALCULATED P AXIS, ECG09: 26 DEGREES
CALCULATED P AXIS, ECG09: 56 DEGREES
CALCULATED R AXIS, ECG10: -62 DEGREES
CALCULATED R AXIS, ECG10: -71 DEGREES
CALCULATED T AXIS, ECG11: 11 DEGREES
CALCULATED T AXIS, ECG11: 32 DEGREES
CC UR VC: ABNORMAL
CHLORIDE SERPL-SCNC: 103 MMOL/L (ref 96–106)
CHLORIDE SERPL-SCNC: 107 MMOL/L (ref 96–106)
CHLORIDE SERPL-SCNC: 107 MMOL/L (ref 97–108)
CHLORIDE SERPL-SCNC: 112 MMOL/L (ref 97–108)
CHOLEST SERPL-MCNC: 119 MG/DL (ref 100–199)
CO2 SERPL-SCNC: 19 MMOL/L (ref 21–32)
CO2 SERPL-SCNC: 24 MMOL/L (ref 20–29)
CO2 SERPL-SCNC: 26 MMOL/L (ref 20–29)
CO2 SERPL-SCNC: 27 MMOL/L (ref 21–32)
COLOR UR: ABNORMAL
COMMENT, HOLDF: NORMAL
CREAT SERPL-MCNC: 0.97 MG/DL (ref 0.57–1)
CREAT SERPL-MCNC: 1.28 MG/DL (ref 0.57–1)
CREAT SERPL-MCNC: 1.38 MG/DL (ref 0.55–1.02)
CREAT SERPL-MCNC: 1.39 MG/DL (ref 0.55–1.02)
CREAT UR-MCNC: 27.3 MG/DL
DIAGNOSIS, 93000: NORMAL
DIAGNOSIS, 93000: NORMAL
DIFFERENTIAL METHOD BLD: ABNORMAL
DIFFERENTIAL METHOD BLD: ABNORMAL
EOSINOPHIL # BLD: 0.1 K/UL (ref 0–0.4)
EOSINOPHIL # BLD: 0.1 K/UL (ref 0–0.4)
EOSINOPHIL NFR BLD: 1 % (ref 0–7)
EOSINOPHIL NFR BLD: 1 % (ref 0–7)
EPITH CASTS URNS QL MICRO: ABNORMAL /LPF
ERYTHROCYTE [DISTWIDTH] IN BLOOD BY AUTOMATED COUNT: 14.3 % (ref 11.5–14.5)
ERYTHROCYTE [DISTWIDTH] IN BLOOD BY AUTOMATED COUNT: 15 % (ref 11.5–14.5)
EST. AVERAGE GLUCOSE BLD GHB EST-MCNC: 163 MG/DL
EST. AVERAGE GLUCOSE BLD GHB EST-MCNC: 206 MG/DL
GLOBULIN SER CALC-MCNC: 2.3 G/DL (ref 1.5–4.5)
GLOBULIN SER CALC-MCNC: 3.1 G/DL (ref 2–4)
GLOBULIN SER CALC-MCNC: 3.6 G/DL (ref 2–4)
GLUCOSE SERPL-MCNC: 181 MG/DL (ref 65–99)
GLUCOSE SERPL-MCNC: 300 MG/DL (ref 65–100)
GLUCOSE SERPL-MCNC: 54 MG/DL (ref 65–99)
GLUCOSE SERPL-MCNC: 75 MG/DL (ref 65–100)
GLUCOSE UR STRIP.AUTO-MCNC: NEGATIVE MG/DL
HBA1C MFR BLD: 7.3 % (ref 4.8–5.6)
HBA1C MFR BLD: 8.8 % (ref 4.8–5.6)
HCT VFR BLD AUTO: 38.4 % (ref 35–47)
HCT VFR BLD AUTO: 40 % (ref 35–47)
HDLC SERPL-MCNC: 52 MG/DL
HGB BLD-MCNC: 11.8 G/DL (ref 11.5–16)
HGB BLD-MCNC: 12.3 G/DL (ref 11.5–16)
HGB UR QL STRIP: ABNORMAL
IMM GRANULOCYTES # BLD AUTO: 0.1 K/UL (ref 0–0.04)
IMM GRANULOCYTES # BLD AUTO: 0.1 K/UL (ref 0–0.04)
IMM GRANULOCYTES NFR BLD AUTO: 1 % (ref 0–0.5)
IMM GRANULOCYTES NFR BLD AUTO: 1 % (ref 0–0.5)
INTERPRETATION, 910389: NORMAL
INTERPRETATION: NORMAL
KETONES UR QL STRIP.AUTO: NEGATIVE MG/DL
LACTATE SERPL-SCNC: 2.4 MMOL/L (ref 0.4–2)
LDLC SERPL CALC-MCNC: 46 MG/DL (ref 0–99)
LEUKOCYTE ESTERASE UR QL STRIP.AUTO: ABNORMAL
LYMPHOCYTES # BLD: 2.6 K/UL (ref 0.8–3.5)
LYMPHOCYTES # BLD: 3.4 K/UL (ref 0.8–3.5)
LYMPHOCYTES NFR BLD: 14 % (ref 12–49)
LYMPHOCYTES NFR BLD: 21 % (ref 12–49)
Lab: NORMAL
MCH RBC QN AUTO: 27.3 PG (ref 26–34)
MCH RBC QN AUTO: 27.5 PG (ref 26–34)
MCHC RBC AUTO-ENTMCNC: 30.7 G/DL (ref 30–36.5)
MCHC RBC AUTO-ENTMCNC: 30.8 G/DL (ref 30–36.5)
MCV RBC AUTO: 88.7 FL (ref 80–99)
MCV RBC AUTO: 89.5 FL (ref 80–99)
MICROALBUMIN UR-MCNC: 16.3 UG/ML
MONOCYTES # BLD: 1.2 K/UL (ref 0–1)
MONOCYTES # BLD: 1.7 K/UL (ref 0–1)
MONOCYTES NFR BLD: 7 % (ref 5–13)
MONOCYTES NFR BLD: 9 % (ref 5–13)
NEUTS SEG # BLD: 11.5 K/UL (ref 1.8–8)
NEUTS SEG # BLD: 14.5 K/UL (ref 1.8–8)
NEUTS SEG NFR BLD: 70 % (ref 32–75)
NEUTS SEG NFR BLD: 75 % (ref 32–75)
NITRITE UR QL STRIP.AUTO: POSITIVE
NRBC # BLD: 0 K/UL (ref 0–0.01)
NRBC # BLD: 0 K/UL (ref 0–0.01)
NRBC BLD-RTO: 0 PER 100 WBC
NRBC BLD-RTO: 0 PER 100 WBC
P-R INTERVAL, ECG05: 172 MS
P-R INTERVAL, ECG05: 176 MS
PDF IMAGE, 910387: NORMAL
PH UR STRIP: 6 [PH] (ref 5–8)
PLATELET # BLD AUTO: 364 K/UL (ref 150–400)
PLATELET # BLD AUTO: 413 K/UL (ref 150–400)
PMV BLD AUTO: 9.7 FL (ref 8.9–12.9)
PMV BLD AUTO: 9.9 FL (ref 8.9–12.9)
POTASSIUM SERPL-SCNC: 3.8 MMOL/L (ref 3.5–5.2)
POTASSIUM SERPL-SCNC: 3.9 MMOL/L (ref 3.5–5.1)
POTASSIUM SERPL-SCNC: 4 MMOL/L (ref 3.5–5.1)
POTASSIUM SERPL-SCNC: 4 MMOL/L (ref 3.5–5.2)
PROT SERPL-MCNC: 6.2 G/DL (ref 6.4–8.2)
PROT SERPL-MCNC: 6.2 G/DL (ref 6–8.5)
PROT SERPL-MCNC: 6.5 G/DL (ref 6.4–8.2)
PROT UR STRIP-MCNC: 30 MG/DL
Q-T INTERVAL, ECG07: 388 MS
Q-T INTERVAL, ECG07: 410 MS
QRS DURATION, ECG06: 114 MS
QRS DURATION, ECG06: 114 MS
QTC CALCULATION (BEZET), ECG08: 442 MS
QTC CALCULATION (BEZET), ECG08: 476 MS
RBC # BLD AUTO: 4.29 M/UL (ref 3.8–5.2)
RBC # BLD AUTO: 4.51 M/UL (ref 3.8–5.2)
RBC #/AREA URNS HPF: ABNORMAL /HPF (ref 0–5)
SAMPLES BEING HELD,HOLD: NORMAL
SERVICE CMNT-IMP: ABNORMAL
SERVICE CMNT-IMP: NORMAL
SODIUM SERPL-SCNC: 140 MMOL/L (ref 136–145)
SODIUM SERPL-SCNC: 141 MMOL/L (ref 136–145)
SODIUM SERPL-SCNC: 144 MMOL/L (ref 134–144)
SODIUM SERPL-SCNC: 147 MMOL/L (ref 134–144)
SP GR UR REFRACTOMETRY: 1.01 (ref 1–1.03)
T4 FREE SERPL-MCNC: 1.23 NG/DL (ref 0.82–1.77)
T4 FREE SERPL-MCNC: 1.6 NG/DL (ref 0.82–1.77)
T4 FREE SERPL-MCNC: 1.8 NG/DL (ref 0.8–1.5)
TRIGL SERPL-MCNC: 107 MG/DL (ref 0–149)
TROPONIN I SERPL-MCNC: <0.05 NG/ML
TSH SERPL DL<=0.05 MIU/L-ACNC: 0.01 UIU/ML (ref 0.36–3.74)
UR CULT HOLD, URHOLD: NORMAL
UROBILINOGEN UR QL STRIP.AUTO: 0.2 EU/DL (ref 0.2–1)
VENTRICULAR RATE, ECG03: 78 BPM
VENTRICULAR RATE, ECG03: 81 BPM
VLDLC SERPL CALC-MCNC: 21 MG/DL (ref 5–40)
WBC # BLD AUTO: 16.5 K/UL (ref 3.6–11)
WBC # BLD AUTO: 19.2 K/UL (ref 3.6–11)
WBC URNS QL MICRO: >100 /HPF (ref 0–4)

## 2020-01-01 PROCEDURE — 87086 URINE CULTURE/COLONY COUNT: CPT

## 2020-01-01 PROCEDURE — 70450 CT HEAD/BRAIN W/O DYE: CPT

## 2020-01-01 PROCEDURE — 87040 BLOOD CULTURE FOR BACTERIA: CPT

## 2020-01-01 PROCEDURE — 73502 X-RAY EXAM HIP UNI 2-3 VIEWS: CPT

## 2020-01-01 PROCEDURE — 84439 ASSAY OF FREE THYROXINE: CPT

## 2020-01-01 PROCEDURE — 99285 EMERGENCY DEPT VISIT HI MDM: CPT

## 2020-01-01 PROCEDURE — 80053 COMPREHEN METABOLIC PANEL: CPT

## 2020-01-01 PROCEDURE — 72125 CT NECK SPINE W/O DYE: CPT

## 2020-01-01 PROCEDURE — 74011250636 HC RX REV CODE- 250/636: Performed by: EMERGENCY MEDICINE

## 2020-01-01 PROCEDURE — 84484 ASSAY OF TROPONIN QUANT: CPT

## 2020-01-01 PROCEDURE — 36415 COLL VENOUS BLD VENIPUNCTURE: CPT

## 2020-01-01 PROCEDURE — 87186 SC STD MICRODIL/AGAR DIL: CPT

## 2020-01-01 PROCEDURE — 85025 COMPLETE CBC W/AUTO DIFF WBC: CPT

## 2020-01-01 PROCEDURE — 99284 EMERGENCY DEPT VISIT MOD MDM: CPT

## 2020-01-01 PROCEDURE — 87077 CULTURE AEROBIC IDENTIFY: CPT

## 2020-01-01 PROCEDURE — 96365 THER/PROPH/DIAG IV INF INIT: CPT

## 2020-01-01 PROCEDURE — 74011000250 HC RX REV CODE- 250: Performed by: EMERGENCY MEDICINE

## 2020-01-01 PROCEDURE — 81001 URINALYSIS AUTO W/SCOPE: CPT

## 2020-01-01 PROCEDURE — 74011000258 HC RX REV CODE- 258: Performed by: EMERGENCY MEDICINE

## 2020-01-01 PROCEDURE — 73562 X-RAY EXAM OF KNEE 3: CPT

## 2020-01-01 PROCEDURE — 71045 X-RAY EXAM CHEST 1 VIEW: CPT

## 2020-01-01 PROCEDURE — 93005 ELECTROCARDIOGRAM TRACING: CPT

## 2020-01-01 PROCEDURE — 84443 ASSAY THYROID STIM HORMONE: CPT

## 2020-01-01 PROCEDURE — 74011250637 HC RX REV CODE- 250/637: Performed by: EMERGENCY MEDICINE

## 2020-01-01 PROCEDURE — 83605 ASSAY OF LACTIC ACID: CPT

## 2020-01-01 RX ORDER — NITROFURANTOIN 25; 75 MG/1; MG/1
100 CAPSULE ORAL 2 TIMES DAILY
Qty: 10 CAP | Refills: 0 | Status: SHIPPED | OUTPATIENT
Start: 2020-01-01 | End: 2020-01-01

## 2020-01-01 RX ORDER — LANOLIN ALCOHOL/MO/W.PET/CERES
3 CREAM (GRAM) TOPICAL
COMMUNITY

## 2020-01-01 RX ORDER — TROSPIUM CHLORIDE 20 MG/1
20 TABLET, FILM COATED ORAL 2 TIMES DAILY
COMMUNITY

## 2020-01-01 RX ORDER — LEVOFLOXACIN 500 MG/1
500 TABLET, FILM COATED ORAL DAILY
Qty: 7 TAB | Refills: 0 | OUTPATIENT
Start: 2020-01-01 | End: 2020-01-01

## 2020-01-01 RX ORDER — LISINOPRIL 20 MG/1
TABLET ORAL DAILY
COMMUNITY

## 2020-01-01 RX ORDER — CRANBERRY FRUIT EXTRACT 250 MG
1 CAPSULE ORAL 2 TIMES DAILY
COMMUNITY

## 2020-01-01 RX ORDER — METFORMIN HYDROCHLORIDE 500 MG/1
500 TABLET, EXTENDED RELEASE ORAL 2 TIMES DAILY
COMMUNITY
End: 2020-01-01 | Stop reason: SDUPTHER

## 2020-01-01 RX ORDER — LEVOFLOXACIN 500 MG/1
500 TABLET, FILM COATED ORAL
Status: DISCONTINUED | OUTPATIENT
Start: 2020-01-01 | End: 2020-01-01

## 2020-01-01 RX ORDER — TRAMADOL HYDROCHLORIDE 50 MG/1
50 TABLET ORAL
COMMUNITY

## 2020-01-01 RX ORDER — NAPROXEN 250 MG/1
TABLET ORAL
COMMUNITY

## 2020-01-01 RX ORDER — SIMVASTATIN 80 MG/1
80 TABLET, FILM COATED ORAL DAILY
COMMUNITY

## 2020-01-01 RX ORDER — ACETAMINOPHEN 325 MG/1
650 TABLET ORAL
Status: COMPLETED | OUTPATIENT
Start: 2020-01-01 | End: 2020-01-01

## 2020-01-01 RX ORDER — GUAIFENESIN 100 MG/5ML
81 LIQUID (ML) ORAL DAILY
COMMUNITY

## 2020-01-01 RX ORDER — METFORMIN HYDROCHLORIDE 500 MG/1
TABLET, EXTENDED RELEASE ORAL
Qty: 90 TAB | Refills: 3 | Status: SHIPPED | OUTPATIENT
Start: 2020-01-01

## 2020-01-01 RX ORDER — BACITRACIN 500 UNIT/G
1 PACKET (EA) TOPICAL
Status: COMPLETED | OUTPATIENT
Start: 2020-01-01 | End: 2020-01-01

## 2020-01-01 RX ADMIN — BACITRACIN 1 PACKET: 500 OINTMENT TOPICAL at 21:19

## 2020-01-01 RX ADMIN — CEFTRIAXONE 1 G: 1 INJECTION, POWDER, FOR SOLUTION INTRAMUSCULAR; INTRAVENOUS at 14:55

## 2020-01-01 RX ADMIN — ACETAMINOPHEN 650 MG: 325 TABLET, FILM COATED ORAL at 19:26

## 2020-01-07 NOTE — TELEPHONE ENCOUNTER
[facility]) [phone] (H)     pt's daughter requesting a call back regarding a form she left for dr montes to fill out to move her mother to a new memory unit.

## 2020-01-07 NOTE — TELEPHONE ENCOUNTER
Returned call to MATEO Castillo, regarding forms received. Left detailed VM advising Tony Thompson an appointment is warranted for completion of the form. Encouraged her to contact the office back at her convenience to schedule.

## 2020-01-08 NOTE — PROGRESS NOTES
Chief Complaint   Patient presents with    Diabetes     pcp and pharmacy c onfirmed     History of Present Illness: Kevin Fletcher is a 78 y.o. female here for follow up of diabetes. Weight up 7 lbs since last visit in 9/19. She is here with her daughter, Erin Nichols, again. She did go out to [facility] before Christmas but ended up coming back on [place] Day and is back at [place]. Erin Nichols, may be looking for a different place to live. I updated her med list as below and the only change was her lisinopril is now 20 mg instead of 40 mg daily. Erin Nichols states that she is concerned that the facility may not always be giving her the 10 units of humalog plus the scale and are just giving her the scale and this could be leading to high sugars. When she was in [place] in a monitored setting, her readings were all in the 100-200 range. Still using the freestyle mely when away from the facility. she has the following indications to continue treatment with freestyle mely:  1) she has type 2 diabetes and is on an intensive insulin regimen with 4 injections per day  2) she tests her blood sugar 4 times per day and makes treatment decisions off her blood sugar readings and sensor readings  3) she requires adjustments to her insulin doses based on her  sensor readings  4) she has benefited from therapeutic continuous glucose monitoring and I recommend that she continue with this  5) she is seen in my office every 3-6 months    Current Outpatient Medications   Medication Sig    aspirin 81 mg chewable tablet Take 81 mg by mouth daily.  cranberry fruit extract (CRANBERRY EXTRACT) 250 mg capsule Take 1 Cap by mouth two (2) times a day.  L. rhamnosus GG/inulin ([address] PO) Take  by mouth two (2) times a day.  lisinopril (PRINIVIL, ZESTRIL) 20 mg tablet Take  by mouth daily.  trospium (SANCTURA) 20 mg tablet Take 20 mg by mouth two (2) times a day.     naproxen (NAPROSYN) 250 mg tablet Take  by mouth two (2) times daily as needed.  traMADol (ULTRAM) 50 mg tablet Take 50 mg by mouth every six (6) hours as needed for Pain.  metFORMIN ER (GLUCOPHAGE XR) 500 mg tablet Take 500 mg by mouth two (2) times a day.  insulin lispro (HUMALOG KWIKPEN INSULIN) 100 unit/mL kwikpen Inject 10 units before meals + 2 units for every 50 mg/dl above 200 mg/dl. Max 50 units per day--pt's son Lorrie Eason will be picking up insulin    VICTOZA 3-EDGARDO 0.6 mg/0.1 mL (18 mg/3 mL) pnij 1.8 mg by SubCUTAneous route daily.  clopidogrel (PLAVIX) 75 mg tab Take 1 Tab by mouth daily.  MYRBETRIQ 50 mg ER tablet Take 1 Tab by mouth daily. Take 1 tab daily    potassium chloride SR (KLOR-CON 10) 10 mEq tablet Take 1 Tab by mouth two (2) times a day. Take 1 tab twice daily    atorvastatin (LIPITOR) 80 mg tablet TAKE ONE TABLET BY MOUTH EVERY DAY    levothyroxine (SYNTHROID) 112 mcg tablet Take 1 Tab by mouth Daily (before breakfast).  escitalopram oxalate (LEXAPRO) 10 mg tablet Take 1 Tab by mouth daily. Indications: major depressive disorder    amLODIPine (NORVASC) 5 mg tablet TAKE ONE TABLET BY MOUTH EVERY DAY    furosemide (LASIX) 40 mg tablet Take 1 Tab by mouth daily.  cranberry extract 50 mg chew 1 tab bid    fludrocortisone (FLORINEF) 0.1 mg tablet Take 1 Tab by mouth daily.  TRESIBA FLEXTOUCH U-100 100 unit/mL (3 mL) inpn INJECT 30 UNITS SUB-Q DAILY (DM)    Insulin Needles, Disposable, (BD ULTRA-FINE MINI PEN NEEDLE) 31 gauge x 3/16\" ndle USE TO INJECT WITH TRESIBA AND VICTOZA AS DIRECTED    loperamide (IMODIUM) 2 mg capsule 1 tab at onset of diarrhea, then 1 tab after each loose stool. Max dose of 4 tabs in 24 hours. Indications: diarrhea    ascorbic acid, vitamin C, (VITAMIN C) 500 mg tablet Take 500 mg by mouth daily.  acetaminophen (TYLENOL) 500 mg tablet Take  by mouth every six (6) hours as needed for Pain.     diphenhydrAMINE (BENADRYL ALLERGY) 25 mg tablet Take 25 mg by mouth every six (6) hours as needed.  hydrocortisone (CORTEF) 5 mg tablet TAKE 3 TABLETS BY MOUTH EVERY MORNING AND 1 TABLET EVERY EVENING    flash glucose sensor (FREESTYLE EMI 14 DAY SENSOR) kit Use to check blood sugars. Change site every 14 days.  flash glucose scanning reader (FREESTYLE EMI 14 DAY READER) misc Use to check blood sugars. Change site every 14 days. No current facility-administered medications for this visit. Allergies   Allergen Reactions    Amoxicillin Unknown (comments)    Erythromycin Rash and Unknown (comments)     fever     Review of Systems:  - Eyes: no blurry vision or double vision  - Cardiovascular: no chest pain  - Respiratory: no shortness of breath  - Musculoskeletal: no myalgias  - Neurological: no numbness/tingling in extremities    Physical Examination:  Blood pressure 118/60, pulse 66, height 5' 2\" (1.575 m), weight 166 lb 3.2 oz (75.4 kg). - General: pleasant, no distress, good eye contact   - Neck: no carotid bruits  - Cardiovascular: regular, normal rate, nl s1 and s2, no m/r/g,   - Respiratory: clear bilaterally  - Integumentary: no edema,   - Psychiatric: normal mood and affect    Data Reviewed:   Component      Latest Ref Rng & Units 1/4/2020 1/4/2020 1/4/2020 1/4/2020           8:38 AM  8:38 AM  8:38 AM  8:38 AM   Glucose      65 - 99 mg/dL  181 (H)     BUN      8 - 27 mg/dL  21     Creatinine      0.57 - 1.00 mg/dL  1.28 (H)     GFR est non-AA      >59 mL/min/1.73  40 (L)     GFR est AA      >59 mL/min/1.73  46 (L)     BUN/Creatinine ratio      12 - 28  16     Sodium      134 - 144 mmol/L  144     Potassium      3.5 - 5.2 mmol/L  3.8     Chloride      96 - 106 mmol/L  103     CO2      20 - 29 mmol/L  24     Calcium      8.7 - 10.3 mg/dL  9.3     Protein, total      6.0 - 8.5 g/dL  6.2     Albumin      3.5 - 4.8 g/dL  3.9     GLOBULIN, TOTAL      1.5 - 4.5 g/dL  2.3     A-G Ratio      1.2 - 2.2  1.7     Bilirubin, total      0.0 - 1.2 mg/dL  0.3     Alk. phosphatase      39 - 117 IU/L  80     AST      0 - 40 IU/L  13     ALT (SGPT)      0 - 32 IU/L  14     Cholesterol, total      100 - 199 mg/dL 119      Triglyceride      0 - 149 mg/dL 107      HDL Cholesterol      >39 mg/dL 52      VLDL, calculated      5 - 40 mg/dL 21      LDL, calculated      0 - 99 mg/dL 46      Hemoglobin A1c, (calculated)      4.8 - 5.6 %    8.8 (H)   Estimated average glucose      mg/dL    206   T4, Free      0.82 - 1.77 ng/dL   1.23        Assessment/Plan:     1. Type 2 diabetes mellitus with stage 3 chronic kidney disease, with long-term current use of insulin ([facility] [address]): her most recent Hgb A1c was 8.8% in 1/20 up from 7.7% in 9/19 up from 7% in 5/19 down from 9.8% in 1/19. Her A1c is just above goal of 8-8.5% given her chronic medical conditions and dementia so won't make any changes. - cont tresiba 30 units daily  - cont humalog 10 units before meals + 2 units for every 50 mg/dl above 200 mg/dl  - cont metformin  mg 1 tab bid  - cont victoza 1.8 mg daily  - check bs 4 times daily due to fluctuating sugars  - foot exam done 3/18  - eye exam 6/18  - microalbumin nl 5/19  - check A1c and bmp and microalbumin prior to next visit      2. 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 Free T4 1.2 in 2/19 on levothyroxine 112 mcg daily, up to 1.9 in 9/19 for unclear reasons and back to 1.23 in 1/20 so kept dose the same. - check free T4 prior to next visit  - cont levothyroxine 112 mcg daily      4.  Hyperlipidemia LDL goal <100: LDL 33 in 3/18 and 33 in 9/19 and 46 in 1/20 on lipitor 80 mg daily  - check lipids prior to next visit        Patient Instructions   1) Your Hemoglobin A1c (3 month test of blood sugar) was 8.8% up from 7.7% likely due to some spikes possibly from food and other times from not getting the correct dose of humalog. Make sure [place] checks your sugars before you eat and doses your Humalog 10 units for the meal PLUS 2 units for every 50 points over 200.    2) Your free T4 was normal so your thyroid dose of levothyroxine is perfect. 3) Your creatinine (kidney test) was slightly high due to mild dehydration. Drink at least 4 8oz glasses of water everyday to stay hydrated to prevent your creatinine level from going higher. 4) Your cholesterol is at goal.    5) ALT and AST are markers of liver function. Your values are normal.'    6) Your blood pressure looks great. Follow-up and Dispositions    · Return in about 5 months (around 6/8/2020).              Copy sent to:  Dena Ledezma MD

## 2020-01-08 NOTE — PATIENT INSTRUCTIONS
1) Your Hemoglobin A1c (3 month test of blood sugar) was 8.8% up from 7.7% likely due to some spikes possibly from food and other times from not getting the correct dose of humalog. Make sure Al[place] checks your sugars before you eat and doses your Humalog 10 units for the meal PLUS 2 units for every 50 points over 200.    2) Your free T4 was normal so your thyroid dose of levothyroxine is perfect. 3) Your creatinine (kidney test) was slightly high due to mild dehydration. Drink at least 4 8oz glasses of water everyday to stay hydrated to prevent your creatinine level from going higher. 4) Your cholesterol is at goal.    5) ALT and AST are markers of liver function. Your values are normal.'    6) Your blood pressure looks great.

## 2020-01-09 PROBLEM — Z97.4 WEARS HEARING AID: Status: ACTIVE | Noted: 2020-01-01

## 2020-01-09 NOTE — PROGRESS NOTES
79 yo female in for completion of paperwork for residence in [address] [place] [address] at [facility]. She saw her Urologist, Dr Sammie Grant, earlier today. No change in meds. She saw Dr. Haydee Rehman, Endocrinologist, yesterday; A1c is 8.8. Reviewed his suggestions with Cristiane Cook, PCA w/ [facility], who is with patient today. 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 She denies cough, reports no problem with BMs. PE: [facility] Elderly WF is alert and friendly although daily; she ambulates with assistance of a cane   Weight is stable   BP = 110/70   Skin - actinic lesions on upper forehead, and dark scab on distal nose (she reports this seems worse when she gets up in the mornings); also thickened skin on back of L hand   Ears - bilat canals with yellow devise with blue wire attached   Heart - RRR   Lungs - clear   LEs - no edema    Imp: Form completion for move into memory care at [facility] at [facility]   Need for TB screening   Skin - lesion on nose & actinic changes on face   Bilat hearing aides     Plan: Quantiferon Gold test today   Referral to Derm   Walk around more   Hydration with 4 glasses water a day   See Dr. Celia Warner as scheduled in June  _____________________  Expected course of current diagnosed problem(s) as well as expected progression and possible complications, and desired follow up with provider are discussed with patient. Patient is encouraged to be back in touch with any questions or concerns. Patient expresses understanding of plan of care. Patient is given AVS which includes diagnoses, current medications, vitals. ______________________    Current Outpatient Medications:     aspirin 81 mg chewable tablet, Take 81 mg by mouth daily. , Disp: , Rfl:     cranberry fruit extract (CRANBERRY EXTRACT) 250 mg capsule, Take 1 Cap by mouth two (2) times a day., Disp: , Rfl:    L. rhamnosus GG/inulin ([address] PO), Take  by mouth two (2) times a day., Disp: , Rfl:     lisinopril (PRINIVIL, ZESTRIL) 20 mg tablet, Take  by mouth daily. , Disp: , Rfl:     trospium (SANCTURA) 20 mg tablet, Take 20 mg by mouth two (2) times a day., Disp: , Rfl:     metFORMIN ER (GLUCOPHAGE XR) 500 mg tablet, Take 500 mg by mouth two (2) times a day., Disp: , Rfl:     insulin lispro (HUMALOG KWIKPEN INSULIN) 100 unit/mL kwikpen, Inject 10 units before meals + 2 units for every 50 mg/dl above 200 mg/dl. Max 50 units per day--pt's son Trudy Kendall will be picking up insulin, Disp: 5 Pen, Rfl: 3    VICTOZA 3-EDGARDO 0.6 mg/0.1 mL (18 mg/3 mL) pnij, 1.8 mg by SubCUTAneous route daily. , Disp: 3 Pen, Rfl: 11    clopidogrel (PLAVIX) 75 mg tab, Take 1 Tab by mouth daily. , Disp: 90 Tab, Rfl: 1    MYRBETRIQ 50 mg ER tablet, Take 1 Tab by mouth daily. Take 1 tab daily, Disp: 90 Tab, Rfl: 1    potassium chloride SR (KLOR-CON 10) 10 mEq tablet, Take 1 Tab by mouth two (2) times a day. Take 1 tab twice daily, Disp: 180 Tab, Rfl: 1    atorvastatin (LIPITOR) 80 mg tablet, TAKE ONE TABLET BY MOUTH EVERY DAY, Disp: 90 Tab, Rfl: 1    levothyroxine (SYNTHROID) 112 mcg tablet, Take 1 Tab by mouth Daily (before breakfast). , Disp: 90 Tab, Rfl: 1    escitalopram oxalate (LEXAPRO) 10 mg tablet, Take 1 Tab by mouth daily. Indications: major depressive disorder, Disp: 90 Tab, Rfl: 1    amLODIPine (NORVASC) 5 mg tablet, TAKE ONE TABLET BY MOUTH EVERY DAY, Disp: 90 Tab, Rfl: 1    furosemide (LASIX) 40 mg tablet, Take 1 Tab by mouth daily. , Disp: 90 Tab, Rfl: 1    cranberry extract 50 mg chew, 1 tab bid, Disp: 180 Tab, Rfl: 0    fludrocortisone (FLORINEF) 0.1 mg tablet, Take 1 Tab by mouth daily. , Disp: 30 Tab, Rfl: 11    TRESIBA FLEXTOUCH U-100 100 unit/mL (3 mL) inpn, INJECT 30 UNITS SUB-Q DAILY (DM), Disp: 15 mL, Rfl: 11    Insulin Needles, Disposable, (BD ULTRA-FINE MINI PEN NEEDLE) 31 gauge x 3/16\" ndle, USE TO INJECT WITH TRESIBA AND VICTOZA AS DIRECTED, Disp: 180 Pen Needle, Rfl: 3    loperamide (IMODIUM) 2 mg capsule, 1 tab at onset of diarrhea, then 1 tab after each loose stool. Max dose of 4 tabs in 24 hours. Indications: diarrhea, Disp: 30 Cap, Rfl: 3    ascorbic acid, vitamin C, (VITAMIN C) 500 mg tablet, Take 500 mg by mouth daily. , Disp: , Rfl:     hydrocortisone (CORTEF) 5 mg tablet, TAKE 3 TABLETS BY MOUTH EVERY MORNING AND 1 TABLET EVERY EVENING, Disp: 120 Tab, Rfl: 0    flash glucose sensor (FREESTYLE EMI 14 DAY SENSOR) kit, Use to check blood sugars. Change site every 14 days. , Disp: 2 Kit, Rfl: 0    flash glucose scanning reader (FREESTYLE EMI 14 DAY READER) misc, Use to check blood sugars. Change site every 14 days. , Disp: 2 Each, Rfl: 0    naproxen (NAPROSYN) 250 mg tablet, Take  by mouth two (2) times daily as needed. , Disp: , Rfl:     traMADol (ULTRAM) 50 mg tablet, Take 50 mg by mouth every six (6) hours as needed for Pain., Disp: , Rfl:     acetaminophen (TYLENOL) 500 mg tablet, Take  by mouth every six (6) hours as needed for Pain., Disp: , Rfl:     diphenhydrAMINE (BENADRYL ALLERGY) 25 mg tablet, Take 25 mg by mouth every six (6) hours as needed. , Disp: , Rfl:

## 2020-01-09 NOTE — PROGRESS NOTES
Chief Complaint   Patient presents with    Memory Loss     Reviewed record in preparation for visit and have obtained necessary documentation. Identified pt with two pt identifiers(name and ). There are no preventive care reminders to display for this patient. Chief Complaint   Patient presents with    Memory Loss        Wt Readings from Last 3 Encounters:   20 166 lb (75.3 kg)   20 166 lb 3.2 oz (75.4 kg)   19 166 lb (75.3 kg)     Temp Readings from Last 3 Encounters:   20 98.4 °F (36.9 °C) (Oral)   19 98.2 °F (36.8 °C) (Oral)   19 97.5 °F (36.4 °C)     BP Readings from Last 3 Encounters:   20 110/70   20 118/60   19 132/70     Pulse Readings from Last 3 Encounters:   20 76   20 66   19 74           Learning Assessment:  :     Learning Assessment 2017   PRIMARY LEARNER Patient   HIGHEST LEVEL OF EDUCATION - PRIMARY LEARNER  > 4 YEARS OF COLLEGE   BARRIERS PRIMARY LEARNER NONE   PRIMARY LANGUAGE ENGLISH   LEARNER PREFERENCE PRIMARY LISTENING   ANSWERED BY patient   RELATIONSHIP SELF       Depression Screening:  :     3 most recent PHQ Screens 2018   Little interest or pleasure in doing things Not at all   Feeling down, depressed, irritable, or hopeless Not at all   Total Score PHQ 2 0       Fall Risk Assessment:  :     Fall Risk Assessment, last 12 mths 2019   Able to walk? Yes   Fall in past 12 months? No       Abuse Screening:  :     Abuse Screening Questionnaire 2017   Do you ever feel afraid of your partner? N   Are you in a relationship with someone who physically or mentally threatens you? N   Is it safe for you to go home?  Y       Coordination of Care Questionnaire:  :     1) Have you been to an emergency room, urgent care clinic since your last visit? no   Hospitalized since your last visit? no             2) Have you seen or consulted any other health care providers outside of [facility] since your last visit? yes  (Include any pap smears or colon screenings in this section.)    3) Do you have an Advance Directive on file? yes    4) Are you interested in receiving information on Advance Directives? NO      Patient is accompanied by nursing attendant I have received verbal consent from Guillermo Rahman to discuss any/all medical information while they are present in the room. Reviewed record  In preparation for visit and have obtained necessary documentation.

## 2020-01-09 NOTE — PATIENT INSTRUCTIONS
Drink water - 4 glasses a day    Walk around once a hour    Make appointment to see Dermatologist about lesions on face

## 2020-01-15 NOTE — TELEPHONE ENCOUNTER
Daughter, Berhane Alberto, needs to  forms for entry into Memory Care at [facility]. Quantiferon gold result not yet back, so I will hold out that TB screening form until result is in. Otherwise, forms are ready for pick-up.

## 2020-01-15 NOTE — TELEPHONE ENCOUNTER
Tony Eon call today stating that she send a copy of her mom t.b test on my chart and can you  Call Poppy Vasquez at [phone] today she needs to   the form today

## 2020-01-15 NOTE — TELEPHONE ENCOUNTER
LVM requesting Ilene Burgre to contact the office back. Need further information regarding the Quantiferon Gold Test and if it was completed. [facility] does not have documentation indicating a test was ever drawn.

## 2020-01-16 NOTE — TELEPHONE ENCOUNTER
Dulce Maria Deleon [phone]  [facility]      Please sign and fax back the med list to [phone].  She wants to move in today and can not without this paper work

## 2020-01-16 NOTE — TELEPHONE ENCOUNTER
Returned call to Laura on the number provided. ARSENIO requesting she contact the office back to provide further clarification on the message received.    At time of call, the office nor provider has received a medication list. (Rx list was faxed back with the completed form yesterday)

## 2020-01-16 NOTE — TELEPHONE ENCOUNTER
Received return call from [facility]. She states a completed medication list was faxed to the office for providers signature. Nurse provided clinical fax number to send the list directly for provider review. Medication list received, reviewed, and faxed back to the number provided. Confirmation received.

## 2020-02-29 NOTE — TELEPHONE ENCOUNTER
I received word today that patient has moved to the [address] [place] [address]. Are you able to obtain their fax number and phone number so I can reprint my letter to send there with her new insulin orders?

## 2020-03-02 NOTE — TELEPHONE ENCOUNTER
Letter faxed and confirmation received. Per Maegan Sen (nurse) they did receive the fax at the [facility].

## 2020-03-02 NOTE — TELEPHONE ENCOUNTER
I confirmed with Emanuel at [facility] that Ms. Meg Carver is there. The fax # is [phone] and the phone # is [phone].

## 2020-03-10 NOTE — TELEPHONE ENCOUNTER
Sierra Christensen the nurse at [facility] taking care of Ms. Cyrus Paige, she stated pt is hyperglycemic episode. She states pt sugar this morning is 235, at lunch time is 136. She's asking a return call for advise. She can be reach @ [phone] or you can try her cell phone# [phone].

## 2020-04-11 NOTE — TELEPHONE ENCOUNTER
Will have Tita Thompson stop calling and Geraldine Gandhi can reach out if something is still needed.

## 2020-04-14 NOTE — TELEPHONE ENCOUNTER
----- Message from Jaguar Ham sent at 4/14/2020  9:38 AM EDT -----  Regarding: /Telephone  General Message/Vendor Calls    Caller's first and last name:Anusha      Reason for call:verification       Callback required yes/no and why: yes      Best contact number(s): [phone]      Details to clarify the request:Anusha called from [facility] in regards to pt Humalog sliding scale needs to be verified .        Jaguar Ham

## 2020-04-14 NOTE — TELEPHONE ENCOUNTER
Per Isaac MORILLO they would like a copy of the patient scale for her Humalog  fax to them at [phone].

## 2020-06-01 NOTE — ED PROVIDER NOTES
66-year-old female presents from living facility after a ground-level fall. This was witnessed. EMS reported that the patient was seen tripping and falling onto the floor. People on scene denied any loss of consciousness. She did hit her head. Patient now complaining of pain and bruising to her forehead. She has pain and swelling to the left knee and pain to the right buttock. She denies any chest pain or trouble breathing. No vomiting or diarrhea. No other complaints at this time. Past Medical History:  
Diagnosis Date  Arthritis osteoporosis  Cataract  Diabetes ([facility] [address] type 2 - Endocrinology  Endocrine disease 1972  
 adrenal insufficiency - bilat adrenalectomy  GERD (gastroesophageal reflux disease)  H/O Cushing disease  Hypertension  Other ill-defined conditions(799.89)   
 elizabeth's disease  Stroke [facility]) 2016 TIA 2011 & L occipial stroke 4/2016  Thyroid disease hypothyroid  UTI (lower urinary tract infection) Past Surgical History:  
Procedure Laterality Date  ENDOCRINE SURGERY PROC UNLISTED Bilateral 1972  
 bilat adrenalectomy in 1972 [address]  HX CATARACT REMOVAL Left ON the left [facility]  HX HYSTERECTOMY  1980s  HX TONSILLECTOMY Family History:  
Problem Relation Age of Onset  Cancer Mother  Diabetes Mother  Stroke Mother  Psychiatric Disorder Mother  Dementia Mother  Headache Mother  Heart Disease Father  Psychiatric Disorder Father  Stroke Father  Asthma Sister  Diabetes Sister  Asthma Brother  Heart Disease Brother Social History Socioeconomic History  Marital status:  Spouse name: Not on file  Number of children: Not on file  Years of education: Not on file  Highest education level: Not on file Occupational History  Not on file Social Needs  Financial resource strain: Not on file  Food insecurity Worry: Not on file Inability: Not on file  Transportation needs Medical: Not on file Non-medical: Not on file Tobacco Use  Smoking status: Never Smoker  Smokeless tobacco: Never Used Substance and Sexual Activity  Alcohol use: No  
 Drug use: No  
 Sexual activity: Not Currently Lifestyle  Physical activity Days per week: Not on file Minutes per session: Not on file  Stress: Not on file Relationships  Social connections Talks on phone: Not on file Gets together: Not on file Attends Baptism service: Not on file Active member of club or organization: Not on file Attends meetings of clubs or organizations: Not on file Relationship status: Not on file  Intimate partner violence Fear of current or ex partner: Not on file Emotionally abused: Not on file Physically abused: Not on file Forced sexual activity: Not on file Other Topics Concern  Not on file Social History Narrative  Not on file ALLERGIES: Erythromycin and Amoxicillin Review of Systems Constitutional: Negative for fever. HENT: Negative for facial swelling. Eyes: Negative for visual disturbance. Respiratory: Negative for chest tightness. Cardiovascular: Negative for chest pain. Gastrointestinal: Negative for abdominal pain. Genitourinary: Negative for difficulty urinating and dysuria. Musculoskeletal: Negative for arthralgias. Skin: Negative for rash. Neurological: Negative for headaches. Hematological: Negative for adenopathy. Psychiatric/Behavioral: Negative for suicidal ideas. Vitals:  
 06/01/20 1913 BP: 150/64 Pulse: 79 Resp: 18 Temp: 98.3 °F (36.8 °C) SpO2: 99% Physical Exam 
Vitals signs and nursing note reviewed. Constitutional:   
   General: She is not in acute distress. Appearance: She is well-developed. HENT:  
   Head: Normocephalic. Comments: Contusion with abrasion to L forehead Eyes:  
   General: No scleral icterus. Conjunctiva/sclera: Conjunctivae normal.  
   Pupils: Pupils are equal, round, and reactive to light. Neck: Musculoskeletal: Normal range of motion and neck supple. Cardiovascular:  
   Rate and Rhythm: Normal rate. Heart sounds: No murmur. Pulmonary:  
   Effort: Pulmonary effort is normal. No respiratory distress. Abdominal:  
   General: There is no distension. Musculoskeletal: Normal range of motion. Comments: Swelling and abrasion to L knee. FROM. Skin: 
   General: Skin is warm and dry. Findings: No rash. Neurological:  
   Mental Status: She is alert and oriented to person, place, and time. MDM Number of Diagnoses or Management Options Contusion of left knee, initial encounter:  
Injury of head, initial encounter:  
Scalp abrasion, initial encounter:  
Diagnosis management comments: Assessment: Imaging unremarkable. No evidence of acute injuries. Patient with a contusion and abrasion to her left forehead. Not amenable to repair at this time. Blood work and EKG also unremarkable. Stable for discharge home. Amount and/or Complexity of Data Reviewed Clinical lab tests: reviewed Tests in the radiology section of CPT®: reviewed Tests in the medicine section of CPT®: reviewed Decide to obtain previous medical records or to obtain history from someone other than the patient: yes ED Course as of Jun 01 2055 Mon Jun 01, 2020 1944 ED EKG interpretation: 
Rhythm: normal sinus rhythm. Rate (approx.): 78. Axis: normal.  ST segment:  No concerning ST elevations or depressions. LAFB. This EKG was interpreted by Danisha Mercer MD,ED Provider. EKG 12 LEAD INITIAL [JM] ED Course User Index [JM] Valerie Jane MD  
 
 
Procedures

## 2020-06-01 NOTE — ED TRIAGE NOTES
Pt arrived via EMS from [facility] due to a GLF. Pt has swelling to forehead with small laceration and abrasions to nose.

## 2020-06-02 NOTE — ED NOTES
Pt up for discharge. Last minute pt needs met, IV removed, dressing placed, and [facility] called with an ETA of 2230.

## 2020-06-05 NOTE — TELEPHONE ENCOUNTER
Please call the [facility] at [phone] and find out where you can fax an order to have her labs drawn on Monday so they will be ready for her visit with me on Wednesday 6/10/20.

## 2020-06-10 NOTE — PROGRESS NOTES
Chief Complaint Patient presents with  Diabetes  
  pcp and pharmacy is [facility] [phone] **THIS IS A VIRTUAL VISIT VIA A TELEPHONE ENCOUNTER. PATIENT AGREED TO HAVE THEIR CARE DELIVERED OVER THE PHONE IN PLACE OF THEIR REGULARLY SCHEDULED OFFICE VISIT** History of Present Illness: Echo Young is a 78 y.o. female here for follow up of diabetes. She has been in quarantine at the [facility] during all of COVID aside from on Monday when her daughter, Jordan Palomino, took her out to get her hearing aids fixed and went to have her labs draw. Jordan Palomino is on the call with us today and she is not aware of any severe highs or lows over the past few months since we re-faxed the scale for Humalog in 4/20. She has had trouble with her bowels being loose for sometime and Jordan Palomino asked about the metformin and we will try to decrease her dose to see if this helps. Compliant with rest of meds below as they are given to her at the facility. Ms. Flower [place] has no complaints at this time and will be turning 80 later this month Still using the freestyle mely when away from the facility. she has the following indications to continue treatment with freestyle mely: 
1) she has type 2 diabetes and is on an intensive insulin regimen with 4 injections per day 2) she tests her blood sugar 4 times per day and makes treatment decisions off her blood sugar readings and sensor readings 3) she requires adjustments to her insulin doses based on her  sensor readings 4) she has benefited from therapeutic continuous glucose monitoring and I recommend that she continue with this 5) she is seen in my office every 3-6 months Current Outpatient Medications Medication Sig  
 melatonin 3 mg tablet Take 3 mg by mouth nightly.  simvastatin (Zocor) 80 mg tablet Take 80 mg by mouth daily.  OTHER Triple abx ointment apply daily.  aspirin 81 mg chewable tablet Take 81 mg by mouth daily.  cranberry fruit extract (CRANBERRY EXTRACT) 250 mg capsule Take 1 Cap by mouth two (2) times a day.  L. rhamnosus GG/inulin ([address] PO) Take  by mouth two (2) times a day.  lisinopril (PRINIVIL, ZESTRIL) 20 mg tablet Take  by mouth daily.  trospium (SANCTURA) 20 mg tablet Take 20 mg by mouth two (2) times a day.  naproxen (NAPROSYN) 250 mg tablet Take  by mouth two (2) times daily as needed.  traMADol (ULTRAM) 50 mg tablet Take 50 mg by mouth every six (6) hours as needed for Pain.  metFORMIN ER (GLUCOPHAGE XR) 500 mg tablet Take 500 mg by mouth two (2) times a day.  insulin lispro (HUMALOG KWIKPEN INSULIN) 100 unit/mL kwikpen Inject 10 units before meals + 2 units for every 50 mg/dl above 200 mg/dl. Max 50 units per day--pt's son Alicia Heller will be picking up insulin  VICTOZA 3-EDGARDO 0.6 mg/0.1 mL (18 mg/3 mL) pnij 1.8 mg by SubCUTAneous route daily.  clopidogrel (PLAVIX) 75 mg tab Take 1 Tab by mouth daily.  MYRBETRIQ 50 mg ER tablet Take 1 Tab by mouth daily. Take 1 tab daily  potassium chloride SR (KLOR-CON 10) 10 mEq tablet Take 1 Tab by mouth two (2) times a day. Take 1 tab twice daily  atorvastatin (LIPITOR) 80 mg tablet TAKE ONE TABLET BY MOUTH EVERY DAY  levothyroxine (SYNTHROID) 112 mcg tablet Take 1 Tab by mouth Daily (before breakfast).  escitalopram oxalate (LEXAPRO) 10 mg tablet Take 1 Tab by mouth daily. Indications: major depressive disorder  amLODIPine (NORVASC) 5 mg tablet TAKE ONE TABLET BY MOUTH EVERY DAY  furosemide (LASIX) 40 mg tablet Take 1 Tab by mouth daily.  fludrocortisone (FLORINEF) 0.1 mg tablet Take 1 Tab by mouth daily.  TRESIBA FLEXTOUCH U-100 100 unit/mL (3 mL) inpn INJECT 30 UNITS SUB-Q DAILY (DM)  Insulin Needles, Disposable, (BD ULTRA-FINE MINI PEN NEEDLE) 31 gauge x 3/16\" ndle USE TO INJECT WITH TRESIBA AND VICTOZA AS DIRECTED  
  loperamide (IMODIUM) 2 mg capsule 1 tab at onset of diarrhea, then 1 tab after each loose stool. Max dose of 4 tabs in 24 hours. Indications: diarrhea  ascorbic acid, vitamin C, (VITAMIN C) 500 mg tablet Take 500 mg by mouth daily.  acetaminophen (TYLENOL) 500 mg tablet Take  by mouth every six (6) hours as needed for Pain.  diphenhydrAMINE (BENADRYL ALLERGY) 25 mg tablet Take 25 mg by mouth every six (6) hours as needed.  hydrocortisone (CORTEF) 5 mg tablet TAKE 3 TABLETS BY MOUTH EVERY MORNING AND 1 TABLET EVERY EVENING  flash glucose sensor ([employer] EMI 14 DAY SENSOR) kit Use to check blood sugars. Change site every 14 days.  flash glucose scanning reader (FREESTYLE EMI 14 DAY READER) misc Use to check blood sugars. Change site every 14 days. No current facility-administered medications for this visit. Allergies Allergen Reactions  Erythromycin Rash and Unknown (comments) fever  Amoxicillin Unknown (comments) Review of Systems: PER HPI Physical Examination: NOT PERFORMED DUE TO THIS BEING A TELEPHONE CALL Data Reviewed:  
Component Latest Ref Rng & Units 6/8/2020 6/8/2020 6/8/2020 6/8/2020  
 
      4:37 PM  4:37 PM  4:37 PM  4:37 PM  
Glucose 65 - 99 mg/dL  54 (L) BUN 
    8 - 27 mg/dL  15 Creatinine 
    0.57 - 1.00 mg/dL  0.97 GFR est non-AA 
    >59 mL/min/1.73  56 (L)    
GFR est AA 
    >59 mL/min/1.73  64 BUN/Creatinine ratio 12 - 28  15 Sodium 134 - 144 mmol/L  147 (H) Potassium 3.5 - 5.2 mmol/L  4.0 Chloride 96 - 106 mmol/L  107 (H)    
CO2 
    20 - 29 mmol/L  26 Calcium 8.7 - 10.3 mg/dL  9.0 Creatinine, urine Not Estab. mg/dL 27.3 Microalbumin, urine Not Estab. ug/mL 16.3 Microalbumin/Creat. Ratio 0 - 29 mg/g creat 60 (H) Hemoglobin A1c, (calculated) 4.8 - 5.6 %    7.3 (H) Estimated average glucose 
    mg/dL    163 T4, Free 0.82 - 1.77 ng/dL   1.60 Assessment/Plan: 1. Type 2 diabetes mellitus with stage 3 chronic kidney disease, with long-term current use of insulin [facility] her most recent Hgb A1c was 7.3% in 6/20 down from 8.8% in 1/20 up from 7.7% in 9/19 up from 7% in 5/19 down from 9.8% in 1/19. Her A1c is below goal of 8-8.5% given her chronic medical conditions and dementia so will decrease her metformin to decrease risk of hypoglycemia and help with any loose bowels. - cont tresiba 30 units daily 
- cont humalog 10 units before meals + 2 units for every 50 mg/dl above 200 mg/dl 
- decrease metformin  mg to 1 tab at dinner only 
- cont victoza 1.8 mg daily - check bs 4 times daily due to fluctuating sugars 
- foot exam done 3/18 
- eye exam 6/18 
- microalbumin nl 5/19, up to 60 in 6/20 
- check A1c and cmp and microalbumin prior to next visit 2. Adrenal insufficiency ([facility] [address]):  Developed after bilateral adrenalectomy for Cushing's disease. 
- cont hydrocortisone 15 mg in am and 5 mg in pm 
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 Free T4 1.2 in 2/19 on levothyroxine 112 mcg daily, up to 1.9 in 9/19 for unclear reasons and back to 1.23 in 1/20 and 1.6 in 6/20 so kept dose the same. - check free T4 prior to next visit 
- cont levothyroxine 112 mcg daily 4. Hyperlipidemia LDL goal <100: LDL 33 in 3/18 and 33 in 9/19 and 46 in 1/20 on lipitor 80 mg daily - check lipids prior to next visit Patient Instructions 1) Your Hemoglobin A1c (3 month test of blood sugar) is very good at 7.3%. 2) I will decrease the metformin to 1 tab at dinner only and will contact the [place] to let them know of this change. 3) Your free T4 (thyroid test) was perfect so I will keep your dose the same of levothyroxine.  
 
4) Your urine protein was slightly elevated but I'm not too concerned and we can repeat this with your next set of labs. 5) BUN and creatinine are markers of kidney function. Your values are normal. 
 
6) Please come for a follow up visit on 12/23/20 at 2:30pm in our [facility] office. 7) I will mail you a lab slip. Please put this in the glove compartment or other safe spot where you keep your medical papers and I will send you a reminder to have your labs drawn prior to next visit. Follow-up and Dispositions · Return for 12/23/20 at 2:30pm. 
  
 
 
 
We spent 18 minutes of total time together during this virtual visit with a telephone encounter. All questions were answered and a copy of the instructions were sent to him via [employer] Copy sent to: Fab Izaguirre MD

## 2020-06-10 NOTE — TELEPHONE ENCOUNTER
Please call the [facility] at [phone] to find out if we want to decrease her metformin to 1 tab at dinner only (down from 1 tab bid), how do we go about changing this on her MAR at the facility? Do they take verbal orders or do I need to send a new prescription with this dose somewhere or fax a letter? I did print out a copy of her office note to be faxed to them at [phone]    I did send a new prescription to [address] [place] [address] with the updated directions. Will this be sufficient or the office note be enough?

## 2020-06-11 NOTE — PROGRESS NOTES
Carly Dover is a 78 y.o. female evaluated via telephone on 6/11/2020. Patient was on 3-way call with her daughter, Anali Arevalo, patient and myself. Documentation: I communicated with the patient and/or health care decision maker regarding: -hypertension, hyperlipidemia, depression and dementia. She is living in the memory unit at [facility]. Moved there in February, 2020. Details of this discussion including any medical advice provided is documented below. Diagnoses and all orders for this visit: 1. Benign hypertension -appears stable. Was seen in ER recently and no mention of elevated blood pressure. Daughter is not sure if it is taken regularly at her apartment. 2. Dementia without behavioral disturbance, unspecified dementia type ([facility] [address] -living at the [facility] memory unit. Has been doing quite well there. They have all been in strict quarantine. Visitation is via window. Patient fell last week, tripped, had laceration to forehead and bruising. Was taken to ER for evaluation. Daughter states that she is doing well now . 3. Depression, unspecified depression type 
-stable on current dose of lexapro 4. Mixed hyperlipidemia 
-labs ordered by Dr. Deepthi Velazquez. Reviewed and stable. 5. Encounter for long-term (current) use of medications 6. Fall, initial encounter 
-see note above. The following sections were reviewed and/or updated: 
Patient Active Problem List  
Diagnosis Code  TIA (transient ischemic attack) G45.9  Essential hypertension, benign I10  
 RLS (restless legs syndrome) G25.81  
 Cushing disease (HCC) E24.0  Depression F32.9  Elevated cholesterol E78.00  Vitamin D deficiency E55.9  Adrenal insufficiency (HCC) E27.40  Hypothyroidism E03.9  Meningioma ([facility] [address] D32.9  Cerebral infarction due to stenosis of left posterior cerebral artery (HCC) Y41.052  Type 2 diabetes mellitus with stage 3 chronic kidney disease, with long-term current use of insulin (HCC) E11.22, N18.3, Z79.4  Dementia ([facility] [address] F03.90  
 History of mammography, screening Z92.89  
 Colon cancer screening Z12.11  
 Wears hearing aid Z97.4 Current Outpatient Medications Medication Sig Dispense Refill  melatonin 3 mg tablet Take 3 mg by mouth nightly.  simvastatin (Zocor) 80 mg tablet Take 80 mg by mouth daily.  OTHER Triple abx ointment apply daily.  metFORMIN ER (GLUCOPHAGE XR) 500 mg tablet Take 1 tab with dinner only--STOP THE MORNING DOSE 90 Tab 3  
 aspirin 81 mg chewable tablet Take 81 mg by mouth daily.  cranberry fruit extract (CRANBERRY EXTRACT) 250 mg capsule Take 1 Cap by mouth two (2) times a day.  L. rhamnosus GG/inulin ([address] PO) Take  by mouth two (2) times a day.  lisinopril (PRINIVIL, ZESTRIL) 20 mg tablet Take  by mouth daily.  trospium (SANCTURA) 20 mg tablet Take 20 mg by mouth two (2) times a day.  naproxen (NAPROSYN) 250 mg tablet Take  by mouth two (2) times daily as needed.  traMADol (ULTRAM) 50 mg tablet Take 50 mg by mouth every six (6) hours as needed for Pain.  insulin lispro (HUMALOG KWIKPEN INSULIN) 100 unit/mL kwikpen Inject 10 units before meals + 2 units for every 50 mg/dl above 200 mg/dl. Max 50 units per day--pt's son Alfonzo Yusuf will be picking up insulin 5 Pen 3  
 VICTOZA 3-EDGARDO 0.6 mg/0.1 mL (18 mg/3 mL) pnij 1.8 mg by SubCUTAneous route daily. 3 Pen 11  
 clopidogrel (PLAVIX) 75 mg tab Take 1 Tab by mouth daily. 90 Tab 1  
 MYRBETRIQ 50 mg ER tablet Take 1 Tab by mouth daily. Take 1 tab daily 90 Tab 1  potassium chloride SR (KLOR-CON 10) 10 mEq tablet Take 1 Tab by mouth two (2) times a day. Take 1 tab twice daily 180 Tab 1  
 atorvastatin (LIPITOR) 80 mg tablet TAKE ONE TABLET BY MOUTH EVERY DAY 90 Tab 1  
 levothyroxine (SYNTHROID) 112 mcg tablet Take 1 Tab by mouth Daily (before breakfast).  90 Tab 1  
  escitalopram oxalate (LEXAPRO) 10 mg tablet Take 1 Tab by mouth daily. Indications: major depressive disorder 90 Tab 1  
 amLODIPine (NORVASC) 5 mg tablet TAKE ONE TABLET BY MOUTH EVERY DAY 90 Tab 1  
 furosemide (LASIX) 40 mg tablet Take 1 Tab by mouth daily. 90 Tab 1  
 fludrocortisone (FLORINEF) 0.1 mg tablet Take 1 Tab by mouth daily. 30 Tab 11  
 TRESIBA FLEXTOUCH U-100 100 unit/mL (3 mL) inpn INJECT 30 UNITS SUB-Q DAILY (DM) 15 mL 11  
 Insulin Needles, Disposable, (BD ULTRA-FINE MINI PEN NEEDLE) 31 gauge x 3/16\" ndle USE TO INJECT WITH TRESIBA AND VICTOZA AS DIRECTED 180 Pen Needle 3  
 loperamide (IMODIUM) 2 mg capsule 1 tab at onset of diarrhea, then 1 tab after each loose stool. Max dose of 4 tabs in 24 hours. Indications: diarrhea 30 Cap 3  
 ascorbic acid, vitamin C, (VITAMIN C) 500 mg tablet Take 500 mg by mouth daily.  acetaminophen (TYLENOL) 500 mg tablet Take  by mouth every six (6) hours as needed for Pain.  diphenhydrAMINE (BENADRYL ALLERGY) 25 mg tablet Take 25 mg by mouth every six (6) hours as needed.  hydrocortisone (CORTEF) 5 mg tablet TAKE 3 TABLETS BY MOUTH EVERY MORNING AND 1 TABLET EVERY EVENING 120 Tab 0  
 flash glucose sensor (FREESTYLE EMI 14 DAY SENSOR) kit Use to check blood sugars. Change site every 14 days. 2 Kit 0  
 flash glucose scanning reader (FREESTYLE EMI 14 DAY READER) misc Use to check blood sugars. Change site every 14 days. 2 Each 0 Consent: 
Leta Barron, who was seen by synchronous (real-time) audio only technology, and/or her healthcare decision maker, is aware that this patient-initiated, Telehealth encounter on 6/11/2020 is a billable service. She is aware that she may receive a bill for any such additional services and has provided verbal consent to proceed: Yes. Patient identification was verified prior to start of the visit. A caregiver was present when appropriate.  Due to this being a TeleHealth encounter (during DTWDS-85 public health emergency), evaluation of the following organ systems was limited: VS/Constitutional/EENT/Resp/CV/GI//MS/Neuro/Skin/Heme-Lymph-Imm. Pursuant to the emergency declaration under the [facility] waiver authority and the [employer] Act, this Virtual Visit was conducted, with patient's (and/or legal guardian's) consent, to reduce the patient's risk of exposure to COVID-19 and provide necessary medical care. Services were provided through a synchronous discussion virtually to substitute for in-person clinic visit. I was in the office. The patient was at home. I affirm this is a Patient Initiated Episode with an Established Patient who has not had a related appointment within my department in the past 7 days or scheduled within the next 24 hours. Total Time: minutes: 21-30 minutes Note: not billable if this call serves to triage the patient into an appointment for the relevant concern Beatriz Hopper MD

## 2020-06-11 NOTE — PATIENT INSTRUCTIONS
1) Your Hemoglobin A1c (3 month test of blood sugar) is very good at 7.3%. 2) I will decrease the metformin to 1 tab at dinner only and will contact the [place] to let them know of this change. 3) Your free T4 (thyroid test) was perfect so I will keep your dose the same of levothyroxine. 4) Your urine protein was slightly elevated but I'm not too concerned and we can repeat this with your next set of labs. 5) BUN and creatinine are markers of kidney function. Your values are normal. 
 
6) Please come for a follow up visit on 12/23/20 at 2:30pm in our [facility] office. 7) I will mail you a lab slip. Please put this in the glove compartment or other safe spot where you keep your medical papers and I will send you a reminder to have your labs drawn prior to next visit.

## 2020-06-12 NOTE — TELEPHONE ENCOUNTER
Per Chrissy Candelaria (nurse) for Ms. Gisela Gal at the [facility] the order was changed on yesterday to 1 metformin at dinner only.    I informed Chrissy Candelaria that I will be faxing the office note to her per Dr. Raphael Baron

## 2020-06-22 NOTE — TELEPHONE ENCOUNTER
This writer called patient related to medication adherence, patient verified name and date of birth with daughter, wesley contact verified  This writer advised patients daughter, I am calling from [facility] [place] and work with the [facility]. Patient medical record shows simvastatin and atorvastatin 80mg. Patient daughter is unsure which medication is taken by patient. Insurance company reports medication has not been filled recently. Lisinopril is also overdue for a refill. Will send this note to the provider.

## 2020-06-25 NOTE — ED PROVIDER NOTES
Please note that this dictation was completed with [employer], the computer voice recognition software.  Quite often unanticipated grammatical, syntax, homophones, and other interpretive errors are inadvertently transcribed by the computer software.  Please disregard these errors.  Please excuse any errors that have escaped final proofreading. 79-year-old female nursing home resident with dementia, arthritis, cataract, type 2 diabetes, adrenal insufficiency, GERD, Cushing's disease, hypertension, stroke, thyroid disease and UTI zoë sharp  durable DNR arrives by EMS from the nursing home for chief complaint of \"she would need breakfast this morning. \"  Per the EMS crew nursing home staff states she decompensates quickly we just need to have her labs checked make sure she is okay'; patient arrives to the ER has no complaints asked if she was hungry felt like something to eat she states now I do not feel hungry right now patient knows the month is June knows she is in the ER and has no complaints of pain. pt denies HA, vison changes, diff swallowing, CP, SOB, Abd pain, F/Ch, N/V, D/Cons or other current systemic complaints Social/ PSH reviewed in EMR 
 
EMR Chart Reviewed Past Medical History:  
Diagnosis Date  Arthritis osteoporosis  Cataract  Diabetes ([facility] [address] type 2 - Endocrinology  Endocrine disease 1972  
 adrenal insufficiency - bilat adrenalectomy  GERD (gastroesophageal reflux disease)  H/O Cushing disease  Hypertension  Other ill-defined conditions(799.89)   
 elizabeth's disease  Stroke [facility]) 2016 TIA 2011 & L occipial stroke 4/2016  Thyroid disease hypothyroid  UTI (lower urinary tract infection) Past Surgical History:  
Procedure Laterality Date  ENDOCRINE SURGERY PROC UNLISTED Bilateral 1972  
 bilat adrenalectomy in 1972 [address] [place] [address]  HX CATARACT REMOVAL Left ON the left [facility]  HX HYSTERECTOMY  1980s  HX TONSILLECTOMY Family History:  
Problem Relation Age of Onset  Cancer Mother  Diabetes Mother  Stroke Mother  Psychiatric Disorder Mother  Dementia Mother  Headache Mother  Heart Disease Father  Psychiatric Disorder Father  Stroke Father  Asthma Sister  Diabetes Sister  Asthma Brother  Heart Disease Brother Social History Socioeconomic History  Marital status:  Spouse name: Not on file  Number of children: Not on file  Years of education: Not on file  Highest education level: Not on file Occupational History  Not on file Social Needs  Financial resource strain: Not on file  Food insecurity Worry: Not on file Inability: Not on file  Transportation needs Medical: Not on file Non-medical: Not on file Tobacco Use  Smoking status: Never Smoker  Smokeless tobacco: Never Used Substance and Sexual Activity  Alcohol use: No  
 Drug use: No  
 Sexual activity: Not Currently Lifestyle  Physical activity Days per week: Not on file Minutes per session: Not on file  Stress: Not on file Relationships  Social connections Talks on phone: Not on file Gets together: Not on file Attends Jewish service: Not on file Active member of club or organization: Not on file Attends meetings of clubs or organizations: Not on file Relationship status: Not on file  Intimate partner violence Fear of current or ex partner: Not on file Emotionally abused: Not on file Physically abused: Not on file Forced sexual activity: Not on file Other Topics Concern  Not on file Social History Narrative  Not on file ALLERGIES: Erythromycin and Amoxicillin Review of Systems Constitutional: Negative for chills and fever. HENT: Negative for drooling, sore throat, trouble swallowing and voice change. Eyes: Negative for pain and visual disturbance. Respiratory: Negative for cough and shortness of breath. Gastrointestinal: Negative for abdominal pain. Genitourinary: Negative for dysuria. Musculoskeletal: Negative for back pain. Skin: Negative for rash. Neurological: Negative for facial asymmetry and speech difficulty. All other systems reviewed and are negative. There were no vitals filed for this visit. Physical Exam 
Vitals signs and nursing note reviewed. Constitutional:   
   General: She is not in acute distress. Appearance: Normal appearance. She is well-developed. She is not ill-appearing, toxic-appearing or diaphoretic. Comments: NAD, AxOx2, speaking in complete sentences 
 
gcs = 14 (memory) Ambulating w/ assistance w/ ease; HENT:  
   Head: Normocephalic and atraumatic. Comments: Cn intact No facial droop/ slurred speech/ tongue deviation Right Ear: External ear normal.  
   Left Ear: External ear normal.  
Eyes:  
   General: No scleral icterus. Right eye: No discharge. Left eye: No discharge. Extraocular Movements: Extraocular movements intact. Conjunctiva/sclera: Conjunctivae normal.  
   Pupils: Pupils are equal, round, and reactive to light. Neck: Musculoskeletal: Normal range of motion and neck supple. No muscular tenderness. Vascular: No JVD. Trachea: No tracheal deviation. Cardiovascular:  
   Rate and Rhythm: Normal rate and regular rhythm. Pulses: Normal pulses. Heart sounds: Normal heart sounds. No murmur. No friction rub. No gallop. Pulmonary:  
   Effort: Pulmonary effort is normal. No respiratory distress. Breath sounds: Normal breath sounds. No wheezing or rales. Chest:  
   Chest wall: No tenderness. Abdominal:  
   General: Bowel sounds are normal.  
   Palpations: Abdomen is soft. Tenderness: There is no abdominal tenderness.  There is no guarding or rebound. Comments: nttp Genitourinary: 
   Vagina: No vaginal discharge. Comments: Pt denies urinary/ vaginal complaints Musculoskeletal: Normal range of motion. General: No swelling, tenderness, deformity or signs of injury. Right lower leg: No edema. Left lower leg: No edema. Lymphadenopathy:  
   Cervical: No cervical adenopathy. Skin: 
   General: Skin is warm and dry. Capillary Refill: Capillary refill takes less than 2 seconds. Coloration: Skin is not jaundiced or pale. Findings: No bruising, erythema, lesion or rash. Neurological:  
   General: No focal deficit present. Mental Status: She is alert. Mental status is at baseline. Cranial Nerves: No cranial nerve deficit. Sensory: No sensory deficit. Motor: No weakness or abnormal muscle tone. Coordination: Coordination normal.  
   Gait: Gait normal.  
   Deep Tendon Reflexes: Reflexes normal.  
Psychiatric:     
   Behavior: Behavior normal.     
   Thought Content: Thought content normal.  
 
  
 
MDM Procedures No chief complaint on file. 11:41 AM 
The patients presenting problems have been discussed, and they are in agreement with the care plan formulated and outlined with them. I have encouraged them to ask questions as they arise throughout their visit. MEDICATIONS GIVEN: 
Medications - No data to display LABS REVIEWED: 
Labs Reviewed - No data to display RADIOLOGY RESULTS: 
The following have been ordered and reviewed: 
_____________________________________________________________________ 
_____________________________________________________________________ EKG interpretation:  
Rhythm: normal sinus rhythm; and regular . Rate (approx.): 80; Axis: normal; P wave: normal; QRS interval: normal ; ST/T wave: normal; Negative acute significant segmental elevations/ unchanged compared to study dated 06/01/2020 PROCEDURES: 
 
 
 
CONSULTATIONS:  
 
 
 PROGRESS NOTES: 
 
 
DIAGNOSIS: 
 
1. Urinary tract infection without hematuria, site unspecified PLAN: 
1- UTI - Roce/ macrobid ED COURSE: The patients hospital course has been uncomplicated. 2:47 PM 
915 Marissa Lennon's  results have been reviewed with her. She has been counseled regarding her diagnosis. She verbally conveys understanding and agreement of the signs, symptoms, diagnosis, treatment and prognosis and additionally agrees to Call/ Arrange follow up as recommended with Dr. Sujatha Benavides MD in 24 - 48 hours. She also agrees with the care-plan and conveys that all of her questions have been answered. I have also put together some discharge instructions for her that include: 1) educational information regarding their diagnosis, 2) how to care for their diagnosis at home, as well a 3) list of reasons why they would want to return to the ED prior to their follow-up appointment, should their condition change or for concerns.

## 2020-06-25 NOTE — ED NOTES
Pt's daughter Cristin Cortes called to update her on pt's status and that [facility]

## 2020-06-25 NOTE — ED TRIAGE NOTES
EMS states that they were called for the pt not eating breakfast this morning. Pt states \"I wasn't hungry\" Pt states that she has no pain and doesn't know why she is here.

## 2020-06-25 NOTE — ED NOTES
Eddie from case management called to arrange a ride back to the [facility] for the pt.

## 2020-06-25 NOTE — DISCHARGE INSTRUCTIONS

## 2020-06-26 NOTE — PROGRESS NOTES
6/25/2020:  Decreased Appetitie/UTI w/o hematuria Patient on Cov19 D/C [facility] Enrollment Report/fu Contact. Left message on voice mail with my contact information for return call. Need to assess for d/c needs post ED or Inpatient Admission. And/or [facility] enrollment/fu.

## 2020-12-09 DIAGNOSIS — E78.5 HYPERLIPIDEMIA LDL GOAL <100: ICD-10-CM

## 2020-12-09 DIAGNOSIS — E11.22 TYPE 2 DIABETES MELLITUS WITH STAGE 3 CHRONIC KIDNEY DISEASE, WITH LONG-TERM CURRENT USE OF INSULIN (HCC): ICD-10-CM

## 2020-12-09 DIAGNOSIS — E27.40 ADRENAL INSUFFICIENCY (HCC): ICD-10-CM

## 2020-12-09 DIAGNOSIS — Z79.4 TYPE 2 DIABETES MELLITUS WITH STAGE 3 CHRONIC KIDNEY DISEASE, WITH LONG-TERM CURRENT USE OF INSULIN (HCC): ICD-10-CM

## 2020-12-09 DIAGNOSIS — N18.30 TYPE 2 DIABETES MELLITUS WITH STAGE 3 CHRONIC KIDNEY DISEASE, WITH LONG-TERM CURRENT USE OF INSULIN (HCC): ICD-10-CM

## 2020-12-09 DIAGNOSIS — E03.8 CENTRAL HYPOTHYROIDISM: ICD-10-CM

## 2020-12-22 ENCOUNTER — TELEPHONE (OUTPATIENT)
Dept: ENDOCRINOLOGY | Age: 80
End: 2020-12-22

## 2020-12-22 NOTE — TELEPHONE ENCOUNTER
----- Message from [employer] sent at 12/22/2020 10:50 AM EST -----  Regarding: Dr. Keith Cole first and last name: daughter       Reason for call: Pt passes away      Callback required yes/no and why:      Best contact number(s):      Details to clarify the request: Daughter wanted to let Dr. Dulce Maria Meza know pt passes away.        [employer]

## 2020-12-22 NOTE — TELEPHONE ENCOUNTER
Called and spoke with pt's daughter, Monica Mendez, who informed me that her mom fell in  and broke her hip and never recovered and passed away then and we were only notified today as she was due to see me for a f/u tomorrow. I offered her our condolences and prayers. Tiffanie,    Please gerald her as . Thanks.

## 2021-07-10 NOTE — PATIENT INSTRUCTIONS
Keep mentally engaged as possible. Continue your current medications. Have an MRI of your brain.
No Contractions

## 2021-10-20 NOTE — PATIENT INSTRUCTIONS
Drink plenty of fluids. Take Levaquin once daily for 5 days. Don't take any ondansetron while taking Levaquin. Return next week if symptoms aren't improving. Patient resting in bed with eyes closed

## 2022-05-16 NOTE — ED NOTES
A (Guidewire Prowater 20cm 180cm 3cm Strgt .014in) guidewire was removed.  Patient given pillow and positioned for comfort.